# Patient Record
Sex: FEMALE | Race: WHITE | NOT HISPANIC OR LATINO | Employment: OTHER | URBAN - METROPOLITAN AREA
[De-identification: names, ages, dates, MRNs, and addresses within clinical notes are randomized per-mention and may not be internally consistent; named-entity substitution may affect disease eponyms.]

---

## 2018-11-09 ENCOUNTER — APPOINTMENT (EMERGENCY)
Dept: RADIOLOGY | Facility: HOSPITAL | Age: 83
End: 2018-11-09
Payer: MEDICARE

## 2018-11-09 ENCOUNTER — HOSPITAL ENCOUNTER (OUTPATIENT)
Facility: HOSPITAL | Age: 83
Setting detail: OBSERVATION
Discharge: HOME | End: 2018-11-10
Attending: EMERGENCY MEDICINE | Admitting: INTERNAL MEDICINE
Payer: MEDICARE

## 2018-11-09 DIAGNOSIS — R07.9 CHEST PAIN: Primary | ICD-10-CM

## 2018-11-09 DIAGNOSIS — R07.1 CHEST PAIN ON BREATHING: ICD-10-CM

## 2018-11-09 PROBLEM — G30.9 ALZHEIMER'S DEMENTIA WITHOUT BEHAVIORAL DISTURBANCE (HCC): Status: ACTIVE | Noted: 2018-11-09

## 2018-11-09 PROBLEM — E11.9 TYPE 2 DIABETES MELLITUS WITHOUT COMPLICATION, WITHOUT LONG-TERM CURRENT USE OF INSULIN (HCC): Status: ACTIVE | Noted: 2018-11-09

## 2018-11-09 PROBLEM — E78.2 MIXED HYPERLIPIDEMIA: Status: ACTIVE | Noted: 2018-11-09

## 2018-11-09 PROBLEM — F02.80 ALZHEIMER'S DEMENTIA WITHOUT BEHAVIORAL DISTURBANCE (HCC): Status: ACTIVE | Noted: 2018-11-09

## 2018-11-09 LAB
ALBUMIN SERPL BCP-MCNC: 3.1 G/DL (ref 3.5–5)
ALP SERPL-CCNC: 94 U/L (ref 46–116)
ALT SERPL W P-5'-P-CCNC: 34 U/L (ref 12–78)
ANION GAP SERPL CALCULATED.3IONS-SCNC: 7 MMOL/L (ref 4–13)
AST SERPL W P-5'-P-CCNC: 27 U/L (ref 5–45)
BASOPHILS # BLD AUTO: 0.02 THOUSANDS/ΜL (ref 0–0.1)
BASOPHILS NFR BLD AUTO: 0 % (ref 0–1)
BILIRUB SERPL-MCNC: 0.27 MG/DL (ref 0.2–1)
BUN SERPL-MCNC: 11 MG/DL (ref 5–25)
CALCIUM SERPL-MCNC: 8.3 MG/DL (ref 8.3–10.1)
CHLORIDE SERPL-SCNC: 103 MMOL/L (ref 100–108)
CO2 SERPL-SCNC: 27 MMOL/L (ref 21–32)
CREAT SERPL-MCNC: 0.78 MG/DL (ref 0.6–1.3)
DEPRECATED D DIMER PPP: 1070 NG/ML (FEU) (ref 0–424)
EOSINOPHIL # BLD AUTO: 0.32 THOUSAND/ΜL (ref 0–0.61)
EOSINOPHIL NFR BLD AUTO: 6 % (ref 0–6)
ERYTHROCYTE [DISTWIDTH] IN BLOOD BY AUTOMATED COUNT: 13.1 % (ref 11.6–15.1)
GFR SERPL CREATININE-BSD FRML MDRD: 71 ML/MIN/1.73SQ M
GLUCOSE SERPL-MCNC: 180 MG/DL (ref 65–140)
HCT VFR BLD AUTO: 37.1 % (ref 34.8–46.1)
HGB BLD-MCNC: 12.1 G/DL (ref 11.5–15.4)
IMM GRANULOCYTES # BLD AUTO: 0.03 THOUSAND/UL (ref 0–0.2)
IMM GRANULOCYTES NFR BLD AUTO: 1 % (ref 0–2)
LYMPHOCYTES # BLD AUTO: 1.48 THOUSANDS/ΜL (ref 0.6–4.47)
LYMPHOCYTES NFR BLD AUTO: 27 % (ref 14–44)
MCH RBC QN AUTO: 32.5 PG (ref 26.8–34.3)
MCHC RBC AUTO-ENTMCNC: 32.6 G/DL (ref 31.4–37.4)
MCV RBC AUTO: 100 FL (ref 82–98)
MONOCYTES # BLD AUTO: 0.77 THOUSAND/ΜL (ref 0.17–1.22)
MONOCYTES NFR BLD AUTO: 14 % (ref 4–12)
NEUTROPHILS # BLD AUTO: 2.86 THOUSANDS/ΜL (ref 1.85–7.62)
NEUTS SEG NFR BLD AUTO: 52 % (ref 43–75)
NRBC BLD AUTO-RTO: 0 /100 WBCS
PLATELET # BLD AUTO: 219 THOUSANDS/UL (ref 149–390)
PLATELET # BLD AUTO: 223 THOUSANDS/UL (ref 149–390)
PMV BLD AUTO: 11.4 FL (ref 8.9–12.7)
PMV BLD AUTO: 11.4 FL (ref 8.9–12.7)
POTASSIUM SERPL-SCNC: 4 MMOL/L (ref 3.5–5.3)
PROT SERPL-MCNC: 6.5 G/DL (ref 6.4–8.2)
RBC # BLD AUTO: 3.72 MILLION/UL (ref 3.81–5.12)
SODIUM SERPL-SCNC: 137 MMOL/L (ref 136–145)
TROPONIN I SERPL-MCNC: <0.02 NG/ML
WBC # BLD AUTO: 5.48 THOUSAND/UL (ref 4.31–10.16)

## 2018-11-09 PROCEDURE — 71046 X-RAY EXAM CHEST 2 VIEWS: CPT

## 2018-11-09 PROCEDURE — 85379 FIBRIN DEGRADATION QUANT: CPT | Performed by: EMERGENCY MEDICINE

## 2018-11-09 PROCEDURE — 96374 THER/PROPH/DIAG INJ IV PUSH: CPT

## 2018-11-09 PROCEDURE — 99285 EMERGENCY DEPT VISIT HI MDM: CPT

## 2018-11-09 PROCEDURE — 84484 ASSAY OF TROPONIN QUANT: CPT | Performed by: EMERGENCY MEDICINE

## 2018-11-09 PROCEDURE — 93005 ELECTROCARDIOGRAM TRACING: CPT

## 2018-11-09 PROCEDURE — 85025 COMPLETE CBC W/AUTO DIFF WBC: CPT | Performed by: EMERGENCY MEDICINE

## 2018-11-09 PROCEDURE — 71275 CT ANGIOGRAPHY CHEST: CPT

## 2018-11-09 PROCEDURE — 36415 COLL VENOUS BLD VENIPUNCTURE: CPT | Performed by: EMERGENCY MEDICINE

## 2018-11-09 PROCEDURE — 99220 PR INITIAL OBSERVATION CARE/DAY 70 MINUTES: CPT | Performed by: INTERNAL MEDICINE

## 2018-11-09 PROCEDURE — 1124F ACP DISCUSS-NO DSCNMKR DOCD: CPT | Performed by: INTERNAL MEDICINE

## 2018-11-09 PROCEDURE — 85049 AUTOMATED PLATELET COUNT: CPT | Performed by: INTERNAL MEDICINE

## 2018-11-09 PROCEDURE — 80053 COMPREHEN METABOLIC PANEL: CPT | Performed by: EMERGENCY MEDICINE

## 2018-11-09 RX ORDER — ONDANSETRON 2 MG/ML
4 INJECTION INTRAMUSCULAR; INTRAVENOUS EVERY 6 HOURS PRN
Status: DISCONTINUED | OUTPATIENT
Start: 2018-11-09 | End: 2018-11-10 | Stop reason: HOSPADM

## 2018-11-09 RX ORDER — 0.9 % SODIUM CHLORIDE 0.9 %
3 VIAL (ML) INJECTION AS NEEDED
Status: DISCONTINUED | OUTPATIENT
Start: 2018-11-09 | End: 2018-11-10 | Stop reason: HOSPADM

## 2018-11-09 RX ORDER — KETOROLAC TROMETHAMINE 30 MG/ML
15 INJECTION, SOLUTION INTRAMUSCULAR; INTRAVENOUS ONCE
Status: COMPLETED | OUTPATIENT
Start: 2018-11-09 | End: 2018-11-09

## 2018-11-09 RX ADMIN — IOHEXOL 85 ML: 350 INJECTION, SOLUTION INTRAVENOUS at 16:46

## 2018-11-09 RX ADMIN — KETOROLAC TROMETHAMINE: 30 INJECTION, SOLUTION INTRAMUSCULAR at 15:56

## 2018-11-09 NOTE — ED PROVIDER NOTES
History  Chief Complaint   Patient presents with    Chest Pain     Pt with dementia, here for chest pain, was given 324mg po ASA and 2 sl nitro en route  Patient is an 17-year-old female with a history of hyperlipidemia and diabetes that presents with chest pain  Patient is a poor historian as she has a history of dementia  Patient says that she has been dealing with intermittent chest pain on the left chest over the past two days through she says that the pain worsened acutely at 11:00 a m  this morning  Patient tells me that the pain has been waxing and waning though it never completely goes away since yesterday  She describes the pain as nonradiating, nonexertional   She does tell me that the pain is pleuritic in nature  The pain is also worsened with movement of the left shoulder  She denies any associated nausea, vomiting, diaphoresis, dyspnea  She says that at its worst the pain is a 5/10 but it is basal level is 3/10  The she denies viral upper respiratory symptoms including cough, congestion, rhinorrhea, fevers, chills  She has had no known sick contacts  The pain does not radiate into her back and it is not tearing coffee  Specifically, patient describes the pain as a poking  She has not taken anything for the pain  ACS risk factors include hyperlipidemia and diabetes though she has no known cardiac history  She denies specific DVT/PE risk factors including recent surgery, recent immobilization, hormone use, prior history DVT  None       Past Medical History:   Diagnosis Date    Alzheimer's dementia     HLD (hyperlipidemia)     T2DM (type 2 diabetes mellitus) ([facility] [address] )        History reviewed  No pertinent surgical history  Family History   Problem Relation Age of Onset    No Known Problems Mother     No Known Problems Father      I have reviewed and agree with the history as documented      Social History   Substance Use Topics    Smoking status: Never Smoker    Smokeless tobacco: Not on file    Alcohol use No        Review of Systems   Constitutional: Negative for chills, diaphoresis, fatigue and fever  HENT: Negative for facial swelling, sore throat and trouble swallowing  Respiratory: Negative for cough, chest tightness, shortness of breath and wheezing  Cardiovascular: Positive for chest pain  Negative for palpitations and leg swelling  Gastrointestinal: Negative for abdominal distention, abdominal pain, diarrhea, nausea and vomiting  Genitourinary: Negative for dysuria  Musculoskeletal: Negative for back pain, neck pain and neck stiffness  Skin: Negative for color change, pallor, rash and wound  Neurological: Negative for weakness, light-headedness and numbness  Psychiatric/Behavioral: Negative for agitation  All other systems reviewed and are negative  Physical Exam  ED Triage Vitals [11/09/18 1508]   Temperature Pulse Respirations Blood Pressure SpO2   (!) 97 3 °F (36 3 °C) 79 16 140/91 98 %      Temp Source Heart Rate Source Patient Position - Orthostatic VS BP Location FiO2 (%)   Tympanic Monitor Sitting Right arm --      Pain Score       No Pain           Orthostatic Vital Signs  Vitals:    11/09/18 2018 11/09/18 2026 11/09/18 2300 11/10/18 0730   BP: (!) 173/75 130/70 135/72 165/82   Pulse: 69  77 83   Patient Position - Orthostatic VS: Lying Lying Lying Lying       Physical Exam   Constitutional: She is oriented to person, place, and time  She appears well-developed and well-nourished  No distress  HENT:   Head: Normocephalic  Eyes: Pupils are equal, round, and reactive to light  Neck: Normal range of motion  Neck supple  Cardiovascular: Normal rate, regular rhythm, normal heart sounds and intact distal pulses  Pulmonary/Chest: Effort normal and breath sounds normal    Lungs are clear bilaterally   Abdominal: Soft  Bowel sounds are normal  She exhibits no distension  There is no tenderness  There is no guarding     Abdomen is soft, nondistended, nontender  No rebound tenderness or guarding  No masses palpated  Musculoskeletal: Normal range of motion  She exhibits tenderness  She exhibits no edema or deformity  Chest pain is reproducible with palpation of the left chest   Chest pain is also reproducible with passive motion of the left shoulder  Neurological: She is alert and oriented to person, place, and time  No cranial nerve deficit or sensory deficit  Skin: Skin is warm and dry  Capillary refill takes less than 2 seconds  Psychiatric: She has a normal mood and affect  Her behavior is normal  Judgment and thought content normal    Vitals reviewed        ED Medications  Medications   ketorolac (TORADOL) injection 15 mg ( Intravenous Given 11/9/18 1556)   iohexol (OMNIPAQUE) 350 MG/ML injection (MULTI-DOSE) 85 mL (85 mL Intravenous Given 11/9/18 1646)       Diagnostic Studies  Results Reviewed     Procedure Component Value Units Date/Time    CBC and differential [[id number]]  (Abnormal) Collected:  11/10/18    Lab Status:  Final result Specimen:  Blood Updated:  11/10/18 0840     WBC 5 57 Thousand/uL      RBC 3 48 (L) Million/uL      Hemoglobin 11 4 (L) g/dL      Hematocrit 34 4 (L) %      MCV 99 (H) fL      MCH 32 8 pg      MCHC 33 1 g/dL      RDW 13 2 %      MPV 12 0 fL      Platelets 793 Thousands/uL      nRBC 0 /100 WBCs      Neutrophils Relative 48 %      Immat GRANS % 1 %      Lymphocytes Relative 27 %      Monocytes Relative 16 (H) %      Eosinophils Relative 8 (H) %      Basophils Relative 0 %      Neutrophils Absolute 2 66 Thousands/µL      Immature Grans Absolute 0 03 Thousand/uL      Lymphocytes Absolute 1 52 Thousands/µL      Monocytes Absolute 0 90 Thousand/µL      Eosinophils Absolute 0 44 Thousand/µL      Basophils Absolute 0 02 Thousands/µL     Hemoglobin A1C [[id number]] Collected:  11/10/18    Lab Status:  Final result Specimen:  Blood Updated:  11/10/18 0641     Hemoglobin A1C 6 2 %       mg/dl Troponin I [[id number]]  (Normal) Collected:  11/10/18 0432    Lab Status:  Final result Specimen:  Blood from Arm, Right Updated:  11/10/18 0639     Troponin I <0 02 ng/mL     D-dimer, quantitative [[id number]]  (Abnormal) Collected:  11/09/18 1548    Lab Status:  Final result Specimen:  Blood from Arm, Left Updated:  11/09/18 1623     D-Dimer, Quant 1,070 (H) ng/ml (FEU)     Comprehensive metabolic panel [[id number]]  (Abnormal) Collected:  11/09/18 1548    Lab Status:  Final result Specimen:  Blood from Arm, Left Updated:  11/09/18 1623     Sodium 137 mmol/L      Potassium 4 0 mmol/L      Chloride 103 mmol/L      CO2 27 mmol/L      ANION GAP 7 mmol/L      BUN 11 mg/dL      Creatinine 0 78 mg/dL      Glucose 180 (H) mg/dL      Calcium 8 3 mg/dL      AST 27 U/L      ALT 34 U/L      Alkaline Phosphatase 94 U/L      Total Protein 6 5 g/dL      Albumin 3 1 (L) g/dL      Total Bilirubin 0 27 mg/dL      eGFR 71 ml/min/1 73sq m     Narrative:         National Kidney Disease Education Program recommendations are as follows:  GFR calculation is accurate only with a steady state creatinine  Chronic Kidney disease less than 60 ml/min/1 73 sq  meters  Kidney failure less than 15 ml/min/1 73 sq  meters      Troponin I [[id number]]  (Normal) Collected:  11/09/18 1548    Lab Status:  Final result Specimen:  Blood from Arm, Left Updated:  11/09/18 1617     Troponin I <0 02 ng/mL     CBC and differential [[id number]]  (Abnormal) Collected:  11/09/18 1548    Lab Status:  Final result Specimen:  Blood from Arm, Left Updated:  11/09/18 1603     WBC 5 48 Thousand/uL      RBC 3 72 (L) Million/uL      Hemoglobin 12 1 g/dL      Hematocrit 37 1 %       (H) fL      MCH 32 5 pg      MCHC 32 6 g/dL      RDW 13 1 %      MPV 11 4 fL      Platelets 765 Thousands/uL      nRBC 0 /100 WBCs      Neutrophils Relative 52 %      Immat GRANS % 1 %      Lymphocytes Relative 27 %      Monocytes Relative 14 (H) %      Eosinophils Relative 6 % Basophils Relative 0 %      Neutrophils Absolute 2 86 Thousands/µL      Immature Grans Absolute 0 03 Thousand/uL      Lymphocytes Absolute 1 48 Thousands/µL      Monocytes Absolute 0 77 Thousand/µL      Eosinophils Absolute 0 32 Thousand/µL      Basophils Absolute 0 02 Thousands/µL                  X-ray chest 2 views   Final Result by Fernanda Davis DO (11/09 1712)      No acute cardiopulmonary disease  Workstation performed: [id number]         CTA ED chest PE study   Final Result by Hay Lopes MD (11/09 1702)      No pulmonary arterial embolism  No pulmonary consolidation  Mosaic density of the pulmonary parenchyma in keeping with a chronic small vessel or small airway disease  Cardiomegaly        Workstation performed: [id number]               Procedures  ECG 12 Lead Documentation  Date/Time: 11/12/2018 9:16 AM  Performed by: Tessa Carrillo by: Gena Garcia     ECG reviewed by me, the ED Provider: yes    Patient location:  ED  Previous ECG:     Previous ECG:  Compared to current    Similarity:  No change    Comparison to cardiac monitor: Yes    Interpretation:     Interpretation: normal    Rate:     ECG rate assessment: normal    Rhythm:     Rhythm: sinus rhythm    Ectopy:     Ectopy: none    Conduction:     Conduction: normal    ST segments:     ST segments:  Normal  T waves:     T waves: normal            Phone Consults  ED Phone Contact    ED Course         HEART Risk Score      Most Recent Value   History  1 Filed at: 11/09/2018 1618   ECG  0 Filed at: 11/09/2018 1618   Age  2 Filed at: 11/09/2018 1618   Risk Factors  1 Filed at: 11/09/2018 1618   Troponin  0 Filed at: 11/09/2018 1618   Heart Score Risk Calculator   History  1 Filed at: 11/09/2018 1618   ECG  0 Filed at: 11/09/2018 1618   Age  2 Filed at: 11/09/2018 1618   Risk Factors  1 Filed at: 11/09/2018 1618   Troponin  0 Filed at: 11/09/2018 1618   HEART Score  4 Filed at: 11/09/2018 1618   HEART Score  4 Filed at: 11/09/2018 1618                            [facility]  Number of Diagnoses or Management Options  Chest pain: new and requires workup  Diagnosis management comments: Patient is an 68-year-old female with history of hyperlipidemia and diabetes who presents with chest pain  Due to the pleuritic nature of the pain, get a screening D-dimer on the patient which was positive  We then continued to get a CTA PE study which was negative  Otherwise the initial workup was negative including EKG, troponin  Patient will be admitted to [facility] for chest pain rule  Heart score 4  Patient was hemodynamically stable throughout her time here in the emergency department  Amount and/or Complexity of Data Reviewed  Clinical lab tests: ordered and reviewed  Tests in the radiology section of CPT®: ordered and reviewed    Risk of Complications, Morbidity, and/or Mortality  Presenting problems: low  Diagnostic procedures: low  Management options: low    Patient Progress  Patient progress: stable    CritCare Time    Disposition  Final diagnoses:   Chest pain     Time reflects when diagnosis was documented in both MDM as applicable and the Disposition within this note     Time User Action Codes Description Comment    11/9/2018  5:20 PM Harjinder Ripper [R07 9] Chest pain     11/10/2018 10:49 AM Amaris Mast [R07 1] Chest pain on breathing       ED Disposition     ED Disposition Condition Comment    Admit  Case was discussed with [facility] and the patient's admission status was agreed to be Admission Status: observation status to the service of Dr Irene Stoddard           Follow-up Information     Follow up With Specialties Details Why Contact Info    Debi Del Valle MD [facility] Medicine Schedule an appointment as soon as possible for a visit in 1 week(s)  [address] [place] [address] [id number] [phone]            Discharge Medication List as of 11/10/2018 11:00 AM      START taking these medications Details   ibuprofen (MOTRIN) 400 mg tablet Take 1 tablet (400 mg total) by mouth every 8 (eight) hours as needed for mild pain, Starting Sat 11/10/2018, No Print             Outpatient Discharge Orders  Discharge Diet     Discharge Diet     Activity as tolerated     Call provider for:  severe uncontrolled pain         ED Provider  Attending physically available and evaluated Lora Dumont I managed the patient along with the ED Attending      Electronically Signed by         Shu Cervantes MD  11/12/18 [id number]

## 2018-11-09 NOTE — ED ATTENDING ATTESTATION
Kalie Kramer MD, saw and evaluated the patient  I have discussed the patient with the resident/non-physician practitioner and agree with the resident's/non-physician practitioner's findings, Plan of Care, and MDM as documented in the resident's/non-physician practitioner's note, except where noted  All available labs and Radiology studies were reviewed  At this point I agree with the current assessment done in the Emergency Department  I have conducted an independent evaluation of this patient a history and physical is as follows:  29-year-old female presenting with chest pain  Patient has dimension is a poor historian  Patient complained of burning chest pain and indigestion yesterday at her facility  Today, she complains of a poking chest pain in her left chest that is made worse with breathing  The patient states that it is intermittent, and ranges from a 3/10 to S 5/10  She has not had fevers or cough  She does not have PE risk factors  The pain was not maximal at onset  It does not radiate to her back is not tearing in quality  The pain is also made worse by movement  Nothing in particular makes it better, and is not affected by food  The patient has a history of diabetes and hyperlipidemia  She has no coronary history  Her review of systems otherwise negative in 12 systems were reviewed  On exam Vital signs were reviewed  Patient is awake, alert, interactive  The patient's pupils are equally round reactive to light  Oropharynx is clear with moist mucous membranes  Neck is supple and nontender with no adenopathy or JVD  Heart is regular with no murmurs, rubs, or gallops  Lungs are clear and equal with no wheezes, rales, or rhonchi  Abdomen is soft and nontender with no masses, rebound, or guarding  There is no CVA tenderness  The patient was completely exposed  There is no skin breakdown  There are no rashes or skin changes    Extremities are warm and well perfused with good pulses  The patient has normal strength, sensation, and cranial nerves  EKG reviewed by me, no evidence of ischemia or ectopy  Impression:  Chest pain  Patient is low risk for PE, therefore will plan to check D-dimer  Patient has heart score of 5, so will admit for observation for chest pain evaluation      Critical Care Time  CritCare Time    Procedures

## 2018-11-10 VITALS
HEIGHT: 61 IN | SYSTOLIC BLOOD PRESSURE: 165 MMHG | WEIGHT: 138 LBS | DIASTOLIC BLOOD PRESSURE: 82 MMHG | OXYGEN SATURATION: 97 % | BODY MASS INDEX: 26.06 KG/M2 | TEMPERATURE: 98 F | RESPIRATION RATE: 16 BRPM | HEART RATE: 83 BPM

## 2018-11-10 LAB
ALBUMIN SERPL BCP-MCNC: 2.7 G/DL (ref 3.5–5)
ALP SERPL-CCNC: 75 U/L (ref 46–116)
ALT SERPL W P-5'-P-CCNC: 31 U/L (ref 12–78)
ANION GAP SERPL CALCULATED.3IONS-SCNC: 3 MMOL/L (ref 4–13)
AST SERPL W P-5'-P-CCNC: 23 U/L (ref 5–45)
ATRIAL RATE: 76 BPM
BASOPHILS # BLD AUTO: 0.02 THOUSANDS/ΜL (ref 0–0.1)
BASOPHILS NFR BLD AUTO: 0 % (ref 0–1)
BILIRUB SERPL-MCNC: 0.24 MG/DL (ref 0.2–1)
BUN SERPL-MCNC: 15 MG/DL (ref 5–25)
CALCIUM SERPL-MCNC: 8.3 MG/DL (ref 8.3–10.1)
CHLORIDE SERPL-SCNC: 105 MMOL/L (ref 100–108)
CHOLEST SERPL-MCNC: 146 MG/DL (ref 50–200)
CO2 SERPL-SCNC: 29 MMOL/L (ref 21–32)
CREAT SERPL-MCNC: 0.71 MG/DL (ref 0.6–1.3)
EOSINOPHIL # BLD AUTO: 0.44 THOUSAND/ΜL (ref 0–0.61)
EOSINOPHIL NFR BLD AUTO: 8 % (ref 0–6)
ERYTHROCYTE [DISTWIDTH] IN BLOOD BY AUTOMATED COUNT: 13.2 % (ref 11.6–15.1)
ERYTHROCYTE [DISTWIDTH] IN BLOOD BY AUTOMATED COUNT: 13.2 % (ref 11.6–15.1)
EST. AVERAGE GLUCOSE BLD GHB EST-MCNC: 131 MG/DL
GFR SERPL CREATININE-BSD FRML MDRD: 79 ML/MIN/1.73SQ M
GLUCOSE SERPL-MCNC: 100 MG/DL (ref 65–140)
GLUCOSE SERPL-MCNC: 128 MG/DL (ref 65–140)
HBA1C MFR BLD: 6.2 % (ref 4.2–6.3)
HCT VFR BLD AUTO: 34.4 % (ref 34.8–46.1)
HCT VFR BLD AUTO: 35.2 % (ref 34.8–46.1)
HDLC SERPL-MCNC: 52 MG/DL (ref 40–60)
HGB BLD-MCNC: 11.3 G/DL (ref 11.5–15.4)
HGB BLD-MCNC: 11.4 G/DL (ref 11.5–15.4)
IMM GRANULOCYTES # BLD AUTO: 0.03 THOUSAND/UL (ref 0–0.2)
IMM GRANULOCYTES NFR BLD AUTO: 1 % (ref 0–2)
LDLC SERPL CALC-MCNC: 72 MG/DL (ref 0–100)
LYMPHOCYTES # BLD AUTO: 1.52 THOUSANDS/ΜL (ref 0.6–4.47)
LYMPHOCYTES NFR BLD AUTO: 27 % (ref 14–44)
MCH RBC QN AUTO: 32.1 PG (ref 26.8–34.3)
MCH RBC QN AUTO: 32.8 PG (ref 26.8–34.3)
MCHC RBC AUTO-ENTMCNC: 32.1 G/DL (ref 31.4–37.4)
MCHC RBC AUTO-ENTMCNC: 33.1 G/DL (ref 31.4–37.4)
MCV RBC AUTO: 100 FL (ref 82–98)
MCV RBC AUTO: 99 FL (ref 82–98)
MONOCYTES # BLD AUTO: 0.9 THOUSAND/ΜL (ref 0.17–1.22)
MONOCYTES NFR BLD AUTO: 16 % (ref 4–12)
NEUTROPHILS # BLD AUTO: 2.66 THOUSANDS/ΜL (ref 1.85–7.62)
NEUTS SEG NFR BLD AUTO: 48 % (ref 43–75)
NONHDLC SERPL-MCNC: 94 MG/DL
NRBC BLD AUTO-RTO: 0 /100 WBCS
P AXIS: 32 DEGREES
PLATELET # BLD AUTO: 202 THOUSANDS/UL (ref 149–390)
PLATELET # BLD AUTO: 202 THOUSANDS/UL (ref 149–390)
PMV BLD AUTO: 11.6 FL (ref 8.9–12.7)
PMV BLD AUTO: 12 FL (ref 8.9–12.7)
POTASSIUM SERPL-SCNC: 4 MMOL/L (ref 3.5–5.3)
PR INTERVAL: 156 MS
PROT SERPL-MCNC: 5.9 G/DL (ref 6.4–8.2)
QRS AXIS: -38 DEGREES
QRSD INTERVAL: 102 MS
QT INTERVAL: 438 MS
QTC INTERVAL: 492 MS
RBC # BLD AUTO: 3.48 MILLION/UL (ref 3.81–5.12)
RBC # BLD AUTO: 3.52 MILLION/UL (ref 3.81–5.12)
SODIUM SERPL-SCNC: 137 MMOL/L (ref 136–145)
T WAVE AXIS: 62 DEGREES
T4 FREE SERPL-MCNC: 0.99 NG/DL (ref 0.76–1.46)
TRIGL SERPL-MCNC: 109 MG/DL
TROPONIN I SERPL-MCNC: <0.02 NG/ML
TROPONIN I SERPL-MCNC: <0.02 NG/ML
TSH SERPL DL<=0.05 MIU/L-ACNC: 4.35 UIU/ML (ref 0.36–3.74)
VENTRICULAR RATE: 76 BPM
WBC # BLD AUTO: 5.16 THOUSAND/UL (ref 4.31–10.16)
WBC # BLD AUTO: 5.57 THOUSAND/UL (ref 4.31–10.16)

## 2018-11-10 PROCEDURE — 84484 ASSAY OF TROPONIN QUANT: CPT | Performed by: NURSE PRACTITIONER

## 2018-11-10 PROCEDURE — 80053 COMPREHEN METABOLIC PANEL: CPT | Performed by: INTERNAL MEDICINE

## 2018-11-10 PROCEDURE — 93010 ELECTROCARDIOGRAM REPORT: CPT | Performed by: INTERNAL MEDICINE

## 2018-11-10 PROCEDURE — 84439 ASSAY OF FREE THYROXINE: CPT | Performed by: NURSE PRACTITIONER

## 2018-11-10 PROCEDURE — 99217 PR OBSERVATION CARE DISCHARGE MANAGEMENT: CPT | Performed by: NURSE PRACTITIONER

## 2018-11-10 PROCEDURE — 82948 REAGENT STRIP/BLOOD GLUCOSE: CPT

## 2018-11-10 PROCEDURE — 84484 ASSAY OF TROPONIN QUANT: CPT | Performed by: INTERNAL MEDICINE

## 2018-11-10 PROCEDURE — 84443 ASSAY THYROID STIM HORMONE: CPT | Performed by: INTERNAL MEDICINE

## 2018-11-10 PROCEDURE — 80061 LIPID PANEL: CPT | Performed by: INTERNAL MEDICINE

## 2018-11-10 PROCEDURE — 83036 HEMOGLOBIN GLYCOSYLATED A1C: CPT | Performed by: INTERNAL MEDICINE

## 2018-11-10 PROCEDURE — 85025 COMPLETE CBC W/AUTO DIFF WBC: CPT | Performed by: INTERNAL MEDICINE

## 2018-11-10 RX ORDER — IBUPROFEN 400 MG/1
400 TABLET ORAL EVERY 8 HOURS PRN
Refills: 0
Start: 2018-11-10 | End: 2020-04-16 | Stop reason: HOSPADM

## 2018-11-10 RX ADMIN — ENOXAPARIN SODIUM 40 MG: 40 INJECTION SUBCUTANEOUS at 08:47

## 2018-11-10 NOTE — RESPIRATORY THERAPY NOTE
RT Protocol Note  Nimesh Gautam 80 y o  female MRN: [id number]  Unit/Bed#: CW2 217-01 Encounter: [id number]    Assessment    Active Problems:    Chest pain on breathing    Type 2 diabetes mellitus without complication, without long-term current use of insulin (HCC)    Mixed hyperlipidemia    Alzheimer's dementia without behavioral disturbance      Home Pulmonary Medications:  None       Past Medical History:   Diagnosis Date    Alzheimer's dementia     HLD (hyperlipidemia)     T2DM (type 2 diabetes mellitus) ([facility] [address] )      Social History     Social History    Marital status:      Spouse name: N/A    Number of children: N/A    Years of education: N/A     Social History Main Topics    Smoking status: Never Smoker    Smokeless tobacco: None    Alcohol use No    Drug use: Unknown    Sexual activity: Not Asked     Other Topics Concern    None     Social History Narrative    None       Subjective         Objective    Physical Exam:   Assessment Type: (P) Assess only  General Appearance: (P) Alert, Awake  Respiratory Pattern: (P) Normal  Chest Assessment: (P) Chest expansion symmetrical  Bilateral Breath Sounds: (P) Clear  Cough: (P) None  O2 Device: (P) RA    Vitals:  Blood pressure 130/70, pulse 69, temperature 98 3 °F (36 8 °C), temperature source Oral, resp  rate 16, height 5' 1" (1 549 m), weight 62 6 kg (138 lb), SpO2 97 %  Imaging and other studies: I have personally reviewed pertinent reports  O2 Device: (P) RA     Plan    Respiratory Plan: (P) No distress/Pulmonary history, Discontinue Protocol        Resp Comments: (P) Patient was assessed for respiratory protocol  Patient is admitted with CP (c/o pain on left)  Patient denies having any pulmonary history and takes no respiratory medications at home  Patient appears comfortable in no distress with clear breath sounds upon exam   CXR was read as "clear"  Bronchodilators are not needed at this time    Respiratory protocol will be D/C'd at this time

## 2018-11-10 NOTE — ASSESSMENT & PLAN NOTE
Probably musculoskeletal chest pain  R/o ACS given age and risk factors  Serial EKG's and troponins - if negative, stress test as outpt via PCP

## 2018-11-10 NOTE — UTILIZATION REVIEW
Initial Clinical Review    Admission: Date/Time/Statement: 11/09/18 @ 1722 -- OBS    Orders Placed This Encounter   Procedures    Place in Observation (expected length of stay for this patient is less than two midnights)     Standing Status:   Standing     Number of Occurrences:   1     Order Specific Question:   Admitting Physician     Answer:   Donneta Hatchet     Order Specific Question:   Level of Care     Answer:   Med Surg [16]       ED: Date/Time/Mode of Arrival:   ED Arrival Information     Expected Arrival Acuity Means of Arrival Escorted By Service Admission Type    - 11/9/2018 15:06 [facility] DEPARTMENT [facility] Hospitalist Emergency    Arrival Complaint    chest pain          Chief Complaint:   Chief Complaint   Patient presents with    Chest Pain     Pt with dementia, here for chest pain, was given 324mg po ASA and 2 sl nitro en route  History of Illness:   80 y o  female who presents with known Hx of dementia, T2DM and HLD, presented with atypical chest pain manifest as burning sensation lower chest yesterday and some pain on deep breathing on left side today  Was sent to ER for evaluation from [address] per son who provided Hx  ED Vital Signs:   ED Triage Vitals [11/09/18 1508]   Temperature Pulse Respirations Blood Pressure SpO2   (!) 97 3 °F (36 3 °C) 79 16 140/91 98 %      Temp Source Heart Rate Source Patient Position - Orthostatic VS BP Location FiO2 (%)   Tympanic Monitor Sitting Right arm --      Pain Score       No Pain        Wt Readings from Last 1 Encounters:   11/09/18 62 6 kg (138 lb)       Vital Signs (abnormal): /75    Abnormal Labs/Diagnostic Test Results: Glucose 180, D-dimer 1070    CT chest -- No pulmonary arterial embolism  No pulmonary consolidation  Mosaic density of the pulmonary parenchyma in keeping with a chronic small vessel or small airway disease   Cardiomegaly    ED Treatment:   Medication Administration from 11/09/2018 1506 to 11/09/2018 2004       Date/Time Order Dose Route Action     11/09/2018 1556 ketorolac (TORADOL) injection 15 mg   Intravenous Given     11/09/2018 1646 iohexol (OMNIPAQUE) 350 MG/ML injection (MULTI-DOSE) 85 mL 85 mL Intravenous Given       Past Medical/Surgical History:   Past Medical History:   Diagnosis Date    Alzheimer's dementia     HLD (hyperlipidemia)     T2DM (type 2 diabetes mellitus) ([facility] [address] )        Admitting Diagnosis: Chest pain [R07 9]    Age/Sex: 80 y o  female    Assessment/Plan: 79 y/o presented to ED from [address] with c/o chest pain on breathing -- serial trops & ekg's    Admission Orders:  Scheduled Meds:   enoxaparin 40 mg Subcutaneous Daily   ondansetron 4 mg Intravenous Q6H PRN   sodium chloride (PF) 3 mL Intravenous PRN     Telem  Serial trops with EKGS  Reg diet  I&O's  SCD's

## 2018-11-10 NOTE — PLAN OF CARE
Problem: Potential for Falls  Goal: Patient will remain free of falls  INTERVENTIONS:  - Assess patient frequently for physical needs  -  Identify cognitive and physical deficits and behaviors that affect risk of falls    -  [place] fall precautions as indicated by assessment   - Educate patient/family on patient safety including physical limitations  - Instruct patient to call for assistance with activity based on assessment  - Modify environment to reduce risk of injury  - Consider OT/PT consult to assist with strengthening/mobility   Outcome: Progressing

## 2018-11-10 NOTE — DISCHARGE SUMMARY
Discharge Summary - [address] [facility]    Patient Information: Theresa Bolton 80 y o  female MRN: [id number]  Unit/Bed#: CW2 217-01 Encounter: [id number]    Discharging Physician / Practitioner: AWA Osborne  PCP: Bev Camejo MD  Admission Date: 11/9/2018  Discharge Date: 11/10/18    Reason for Admission: chest pain    Discharge Diagnoses: Active Problems:    Chest pain on breathing    Type 2 diabetes mellitus without complication, without long-term current use of insulin (HCC)    Mixed hyperlipidemia    Alzheimer's dementia without behavioral disturbance  Resolved Problems:    * No resolved hospital problems  *      Consultations During Hospital Stay:  · None     Procedures Performed:     · None     Significant Findings / Test Results:     · Chest x-ray negative  · CTA PE study negative PE  No pulmonary consolidation  Mosaic density of the pulmonary parenchyma in keeping with a chronic small vessel or small airway disease  Cardiomegaly  · Serial troponins x3 negative  · CMP/CBC unremarkable  · Lipid panel total cholesterol 146, triglycerides 109, HDL 52, LDL 72  · D-dimer 1070  · Hemoglobin A1c 6 2  · TSH 4 350/T4 0 99  · EKG unavailable to me however documented as unremarkable in the ER  Incidental Findings:   · Elevated TSH however normal T4  Test Results Pending at Discharge (will require follow up): · None     Outpatient Tests Requested:  · Follow-up with primary care provider within 1 week for post hospital visit  Consideration for outpatient stress test and blood pressure check  Complications:  None    Hospital Course:     Theresa Bolton is a 80 y o  female patient with past medical history of hyperlipidemia and diabetes which seems to be diet controlled who originally presented to the hospital on 11/9/2018 due to an episode of left-sided chest pain/rib pain worsening with deep inspiration  No other associated symptoms   She was found to have an elevated D-dimer therefore CTA to rule out PE was pursued  This was negative  She had negative serial troponins x3  Her EKG is unavailable to me currently however was reported negative by admitting physician  Her telemetry review has been unremarkable  She does continue to have some mild left-sided rib pain which does worsen with inspiration and is reproducible with palpation  This seems most consistent with musculoskeletal pain  Low suspicion for cardiac etiology  Patient is medically stable for discharge  I have instructed her to follow up with her primary care provider  Incidentally, her blood pressures were noted to be elevated intermittently  She is on no medications at home as an outpatient  Do not feel there is any indication to start antihypertensive management therapy at this time  Will defer to PCP  Message left for reshma Haile  Callback number provided  Condition at Discharge: stable     Discharge Day Visit / Exam:     Subjective:  Patient offers no complaints other than persistent left rib pain which does slightly worsened with deep inspiration  She states that it is overall improved  No associated symptoms such as shortness of breath, palpitations  Vitals: Blood Pressure: 165/82 (11/10/18 0730)  Pulse: 83 (11/10/18 0730)  Temperature: 98 °F (36 7 °C) (11/10/18 0730)  Temp Source: Oral (11/10/18 0730)  Respirations: 16 (11/10/18 0730)  Height: 5' 1" (154 9 cm) (11/09/18 2018)  Weight - Scale: 62 6 kg (138 lb) (11/09/18 2018)  SpO2: 97 % (11/10/18 0730)     Exam:   Physical Exam   Constitutional: She is oriented to person, place, and time  No distress  HENT:   Head: Normocephalic  Eyes: Pupils are equal, round, and reactive to light  Neck: Normal range of motion  Cardiovascular: Normal rate, regular rhythm and intact distal pulses  No murmur heard  Pulmonary/Chest: Effort normal and breath sounds normal    Abdominal: Soft   Bowel sounds are normal    Musculoskeletal: Normal range of motion  She exhibits edema  Neurological: She is alert and oriented to person, place, and time  Skin: Skin is warm and dry  Psychiatric: She has a normal mood and affect  Nursing note and vitals reviewed  Discussion with Family: Patient    Discharge instructions/Information to patient and family:   See after visit summary for information provided to patient and family  Provisions for Follow-Up Care:  See after visit summary for information related to follow-up care and any pertinent home health orders  Disposition:     Home    For Discharges to   [address] [facility]:   · Not Applicable to this Patient - Not Applicable to this Patient    Planned Readmission: no     Discharge Statement:  I spent 45 minutes discharging the patient  This time was spent on the day of discharge  I had direct contact with the patient on the day of discharge  Greater than 50% of the total time was spent examining patient, answering all patient questions, arranging and discussing plan of care with patient as well as directly providing post-discharge instructions  Additional time then spent on discharge activities  Discharge Medications:  See after visit summary for reconciled discharge medications provided to patient and family        ** Please Note: This note has been constructed using a voice recognition system **

## 2018-11-10 NOTE — ASSESSMENT & PLAN NOTE
No results found for: HGBA1C    No results for input(s): POCGLU in the last 72 hours      Blood Sugar Average: Last 72 hrs:  note  on BMP  Check A1c  SSI  Doesn't report to be on home medications both by pt and Son

## 2018-11-10 NOTE — H&P
H&P- Renée Corrales 1935, 80 y o  female MRN: [id number]    Unit/Bed#: ED 06 Encounter: [id number]    Primary Care Provider: Ying Johnson MD   Date and time admitted to hospital: 11/9/2018  3:07 PM    Alzheimer's dementia without behavioral disturbance   Assessment & Plan    resides at [address] [facility] back there per Son     Mixed hyperlipidemia   Assessment & Plan    Reported per Hx but not on any home meds  Lipid profile in AM     Type 2 diabetes mellitus without complication, without long-term current use of insulin ([facility] [facility] )   Assessment & Plan    No results found for: HGBA1C    No results for input(s): POCGLU in the last 72 hours  Blood Sugar Average: Last 72 hrs:  note  on BMP  Check A1c  SSI  Doesn't report to be on home medications both by pt and Son       Chest pain on breathing   Assessment & Plan    Probably musculoskeletal chest pain  R/o ACS given age and risk factors  Serial EKG's and troponins - if negative, stress test as outpt via PCP       VTE Prophylaxis: Enoxaparin (Lovenox)  / sequential compression device   Code Status: No Order  POLST:     Anticipated Length of Stay:  Patient will be admitted on an Observation basis with an anticipated length of stay of  > 2 midnights  Justification for Hospital Stay:     Total Time for Visit, including Counseling / Coordination of Care: 1 hour  Greater than 50% of this total time spent on direct patient counseling and coordination of care  Chief Complaint:   Chest pain    of Present Illness:    Renée Corrales is a 80 y o  female who presents with known Hx of dementia, T2DM and HLD, presented with atypical chest pain manifest as burning sensation lower chest yesterday and some pain on deep breathing on left side today  Was sent to ER for evaluation from [address] per son who provided Hx  In ED, she had negative EKG, troponin and CT chest PE protocol  Being admitted for observation and r/o ACS    Hx from son, [facility] phone [phone]  Review of Systems:    Review of Systems   Constitutional: Positive for activity change and appetite change  HENT: Negative  Eyes: Negative  Respiratory: Negative  Cardiovascular: Negative  Gastrointestinal: Negative  Endocrine: Negative  Genitourinary: Negative  Musculoskeletal: Negative  Skin: Negative  Allergic/Immunologic: Negative  Neurological: Negative  Hematological: Negative  Psychiatric/Behavioral: Negative  Past Medical and Surgical History:     Past Medical History:   Diagnosis Date    Alzheimer's dementia     HLD (hyperlipidemia)     [facility]        History reviewed  No pertinent surgical history  Meds/Allergies:    PTA meds:   None       Allergies: No Known Allergies  History:     Marital Status:    Occupation:   Patient Pre-hospital Living Situation:   Patient Pre-hospital Level of Mobility:   Patient Pre-hospital Diet Restrictions:   Substance Use History:   History   Alcohol use Not on file     History   Smoking Status    Never Smoker   Smokeless Tobacco    Not on file     History   Drug use: Unknown       Family History:    Family History   Problem Relation Age of Onset    No Known Problems Mother     No Known Problems Father        Physical Exam:     Vitals:   Blood Pressure: 129/84 (11/09/18 1920)  Pulse: 69 (11/09/18 1920)  Temperature: (!) 97 3 °F (36 3 °C) (11/09/18 1508)  Temp Source: Tympanic (11/09/18 1508)  Respirations: 16 (11/09/18 1920)  Height: 5' 1 5" (156 2 cm) (11/09/18 1508)  Weight - Scale: 59 kg (130 lb) (11/09/18 1508)  SpO2: 96 % (11/09/18 1920)    Physical Exam   Constitutional: She is oriented to person, place, and time  HENT:   Head: Normocephalic  Eyes: Pupils are equal, round, and reactive to light  Cardiovascular: Normal heart sounds  Pulmonary/Chest: Effort normal    Abdominal: Soft     Neurological: She is alert and oriented to person, place, and time  Skin: There is pallor  Lab and Imaging Results: I have personally reviewed pertinent films in PACS      Results from last 7 days  Lab Units 11/09/18  1548   WBC Thousand/uL 5 48   HEMOGLOBIN g/dL 12 1   HEMATOCRIT % 37 1   PLATELETS Thousands/uL 223   NEUTROS PCT % 52   LYMPHS PCT % 27   MONOS PCT % 14*   EOS PCT % 6       Results from last 7 days  Lab Units 11/09/18  1548   POTASSIUM mmol/L 4 0   CHLORIDE mmol/L 103   CO2 mmol/L 27   BUN mg/dL 11   CREATININE mg/dL 0 78   CALCIUM mg/dL 8 3   ALK PHOS U/L 94   ALT U/L 34   AST U/L 27           X-ray Chest 2 Views    Result Date: 11/9/2018  Narrative: CHEST INDICATION:   Chest pain  COMPARISON:  None EXAM PERFORMED/VIEWS:  XR CHEST PA & LATERAL FINDINGS:  The patient is rotated to the right  The heart is normal size  Uncoiled thoracic aorta versus rotational changes  The lungs are clear  No pneumothorax or pleural effusion  Degenerative changes of the spine  Age-indeterminate compression fracture T10  Surgical clips right upper quadrant  Impression: No acute cardiopulmonary disease  Workstation performed: [id number]     [facility] Chest Pe Study    Result Date: 11/9/2018  Narrative: CTA - CHEST WITH IV CONTRAST - PULMONARY ANGIOGRAM INDICATION:   chest pain  "Patient is an 70-year-old female with a history of hyperlipidemia and diabetes that presents with chest pain  Patient says that she has been dealing with intermittent chest pain on the left chest into the through she says that the pain worsened acutely at 11:00 a m  This morning   " COMPARISON: Two-view chest from the prior day  TECHNIQUE: CTA examination of the chest was performed using angiographic technique according to a protocol specifically tailored to evaluate for pulmonary embolism  Axial, sagittal, and coronal 2D reformatted images were created from the source data and  submitted for interpretation    In addition, coronal 3D MIP postprocessing was performed on the acquisition scanner  Radiation dose length product (DLP) for this visit:  392 43 mGy-cm   This examination, like all CT scans performed in the [facility], was performed utilizing techniques to minimize radiation dose exposure, including the use of iterative  reconstruction and automated exposure control  IV Contrast:  85 mL of iohexol (OMNIPAQUE)  FINDINGS: PULMONARY ARTERIAL TREE:  No pulmonary embolus is seen  LUNGS:  No focal consolidation  No endotracheal or endobronchial lesion  Mild mosaic density of the pulmonary parenchyma in keeping with chronic small vessel or small airway disease  PLEURA:  Unremarkable  HEART/GREAT VESSELS:  Cardiomegaly  No thoracic aortic aneurysm  Coronary artery calcifications  MEDIASTINUM AND CARLYN:  Unremarkable  CHEST WALL AND LOWER NECK:   Unremarkable  VISUALIZED STRUCTURES IN THE UPPER ABDOMEN:  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  No complications  OSSEOUS STRUCTURES:  No acute fracture or destructive osseous lesion  Chronic T10 compression deformity     Impression: No pulmonary arterial embolism  No pulmonary consolidation  Mosaic density of the pulmonary parenchyma in keeping with a chronic small vessel or small airway disease  Cardiomegaly  Workstation performed: [id number]       EKG, Pathology, and Other Studies Reviewed on Admission:   · EKG - no acute ST/T changes    Allscripts Records Reviewed: Yes     ** Please Note: Dragon 360 Dictation voice to text software may have been used in the creation of this document   **

## 2018-11-10 NOTE — DISCHARGE INSTR - AVS FIRST PAGE
· Please take ibuprofen every 8 hours as needed  Follow-up with your primary care provider for post hospital visit/checkup  Your blood pressure was also high at times in the hospital, will not start any medications at this time  Your primary care doctor should monitor this

## 2018-11-10 NOTE — ASSESSMENT & PLAN NOTE
Pt arrives via EMS , pt staring up , not answering questions, grunting respirations, +diaphoretic , pt acting inappropriately resides at [address] [facility] back there per Son

## 2020-04-07 ENCOUNTER — APPOINTMENT (EMERGENCY)
Dept: RADIOLOGY | Facility: HOSPITAL | Age: 85
DRG: 871 | End: 2020-04-07
Payer: COMMERCIAL

## 2020-04-07 ENCOUNTER — HOSPITAL ENCOUNTER (INPATIENT)
Facility: HOSPITAL | Age: 85
LOS: 6 days | Discharge: HOME | DRG: 871 | End: 2020-04-13
Attending: EMERGENCY MEDICINE | Admitting: INTERNAL MEDICINE
Payer: COMMERCIAL

## 2020-04-07 DIAGNOSIS — K85.90 PANCREATITIS: ICD-10-CM

## 2020-04-07 DIAGNOSIS — R00.0 TACHYCARDIA: Primary | ICD-10-CM

## 2020-04-07 DIAGNOSIS — R91.8 GROUND GLASS OPACITY PRESENT ON IMAGING OF LUNG: ICD-10-CM

## 2020-04-07 LAB
ALBUMIN SERPL BCP-MCNC: 3.1 G/DL (ref 3.5–5)
ALP SERPL-CCNC: 105 U/L (ref 46–116)
ALT SERPL W P-5'-P-CCNC: 18 U/L (ref 12–78)
ANION GAP SERPL CALCULATED.3IONS-SCNC: 10 MMOL/L (ref 4–13)
AST SERPL W P-5'-P-CCNC: 34 U/L (ref 5–45)
BACTERIA UR QL AUTO: ABNORMAL /HPF
BASOPHILS # BLD AUTO: 0.05 THOUSANDS/ΜL (ref 0–0.1)
BASOPHILS NFR BLD AUTO: 0 % (ref 0–1)
BILIRUB SERPL-MCNC: 1 MG/DL (ref 0.2–1)
BILIRUB UR QL STRIP: ABNORMAL
BUN SERPL-MCNC: 16 MG/DL (ref 5–25)
CALCIUM SERPL-MCNC: 8.9 MG/DL (ref 8.3–10.1)
CHLORIDE SERPL-SCNC: 105 MMOL/L (ref 100–108)
CLARITY UR: CLEAR
CO2 SERPL-SCNC: 23 MMOL/L (ref 21–32)
COLOR UR: ABNORMAL
CREAT SERPL-MCNC: 0.83 MG/DL (ref 0.6–1.3)
CRP SERPL QL: >90 MG/L
D DIMER PPP FEU-MCNC: 6.94 UG/ML FEU
EOSINOPHIL # BLD AUTO: 0.01 THOUSAND/ΜL (ref 0–0.61)
EOSINOPHIL NFR BLD AUTO: 0 % (ref 0–6)
ERYTHROCYTE [DISTWIDTH] IN BLOOD BY AUTOMATED COUNT: 13.9 % (ref 11.6–15.1)
ERYTHROCYTE [SEDIMENTATION RATE] IN BLOOD: 35 MM/HOUR (ref 0–20)
FERRITIN SERPL-MCNC: 227 NG/ML (ref 8–388)
FIBRINOGEN PPP-MCNC: 534 MG/DL (ref 227–495)
GFR SERPL CREATININE-BSD FRML MDRD: 65 ML/MIN/1.73SQ M
GLUCOSE SERPL-MCNC: 223 MG/DL (ref 65–140)
GLUCOSE UR STRIP-MCNC: ABNORMAL MG/DL
HCT VFR BLD AUTO: 45.1 % (ref 34.8–46.1)
HGB BLD-MCNC: 15.4 G/DL (ref 11.5–15.4)
HGB UR QL STRIP.AUTO: ABNORMAL
HYALINE CASTS #/AREA URNS LPF: ABNORMAL /LPF
IMM GRANULOCYTES # BLD AUTO: 0.29 THOUSAND/UL (ref 0–0.2)
IMM GRANULOCYTES NFR BLD AUTO: 1 % (ref 0–2)
KETONES UR STRIP-MCNC: ABNORMAL MG/DL
LACTATE SERPL-SCNC: 2.8 MMOL/L (ref 0.5–2)
LDH SERPL-CCNC: 250 U/L (ref 81–234)
LEUKOCYTE ESTERASE UR QL STRIP: NEGATIVE
LIPASE SERPL-CCNC: 3904 U/L (ref 73–393)
LYMPHOCYTES # BLD AUTO: 1.02 THOUSANDS/ΜL (ref 0.6–4.47)
LYMPHOCYTES NFR BLD AUTO: 4 % (ref 14–44)
MCH RBC QN AUTO: 33.4 PG (ref 26.8–34.3)
MCHC RBC AUTO-ENTMCNC: 34.1 G/DL (ref 31.4–37.4)
MCV RBC AUTO: 98 FL (ref 82–98)
MONOCYTES # BLD AUTO: 1.95 THOUSAND/ΜL (ref 0.17–1.22)
MONOCYTES NFR BLD AUTO: 7 % (ref 4–12)
NEUTROPHILS # BLD AUTO: 24.74 THOUSANDS/ΜL (ref 1.85–7.62)
NEUTS SEG NFR BLD AUTO: 88 % (ref 43–75)
NITRITE UR QL STRIP: NEGATIVE
NON-SQ EPI CELLS URNS QL MICRO: ABNORMAL /HPF
NRBC BLD AUTO-RTO: 0 /100 WBCS
PH UR STRIP.AUTO: 6 [PH]
PLATELET # BLD AUTO: 273 THOUSANDS/UL (ref 149–390)
PMV BLD AUTO: 12 FL (ref 8.9–12.7)
POTASSIUM SERPL-SCNC: 3.9 MMOL/L (ref 3.5–5.3)
PROCALCITONIN SERPL-MCNC: 0.29 NG/ML
PROT SERPL-MCNC: 7 G/DL (ref 6.4–8.2)
PROT UR STRIP-MCNC: ABNORMAL MG/DL
RBC # BLD AUTO: 4.61 MILLION/UL (ref 3.81–5.12)
RBC #/AREA URNS AUTO: ABNORMAL /HPF
SODIUM SERPL-SCNC: 138 MMOL/L (ref 136–145)
SP GR UR STRIP.AUTO: 1.03 (ref 1–1.03)
T4 FREE SERPL-MCNC: 1.29 NG/DL (ref 0.76–1.46)
TROPONIN I SERPL-MCNC: <0.02 NG/ML
TSH SERPL DL<=0.05 MIU/L-ACNC: 7.46 UIU/ML (ref 0.36–3.74)
UROBILINOGEN UR QL STRIP.AUTO: 0.2 E.U./DL
WBC # BLD AUTO: 28.06 THOUSAND/UL (ref 4.31–10.16)
WBC #/AREA URNS AUTO: ABNORMAL /HPF

## 2020-04-07 PROCEDURE — 99223 1ST HOSP IP/OBS HIGH 75: CPT | Performed by: INTERNAL MEDICINE

## 2020-04-07 PROCEDURE — 99285 EMERGENCY DEPT VISIT HI MDM: CPT | Performed by: EMERGENCY MEDICINE

## 2020-04-07 PROCEDURE — 87635 SARS-COV-2 COVID-19 AMP PRB: CPT | Performed by: EMERGENCY MEDICINE

## 2020-04-07 PROCEDURE — 93005 ELECTROCARDIOGRAM TRACING: CPT

## 2020-04-07 PROCEDURE — 84484 ASSAY OF TROPONIN QUANT: CPT | Performed by: EMERGENCY MEDICINE

## 2020-04-07 PROCEDURE — 85379 FIBRIN DEGRADATION QUANT: CPT | Performed by: EMERGENCY MEDICINE

## 2020-04-07 PROCEDURE — 99285 EMERGENCY DEPT VISIT HI MDM: CPT

## 2020-04-07 PROCEDURE — 83690 ASSAY OF LIPASE: CPT | Performed by: EMERGENCY MEDICINE

## 2020-04-07 PROCEDURE — 71275 CT ANGIOGRAPHY CHEST: CPT

## 2020-04-07 PROCEDURE — 84145 PROCALCITONIN (PCT): CPT | Performed by: EMERGENCY MEDICINE

## 2020-04-07 PROCEDURE — 71046 X-RAY EXAM CHEST 2 VIEWS: CPT

## 2020-04-07 PROCEDURE — 84439 ASSAY OF FREE THYROXINE: CPT | Performed by: EMERGENCY MEDICINE

## 2020-04-07 PROCEDURE — 82728 ASSAY OF FERRITIN: CPT | Performed by: EMERGENCY MEDICINE

## 2020-04-07 PROCEDURE — 87040 BLOOD CULTURE FOR BACTERIA: CPT | Performed by: EMERGENCY MEDICINE

## 2020-04-07 PROCEDURE — 83605 ASSAY OF LACTIC ACID: CPT | Performed by: EMERGENCY MEDICINE

## 2020-04-07 PROCEDURE — 84478 ASSAY OF TRIGLYCERIDES: CPT | Performed by: INTERNAL MEDICINE

## 2020-04-07 PROCEDURE — 96374 THER/PROPH/DIAG INJ IV PUSH: CPT

## 2020-04-07 PROCEDURE — 80053 COMPREHEN METABOLIC PANEL: CPT | Performed by: EMERGENCY MEDICINE

## 2020-04-07 PROCEDURE — 36415 COLL VENOUS BLD VENIPUNCTURE: CPT | Performed by: EMERGENCY MEDICINE

## 2020-04-07 PROCEDURE — 86140 C-REACTIVE PROTEIN: CPT | Performed by: EMERGENCY MEDICINE

## 2020-04-07 PROCEDURE — 84443 ASSAY THYROID STIM HORMONE: CPT | Performed by: EMERGENCY MEDICINE

## 2020-04-07 PROCEDURE — 85652 RBC SED RATE AUTOMATED: CPT | Performed by: EMERGENCY MEDICINE

## 2020-04-07 PROCEDURE — 85384 FIBRINOGEN ACTIVITY: CPT | Performed by: EMERGENCY MEDICINE

## 2020-04-07 PROCEDURE — 83615 LACTATE (LD) (LDH) ENZYME: CPT | Performed by: EMERGENCY MEDICINE

## 2020-04-07 PROCEDURE — 96361 HYDRATE IV INFUSION ADD-ON: CPT

## 2020-04-07 PROCEDURE — 85025 COMPLETE CBC W/AUTO DIFF WBC: CPT | Performed by: EMERGENCY MEDICINE

## 2020-04-07 PROCEDURE — 81001 URINALYSIS AUTO W/SCOPE: CPT | Performed by: EMERGENCY MEDICINE

## 2020-04-07 RX ORDER — ACETAMINOPHEN 325 MG/1
650 TABLET ORAL EVERY 6 HOURS PRN
Status: DISCONTINUED | OUTPATIENT
Start: 2020-04-07 | End: 2020-04-13 | Stop reason: HOSPADM

## 2020-04-07 RX ORDER — AMLODIPINE BESYLATE AND BENAZEPRIL HYDROCHLORIDE 10; 20 MG/1; MG/1
1 CAPSULE ORAL DAILY
COMMUNITY
End: 2020-04-16 | Stop reason: HOSPADM

## 2020-04-07 RX ORDER — GUAIFENESIN 100 MG/5ML
200 SOLUTION ORAL 3 TIMES DAILY PRN
Status: DISCONTINUED | OUTPATIENT
Start: 2020-04-07 | End: 2020-04-13 | Stop reason: HOSPADM

## 2020-04-07 RX ORDER — DIVALPROEX SODIUM 250 MG/1
250 TABLET, DELAYED RELEASE ORAL EVERY 8 HOURS SCHEDULED
Status: ON HOLD | COMMUNITY
End: 2020-04-16 | Stop reason: SDUPTHER

## 2020-04-07 RX ORDER — MELATONIN
1000 DAILY
COMMUNITY

## 2020-04-07 RX ORDER — OMEPRAZOLE 20 MG/1
20 CAPSULE, DELAYED RELEASE ORAL DAILY
COMMUNITY
End: 2020-10-16 | Stop reason: DRUGHIGH

## 2020-04-07 RX ORDER — MELATONIN
1000 DAILY
Status: DISCONTINUED | OUTPATIENT
Start: 2020-04-08 | End: 2020-04-13 | Stop reason: HOSPADM

## 2020-04-07 RX ORDER — AZITHROMYCIN 250 MG/1
250 TABLET, FILM COATED ORAL EVERY 24 HOURS
Status: DISCONTINUED | OUTPATIENT
Start: 2020-04-08 | End: 2020-04-11

## 2020-04-07 RX ORDER — LEVOTHYROXINE SODIUM 88 UG/1
88 TABLET ORAL
Status: DISCONTINUED | OUTPATIENT
Start: 2020-04-08 | End: 2020-04-13 | Stop reason: HOSPADM

## 2020-04-07 RX ORDER — PANTOPRAZOLE SODIUM 40 MG/1
40 TABLET, DELAYED RELEASE ORAL
Status: DISCONTINUED | OUTPATIENT
Start: 2020-04-08 | End: 2020-04-13 | Stop reason: HOSPADM

## 2020-04-07 RX ORDER — DIVALPROEX SODIUM 250 MG/1
250 TABLET, DELAYED RELEASE ORAL EVERY 8 HOURS SCHEDULED
Status: DISCONTINUED | OUTPATIENT
Start: 2020-04-07 | End: 2020-04-09

## 2020-04-07 RX ORDER — HYDROXYCHLOROQUINE SULFATE 200 MG/1
400 TABLET, FILM COATED ORAL EVERY 12 HOURS
Status: DISCONTINUED | OUTPATIENT
Start: 2020-04-07 | End: 2020-04-08

## 2020-04-07 RX ORDER — AZITHROMYCIN 250 MG/1
500 TABLET, FILM COATED ORAL EVERY 24 HOURS
Status: DISCONTINUED | OUTPATIENT
Start: 2020-04-08 | End: 2020-04-07

## 2020-04-07 RX ORDER — HYDROXYCHLOROQUINE SULFATE 200 MG/1
200 TABLET, FILM COATED ORAL 2 TIMES DAILY
Status: DISCONTINUED | OUTPATIENT
Start: 2020-04-08 | End: 2020-04-08

## 2020-04-07 RX ORDER — SODIUM CHLORIDE, SODIUM LACTATE, POTASSIUM CHLORIDE, CALCIUM CHLORIDE 600; 310; 30; 20 MG/100ML; MG/100ML; MG/100ML; MG/100ML
125 INJECTION, SOLUTION INTRAVENOUS CONTINUOUS
Status: DISCONTINUED | OUTPATIENT
Start: 2020-04-07 | End: 2020-04-07

## 2020-04-07 RX ORDER — LEVOTHYROXINE SODIUM 88 UG/1
88 TABLET ORAL DAILY
COMMUNITY
End: 2022-07-26

## 2020-04-07 RX ORDER — SODIUM CHLORIDE, SODIUM LACTATE, POTASSIUM CHLORIDE, CALCIUM CHLORIDE 600; 310; 30; 20 MG/100ML; MG/100ML; MG/100ML; MG/100ML
250 INJECTION, SOLUTION INTRAVENOUS CONTINUOUS
Status: DISCONTINUED | OUTPATIENT
Start: 2020-04-07 | End: 2020-04-08

## 2020-04-07 RX ADMIN — SODIUM CHLORIDE 1000 ML: 0.9 INJECTION, SOLUTION INTRAVENOUS at 21:33

## 2020-04-07 RX ADMIN — CEFTRIAXONE SODIUM 1000 MG: 10 INJECTION, POWDER, FOR SOLUTION INTRAVENOUS at 21:08

## 2020-04-07 RX ADMIN — IOHEXOL 85 ML: 350 INJECTION, SOLUTION INTRAVENOUS at 19:45

## 2020-04-07 RX ADMIN — AZITHROMYCIN MONOHYDRATE 500 MG: 500 INJECTION, POWDER, LYOPHILIZED, FOR SOLUTION INTRAVENOUS at 23:38

## 2020-04-07 RX ADMIN — SODIUM CHLORIDE, SODIUM LACTATE, POTASSIUM CHLORIDE, AND CALCIUM CHLORIDE 250 ML/HR: .6; .31; .03; .02 INJECTION, SOLUTION INTRAVENOUS at 23:04

## 2020-04-07 RX ADMIN — AZITHROMYCIN 500 MG: 500 INJECTION, POWDER, LYOPHILIZED, FOR SOLUTION INTRAVENOUS at 21:32

## 2020-04-07 RX ADMIN — SODIUM CHLORIDE 1000 ML: 0.9 INJECTION, SOLUTION INTRAVENOUS at 18:04

## 2020-04-08 ENCOUNTER — APPOINTMENT (INPATIENT)
Dept: RADIOLOGY | Facility: HOSPITAL | Age: 85
DRG: 871 | End: 2020-04-08
Payer: COMMERCIAL

## 2020-04-08 PROBLEM — R07.1 CHEST PAIN ON BREATHING: Status: RESOLVED | Noted: 2018-11-09 | Resolved: 2020-04-08

## 2020-04-08 PROBLEM — A41.9 SEPSIS (HCC): Status: ACTIVE | Noted: 2020-04-08

## 2020-04-08 PROBLEM — K85.90 PANCREATITIS: Status: ACTIVE | Noted: 2020-04-08

## 2020-04-08 PROBLEM — R91.8 GROUND GLASS OPACITY PRESENT ON IMAGING OF LUNG: Status: ACTIVE | Noted: 2020-04-08

## 2020-04-08 LAB
ANION GAP SERPL CALCULATED.3IONS-SCNC: 8 MMOL/L (ref 4–13)
ATRIAL RATE: 100 BPM
ATRIAL RATE: 124 BPM
BASOPHILS # BLD AUTO: 0.03 THOUSANDS/ΜL (ref 0–0.1)
BASOPHILS NFR BLD AUTO: 0 % (ref 0–1)
BUN SERPL-MCNC: 14 MG/DL (ref 5–25)
CALCIUM SERPL-MCNC: 8.4 MG/DL (ref 8.3–10.1)
CHLORIDE SERPL-SCNC: 108 MMOL/L (ref 100–108)
CO2 SERPL-SCNC: 25 MMOL/L (ref 21–32)
CREAT SERPL-MCNC: 0.7 MG/DL (ref 0.6–1.3)
EOSINOPHIL # BLD AUTO: 0 THOUSAND/ΜL (ref 0–0.61)
EOSINOPHIL NFR BLD AUTO: 0 % (ref 0–6)
ERYTHROCYTE [DISTWIDTH] IN BLOOD BY AUTOMATED COUNT: 14 % (ref 11.6–15.1)
EST. AVERAGE GLUCOSE BLD GHB EST-MCNC: 157 MG/DL
GFR SERPL CREATININE-BSD FRML MDRD: 79 ML/MIN/1.73SQ M
GLUCOSE SERPL-MCNC: 180 MG/DL (ref 65–140)
GLUCOSE SERPL-MCNC: 195 MG/DL (ref 65–140)
GLUCOSE SERPL-MCNC: 195 MG/DL (ref 65–140)
GLUCOSE SERPL-MCNC: 199 MG/DL (ref 65–140)
GLUCOSE SERPL-MCNC: 206 MG/DL (ref 65–140)
GLUCOSE SERPL-MCNC: 209 MG/DL (ref 65–140)
HBA1C MFR BLD: 7.1 %
HCT VFR BLD AUTO: 42.7 % (ref 34.8–46.1)
HGB BLD-MCNC: 14 G/DL (ref 11.5–15.4)
IMM GRANULOCYTES # BLD AUTO: 0.42 THOUSAND/UL (ref 0–0.2)
IMM GRANULOCYTES NFR BLD AUTO: 2 % (ref 0–2)
LACTATE SERPL-SCNC: 2.2 MMOL/L (ref 0.5–2)
LACTATE SERPL-SCNC: 3.6 MMOL/L (ref 0.5–2)
LACTATE SERPL-SCNC: 4.3 MMOL/L (ref 0.5–2)
LYMPHOCYTES # BLD AUTO: 1 THOUSANDS/ΜL (ref 0.6–4.47)
LYMPHOCYTES NFR BLD AUTO: 4 % (ref 14–44)
MCH RBC QN AUTO: 32.6 PG (ref 26.8–34.3)
MCHC RBC AUTO-ENTMCNC: 32.8 G/DL (ref 31.4–37.4)
MCV RBC AUTO: 100 FL (ref 82–98)
MONOCYTES # BLD AUTO: 1.63 THOUSAND/ΜL (ref 0.17–1.22)
MONOCYTES NFR BLD AUTO: 7 % (ref 4–12)
NEUTROPHILS # BLD AUTO: 21.19 THOUSANDS/ΜL (ref 1.85–7.62)
NEUTS SEG NFR BLD AUTO: 87 % (ref 43–75)
NRBC BLD AUTO-RTO: 0 /100 WBCS
P AXIS: 16 DEGREES
P AXIS: 5 DEGREES
PLATELET # BLD AUTO: 242 THOUSANDS/UL (ref 149–390)
PMV BLD AUTO: 11.7 FL (ref 8.9–12.7)
POTASSIUM SERPL-SCNC: 3.5 MMOL/L (ref 3.5–5.3)
PR INTERVAL: 148 MS
PR INTERVAL: 162 MS
PROCALCITONIN SERPL-MCNC: 0.2 NG/ML
QRS AXIS: -46 DEGREES
QRS AXIS: 259 DEGREES
QRSD INTERVAL: 88 MS
QRSD INTERVAL: 98 MS
QT INTERVAL: 322 MS
QT INTERVAL: 384 MS
QTC INTERVAL: 462 MS
QTC INTERVAL: 495 MS
RBC # BLD AUTO: 4.29 MILLION/UL (ref 3.81–5.12)
SARS-COV-2 RNA RESP QL NAA+PROBE: NEGATIVE
SODIUM SERPL-SCNC: 141 MMOL/L (ref 136–145)
T WAVE AXIS: 72 DEGREES
T WAVE AXIS: 94 DEGREES
TRIGL SERPL-MCNC: 93 MG/DL
VENTRICULAR RATE: 100 BPM
VENTRICULAR RATE: 124 BPM
WBC # BLD AUTO: 24.27 THOUSAND/UL (ref 4.31–10.16)

## 2020-04-08 PROCEDURE — 83605 ASSAY OF LACTIC ACID: CPT | Performed by: PHYSICIAN ASSISTANT

## 2020-04-08 PROCEDURE — 82948 REAGENT STRIP/BLOOD GLUCOSE: CPT

## 2020-04-08 PROCEDURE — 76705 ECHO EXAM OF ABDOMEN: CPT

## 2020-04-08 PROCEDURE — 99223 1ST HOSP IP/OBS HIGH 75: CPT | Performed by: INTERNAL MEDICINE

## 2020-04-08 PROCEDURE — 93010 ELECTROCARDIOGRAM REPORT: CPT | Performed by: INTERNAL MEDICINE

## 2020-04-08 PROCEDURE — 84145 PROCALCITONIN (PCT): CPT | Performed by: INTERNAL MEDICINE

## 2020-04-08 PROCEDURE — 93005 ELECTROCARDIOGRAM TRACING: CPT

## 2020-04-08 PROCEDURE — 99232 SBSQ HOSP IP/OBS MODERATE 35: CPT | Performed by: INTERNAL MEDICINE

## 2020-04-08 PROCEDURE — 83036 HEMOGLOBIN GLYCOSYLATED A1C: CPT | Performed by: INTERNAL MEDICINE

## 2020-04-08 PROCEDURE — 85025 COMPLETE CBC W/AUTO DIFF WBC: CPT | Performed by: INTERNAL MEDICINE

## 2020-04-08 PROCEDURE — 83605 ASSAY OF LACTIC ACID: CPT | Performed by: INTERNAL MEDICINE

## 2020-04-08 PROCEDURE — 80048 BASIC METABOLIC PNL TOTAL CA: CPT | Performed by: INTERNAL MEDICINE

## 2020-04-08 RX ORDER — LISINOPRIL 20 MG/1
20 TABLET ORAL DAILY
Status: DISCONTINUED | OUTPATIENT
Start: 2020-04-08 | End: 2020-04-13 | Stop reason: HOSPADM

## 2020-04-08 RX ORDER — HYDROXYCHLOROQUINE SULFATE 200 MG/1
200 TABLET, FILM COATED ORAL 2 TIMES DAILY
Status: DISCONTINUED | OUTPATIENT
Start: 2020-04-08 | End: 2020-04-08

## 2020-04-08 RX ORDER — AMLODIPINE BESYLATE 10 MG/1
10 TABLET ORAL DAILY
Status: DISCONTINUED | OUTPATIENT
Start: 2020-04-08 | End: 2020-04-13 | Stop reason: HOSPADM

## 2020-04-08 RX ORDER — INSULIN GLARGINE 100 [IU]/ML
10 INJECTION, SOLUTION SUBCUTANEOUS
Status: DISCONTINUED | OUTPATIENT
Start: 2020-04-08 | End: 2020-04-13 | Stop reason: HOSPADM

## 2020-04-08 RX ORDER — SODIUM CHLORIDE 9 MG/ML
100 INJECTION, SOLUTION INTRAVENOUS CONTINUOUS
Status: DISCONTINUED | OUTPATIENT
Start: 2020-04-08 | End: 2020-04-09

## 2020-04-08 RX ADMIN — LISINOPRIL 20 MG: 20 TABLET ORAL at 08:44

## 2020-04-08 RX ADMIN — PANTOPRAZOLE SODIUM 40 MG: 40 TABLET, DELAYED RELEASE ORAL at 05:46

## 2020-04-08 RX ADMIN — INSULIN LISPRO 1 UNITS: 100 INJECTION, SOLUTION INTRAVENOUS; SUBCUTANEOUS at 02:00

## 2020-04-08 RX ADMIN — ENOXAPARIN SODIUM 40 MG: 40 INJECTION SUBCUTANEOUS at 08:44

## 2020-04-08 RX ADMIN — INSULIN LISPRO 1 UNITS: 100 INJECTION, SOLUTION INTRAVENOUS; SUBCUTANEOUS at 06:13

## 2020-04-08 RX ADMIN — SODIUM CHLORIDE 100 ML/HR: 0.9 INJECTION, SOLUTION INTRAVENOUS at 21:23

## 2020-04-08 RX ADMIN — SODIUM CHLORIDE 100 ML/HR: 0.9 INJECTION, SOLUTION INTRAVENOUS at 09:48

## 2020-04-08 RX ADMIN — MELATONIN 1000 UNITS: at 08:44

## 2020-04-08 RX ADMIN — INSULIN LISPRO 1 UNITS: 100 INJECTION, SOLUTION INTRAVENOUS; SUBCUTANEOUS at 21:23

## 2020-04-08 RX ADMIN — CEFTRIAXONE SODIUM 1000 MG: 10 INJECTION, POWDER, FOR SOLUTION INTRAVENOUS at 21:22

## 2020-04-08 RX ADMIN — DIVALPROEX SODIUM 250 MG: 250 TABLET, DELAYED RELEASE ORAL at 21:22

## 2020-04-08 RX ADMIN — DIVALPROEX SODIUM 250 MG: 250 TABLET, DELAYED RELEASE ORAL at 05:46

## 2020-04-08 RX ADMIN — INSULIN GLARGINE 10 UNITS: 100 INJECTION, SOLUTION SUBCUTANEOUS at 21:22

## 2020-04-08 RX ADMIN — LEVOTHYROXINE SODIUM 88 MCG: 88 TABLET ORAL at 05:46

## 2020-04-08 RX ADMIN — INSULIN LISPRO 1 UNITS: 100 INJECTION, SOLUTION INTRAVENOUS; SUBCUTANEOUS at 14:08

## 2020-04-08 RX ADMIN — INSULIN LISPRO 1 UNITS: 100 INJECTION, SOLUTION INTRAVENOUS; SUBCUTANEOUS at 17:44

## 2020-04-08 RX ADMIN — SODIUM CHLORIDE, SODIUM LACTATE, POTASSIUM CHLORIDE, AND CALCIUM CHLORIDE 250 ML/HR: .6; .31; .03; .02 INJECTION, SOLUTION INTRAVENOUS at 03:50

## 2020-04-08 RX ADMIN — AMLODIPINE BESYLATE 10 MG: 10 TABLET ORAL at 08:43

## 2020-04-08 RX ADMIN — HYDROXYCHLOROQUINE SULFATE 400 MG: 200 TABLET, FILM COATED ORAL at 00:02

## 2020-04-08 RX ADMIN — AZITHROMYCIN 250 MG: 250 TABLET, FILM COATED ORAL at 21:22

## 2020-04-08 RX ADMIN — DIVALPROEX SODIUM 250 MG: 250 TABLET, DELAYED RELEASE ORAL at 00:03

## 2020-04-09 LAB
ALBUMIN SERPL BCP-MCNC: 2 G/DL (ref 3.5–5)
ALP SERPL-CCNC: 93 U/L (ref 46–116)
ALT SERPL W P-5'-P-CCNC: 15 U/L (ref 12–78)
ANION GAP SERPL CALCULATED.3IONS-SCNC: 7 MMOL/L (ref 4–13)
AST SERPL W P-5'-P-CCNC: 57 U/L (ref 5–45)
ATRIAL RATE: 87 BPM
BASOPHILS # BLD AUTO: 0.04 THOUSANDS/ΜL (ref 0–0.1)
BASOPHILS NFR BLD AUTO: 0 % (ref 0–1)
BILIRUB SERPL-MCNC: 0.47 MG/DL (ref 0.2–1)
BUN SERPL-MCNC: 14 MG/DL (ref 5–25)
CALCIUM SERPL-MCNC: 7.9 MG/DL (ref 8.3–10.1)
CHLORIDE SERPL-SCNC: 109 MMOL/L (ref 100–108)
CO2 SERPL-SCNC: 24 MMOL/L (ref 21–32)
CREAT SERPL-MCNC: 0.47 MG/DL (ref 0.6–1.3)
EOSINOPHIL # BLD AUTO: 0.01 THOUSAND/ΜL (ref 0–0.61)
EOSINOPHIL NFR BLD AUTO: 0 % (ref 0–6)
ERYTHROCYTE [DISTWIDTH] IN BLOOD BY AUTOMATED COUNT: 13.9 % (ref 11.6–15.1)
GFR SERPL CREATININE-BSD FRML MDRD: 90 ML/MIN/1.73SQ M
GLUCOSE SERPL-MCNC: 149 MG/DL (ref 65–140)
GLUCOSE SERPL-MCNC: 150 MG/DL (ref 65–140)
GLUCOSE SERPL-MCNC: 162 MG/DL (ref 65–140)
GLUCOSE SERPL-MCNC: 200 MG/DL (ref 65–140)
GLUCOSE SERPL-MCNC: 206 MG/DL (ref 65–140)
HCT VFR BLD AUTO: 41 % (ref 34.8–46.1)
HGB BLD-MCNC: 13.4 G/DL (ref 11.5–15.4)
IMM GRANULOCYTES # BLD AUTO: 0.24 THOUSAND/UL (ref 0–0.2)
IMM GRANULOCYTES NFR BLD AUTO: 1 % (ref 0–2)
LIPASE SERPL-CCNC: 73 U/L (ref 73–393)
LYMPHOCYTES # BLD AUTO: 1.03 THOUSANDS/ΜL (ref 0.6–4.47)
LYMPHOCYTES NFR BLD AUTO: 5 % (ref 14–44)
MAGNESIUM SERPL-MCNC: 1.9 MG/DL (ref 1.6–2.6)
MCH RBC QN AUTO: 33.3 PG (ref 26.8–34.3)
MCHC RBC AUTO-ENTMCNC: 32.7 G/DL (ref 31.4–37.4)
MCV RBC AUTO: 102 FL (ref 82–98)
MONOCYTES # BLD AUTO: 1.46 THOUSAND/ΜL (ref 0.17–1.22)
MONOCYTES NFR BLD AUTO: 8 % (ref 4–12)
NEUTROPHILS # BLD AUTO: 16.25 THOUSANDS/ΜL (ref 1.85–7.62)
NEUTS SEG NFR BLD AUTO: 86 % (ref 43–75)
NRBC BLD AUTO-RTO: 0 /100 WBCS
P AXIS: 35 DEGREES
PHOSPHATE SERPL-MCNC: 2.1 MG/DL (ref 2.3–4.1)
PLATELET # BLD AUTO: 213 THOUSANDS/UL (ref 149–390)
PMV BLD AUTO: 12 FL (ref 8.9–12.7)
POTASSIUM SERPL-SCNC: 4.6 MMOL/L (ref 3.5–5.3)
PR INTERVAL: 138 MS
PROT SERPL-MCNC: 5.8 G/DL (ref 6.4–8.2)
QRS AXIS: -41 DEGREES
QRSD INTERVAL: 98 MS
QT INTERVAL: 536 MS
QTC INTERVAL: 644 MS
RBC # BLD AUTO: 4.03 MILLION/UL (ref 3.81–5.12)
SODIUM SERPL-SCNC: 140 MMOL/L (ref 136–145)
T WAVE AXIS: 91 DEGREES
VENTRICULAR RATE: 87 BPM
WBC # BLD AUTO: 19.03 THOUSAND/UL (ref 4.31–10.16)

## 2020-04-09 PROCEDURE — 92610 EVALUATE SWALLOWING FUNCTION: CPT

## 2020-04-09 PROCEDURE — 99233 SBSQ HOSP IP/OBS HIGH 50: CPT | Performed by: INTERNAL MEDICINE

## 2020-04-09 PROCEDURE — 93010 ELECTROCARDIOGRAM REPORT: CPT | Performed by: INTERNAL MEDICINE

## 2020-04-09 PROCEDURE — 82948 REAGENT STRIP/BLOOD GLUCOSE: CPT

## 2020-04-09 PROCEDURE — 83690 ASSAY OF LIPASE: CPT | Performed by: INTERNAL MEDICINE

## 2020-04-09 PROCEDURE — 83735 ASSAY OF MAGNESIUM: CPT | Performed by: INTERNAL MEDICINE

## 2020-04-09 PROCEDURE — 93005 ELECTROCARDIOGRAM TRACING: CPT

## 2020-04-09 PROCEDURE — 85025 COMPLETE CBC W/AUTO DIFF WBC: CPT | Performed by: INTERNAL MEDICINE

## 2020-04-09 PROCEDURE — 84100 ASSAY OF PHOSPHORUS: CPT | Performed by: INTERNAL MEDICINE

## 2020-04-09 PROCEDURE — 87081 CULTURE SCREEN ONLY: CPT | Performed by: INTERNAL MEDICINE

## 2020-04-09 PROCEDURE — 80053 COMPREHEN METABOLIC PANEL: CPT | Performed by: INTERNAL MEDICINE

## 2020-04-09 RX ADMIN — AZITHROMYCIN 250 MG: 250 TABLET, FILM COATED ORAL at 22:25

## 2020-04-09 RX ADMIN — INSULIN LISPRO 1 UNITS: 100 INJECTION, SOLUTION INTRAVENOUS; SUBCUTANEOUS at 12:07

## 2020-04-09 RX ADMIN — INSULIN LISPRO 1 UNITS: 100 INJECTION, SOLUTION INTRAVENOUS; SUBCUTANEOUS at 18:13

## 2020-04-09 RX ADMIN — MELATONIN 1000 UNITS: at 09:23

## 2020-04-09 RX ADMIN — INSULIN GLARGINE 10 UNITS: 100 INJECTION, SOLUTION SUBCUTANEOUS at 22:25

## 2020-04-09 RX ADMIN — LISINOPRIL 20 MG: 20 TABLET ORAL at 09:23

## 2020-04-09 RX ADMIN — SODIUM CHLORIDE 100 ML/HR: 0.9 INJECTION, SOLUTION INTRAVENOUS at 07:33

## 2020-04-09 RX ADMIN — ENOXAPARIN SODIUM 40 MG: 40 INJECTION SUBCUTANEOUS at 09:22

## 2020-04-09 RX ADMIN — DIVALPROEX SODIUM 250 MG: 250 TABLET, DELAYED RELEASE ORAL at 13:49

## 2020-04-09 RX ADMIN — DIBASIC SODIUM PHOSPHATE, MONOBASIC POTASSIUM PHOSPHATE AND MONOBASIC SODIUM PHOSPHATE 1 TABLET: 852; 155; 130 TABLET ORAL at 09:23

## 2020-04-09 RX ADMIN — DIBASIC SODIUM PHOSPHATE, MONOBASIC POTASSIUM PHOSPHATE AND MONOBASIC SODIUM PHOSPHATE 1 TABLET: 852; 155; 130 TABLET ORAL at 18:14

## 2020-04-09 RX ADMIN — PANTOPRAZOLE SODIUM 40 MG: 40 TABLET, DELAYED RELEASE ORAL at 06:17

## 2020-04-09 RX ADMIN — VALPROIC ACID 250 MG: 500 SOLUTION ORAL at 23:27

## 2020-04-09 RX ADMIN — DIVALPROEX SODIUM 250 MG: 250 TABLET, DELAYED RELEASE ORAL at 06:17

## 2020-04-09 RX ADMIN — AMLODIPINE BESYLATE 10 MG: 10 TABLET ORAL at 09:23

## 2020-04-09 RX ADMIN — LEVOTHYROXINE SODIUM 88 MCG: 88 TABLET ORAL at 06:17

## 2020-04-09 RX ADMIN — CEFTRIAXONE SODIUM 1000 MG: 10 INJECTION, POWDER, FOR SOLUTION INTRAVENOUS at 21:59

## 2020-04-09 RX ADMIN — INSULIN LISPRO 1 UNITS: 100 INJECTION, SOLUTION INTRAVENOUS; SUBCUTANEOUS at 09:27

## 2020-04-10 PROBLEM — E87.6 HYPOKALEMIA: Status: ACTIVE | Noted: 2020-04-10

## 2020-04-10 LAB
ALBUMIN SERPL BCP-MCNC: 2 G/DL (ref 3.5–5)
ALP SERPL-CCNC: 96 U/L (ref 46–116)
ALT SERPL W P-5'-P-CCNC: 17 U/L (ref 12–78)
ANION GAP SERPL CALCULATED.3IONS-SCNC: 4 MMOL/L (ref 4–13)
AST SERPL W P-5'-P-CCNC: 19 U/L (ref 5–45)
BASOPHILS # BLD AUTO: 0.03 THOUSANDS/ΜL (ref 0–0.1)
BASOPHILS NFR BLD AUTO: 0 % (ref 0–1)
BILIRUB SERPL-MCNC: 0.47 MG/DL (ref 0.2–1)
BUN SERPL-MCNC: 13 MG/DL (ref 5–25)
CALCIUM SERPL-MCNC: 7.8 MG/DL (ref 8.3–10.1)
CHLORIDE SERPL-SCNC: 103 MMOL/L (ref 100–108)
CO2 SERPL-SCNC: 28 MMOL/L (ref 21–32)
CREAT SERPL-MCNC: 0.51 MG/DL (ref 0.6–1.3)
EOSINOPHIL # BLD AUTO: 0.05 THOUSAND/ΜL (ref 0–0.61)
EOSINOPHIL NFR BLD AUTO: 0 % (ref 0–6)
ERYTHROCYTE [DISTWIDTH] IN BLOOD BY AUTOMATED COUNT: 13.5 % (ref 11.6–15.1)
GFR SERPL CREATININE-BSD FRML MDRD: 88 ML/MIN/1.73SQ M
GLUCOSE SERPL-MCNC: 142 MG/DL (ref 65–140)
GLUCOSE SERPL-MCNC: 166 MG/DL (ref 65–140)
GLUCOSE SERPL-MCNC: 220 MG/DL (ref 65–140)
GLUCOSE SERPL-MCNC: 243 MG/DL (ref 65–140)
GLUCOSE SERPL-MCNC: 257 MG/DL (ref 65–140)
HCT VFR BLD AUTO: 37.9 % (ref 34.8–46.1)
HGB BLD-MCNC: 12.9 G/DL (ref 11.5–15.4)
IMM GRANULOCYTES # BLD AUTO: 0.19 THOUSAND/UL (ref 0–0.2)
IMM GRANULOCYTES NFR BLD AUTO: 1 % (ref 0–2)
LYMPHOCYTES # BLD AUTO: 0.89 THOUSANDS/ΜL (ref 0.6–4.47)
LYMPHOCYTES NFR BLD AUTO: 6 % (ref 14–44)
MCH RBC QN AUTO: 33.2 PG (ref 26.8–34.3)
MCHC RBC AUTO-ENTMCNC: 34 G/DL (ref 31.4–37.4)
MCV RBC AUTO: 97 FL (ref 82–98)
MONOCYTES # BLD AUTO: 1.44 THOUSAND/ΜL (ref 0.17–1.22)
MONOCYTES NFR BLD AUTO: 10 % (ref 4–12)
NEUTROPHILS # BLD AUTO: 11.34 THOUSANDS/ΜL (ref 1.85–7.62)
NEUTS SEG NFR BLD AUTO: 83 % (ref 43–75)
NRBC BLD AUTO-RTO: 0 /100 WBCS
PLATELET # BLD AUTO: 222 THOUSANDS/UL (ref 149–390)
PMV BLD AUTO: 11.7 FL (ref 8.9–12.7)
POTASSIUM SERPL-SCNC: 2.7 MMOL/L (ref 3.5–5.3)
PROT SERPL-MCNC: 5.2 G/DL (ref 6.4–8.2)
RBC # BLD AUTO: 3.89 MILLION/UL (ref 3.81–5.12)
SODIUM SERPL-SCNC: 135 MMOL/L (ref 136–145)
WBC # BLD AUTO: 13.94 THOUSAND/UL (ref 4.31–10.16)

## 2020-04-10 PROCEDURE — 92526 ORAL FUNCTION THERAPY: CPT

## 2020-04-10 PROCEDURE — 99232 SBSQ HOSP IP/OBS MODERATE 35: CPT | Performed by: INTERNAL MEDICINE

## 2020-04-10 PROCEDURE — 80053 COMPREHEN METABOLIC PANEL: CPT | Performed by: INTERNAL MEDICINE

## 2020-04-10 PROCEDURE — 85025 COMPLETE CBC W/AUTO DIFF WBC: CPT | Performed by: INTERNAL MEDICINE

## 2020-04-10 PROCEDURE — ND001 PR NO DOCUMENTATION: Performed by: INTERNAL MEDICINE

## 2020-04-10 PROCEDURE — 82948 REAGENT STRIP/BLOOD GLUCOSE: CPT

## 2020-04-10 PROCEDURE — 93005 ELECTROCARDIOGRAM TRACING: CPT

## 2020-04-10 RX ORDER — POTASSIUM CHLORIDE 14.9 MG/ML
20 INJECTION INTRAVENOUS
Status: COMPLETED | OUTPATIENT
Start: 2020-04-10 | End: 2020-04-10

## 2020-04-10 RX ADMIN — AMLODIPINE BESYLATE 10 MG: 10 TABLET ORAL at 08:31

## 2020-04-10 RX ADMIN — LEVOTHYROXINE SODIUM 88 MCG: 88 TABLET ORAL at 06:03

## 2020-04-10 RX ADMIN — AZITHROMYCIN 250 MG: 250 TABLET, FILM COATED ORAL at 21:44

## 2020-04-10 RX ADMIN — POTASSIUM CHLORIDE 20 MEQ: 14.9 INJECTION, SOLUTION INTRAVENOUS at 09:45

## 2020-04-10 RX ADMIN — INSULIN GLARGINE 10 UNITS: 100 INJECTION, SOLUTION SUBCUTANEOUS at 21:45

## 2020-04-10 RX ADMIN — INSULIN LISPRO 2 UNITS: 100 INJECTION, SOLUTION INTRAVENOUS; SUBCUTANEOUS at 11:33

## 2020-04-10 RX ADMIN — POTASSIUM CHLORIDE 20 MEQ: 14.9 INJECTION, SOLUTION INTRAVENOUS at 07:49

## 2020-04-10 RX ADMIN — ENOXAPARIN SODIUM 40 MG: 40 INJECTION SUBCUTANEOUS at 08:31

## 2020-04-10 RX ADMIN — INSULIN LISPRO 1 UNITS: 100 INJECTION, SOLUTION INTRAVENOUS; SUBCUTANEOUS at 08:32

## 2020-04-10 RX ADMIN — LISINOPRIL 20 MG: 20 TABLET ORAL at 08:31

## 2020-04-10 RX ADMIN — VALPROIC ACID 250 MG: 500 SOLUTION ORAL at 13:32

## 2020-04-10 RX ADMIN — POTASSIUM CHLORIDE 20 MEQ: 14.9 INJECTION, SOLUTION INTRAVENOUS at 08:42

## 2020-04-10 RX ADMIN — VALPROIC ACID 250 MG: 500 SOLUTION ORAL at 06:02

## 2020-04-10 RX ADMIN — INSULIN LISPRO 2 UNITS: 100 INJECTION, SOLUTION INTRAVENOUS; SUBCUTANEOUS at 21:52

## 2020-04-10 RX ADMIN — CEFTRIAXONE SODIUM 1000 MG: 10 INJECTION, POWDER, FOR SOLUTION INTRAVENOUS at 21:42

## 2020-04-10 RX ADMIN — MELATONIN 1000 UNITS: at 08:31

## 2020-04-10 RX ADMIN — VALPROIC ACID 250 MG: 500 SOLUTION ORAL at 21:40

## 2020-04-11 LAB
ANION GAP SERPL CALCULATED.3IONS-SCNC: 4 MMOL/L (ref 4–13)
ATRIAL RATE: 77 BPM
ATRIAL RATE: 86 BPM
BUN SERPL-MCNC: 15 MG/DL (ref 5–25)
CALCIUM SERPL-MCNC: 7.9 MG/DL (ref 8.3–10.1)
CHLORIDE SERPL-SCNC: 101 MMOL/L (ref 100–108)
CO2 SERPL-SCNC: 30 MMOL/L (ref 21–32)
CREAT SERPL-MCNC: 0.58 MG/DL (ref 0.6–1.3)
GFR SERPL CREATININE-BSD FRML MDRD: 84 ML/MIN/1.73SQ M
GLUCOSE SERPL-MCNC: 131 MG/DL (ref 65–140)
GLUCOSE SERPL-MCNC: 157 MG/DL (ref 65–140)
GLUCOSE SERPL-MCNC: 163 MG/DL (ref 65–140)
GLUCOSE SERPL-MCNC: 220 MG/DL (ref 65–140)
GLUCOSE SERPL-MCNC: 222 MG/DL (ref 65–140)
MAGNESIUM SERPL-MCNC: 2.1 MG/DL (ref 1.6–2.6)
MRSA NOSE QL CULT: NORMAL
P AXIS: 1 DEGREES
P AXIS: 12 DEGREES
POTASSIUM SERPL-SCNC: 3.4 MMOL/L (ref 3.5–5.3)
PR INTERVAL: 146 MS
PR INTERVAL: 148 MS
QRS AXIS: -41 DEGREES
QRS AXIS: -42 DEGREES
QRSD INTERVAL: 102 MS
QRSD INTERVAL: 104 MS
QT INTERVAL: 390 MS
QT INTERVAL: 402 MS
QTC INTERVAL: 454 MS
QTC INTERVAL: 466 MS
SODIUM SERPL-SCNC: 135 MMOL/L (ref 136–145)
T WAVE AXIS: 247 DEGREES
T WAVE AXIS: 58 DEGREES
VENTRICULAR RATE: 77 BPM
VENTRICULAR RATE: 86 BPM

## 2020-04-11 PROCEDURE — 97163 PT EVAL HIGH COMPLEX 45 MIN: CPT

## 2020-04-11 PROCEDURE — 93010 ELECTROCARDIOGRAM REPORT: CPT | Performed by: INTERNAL MEDICINE

## 2020-04-11 PROCEDURE — 80048 BASIC METABOLIC PNL TOTAL CA: CPT | Performed by: INTERNAL MEDICINE

## 2020-04-11 PROCEDURE — 97166 OT EVAL MOD COMPLEX 45 MIN: CPT

## 2020-04-11 PROCEDURE — 83735 ASSAY OF MAGNESIUM: CPT | Performed by: INTERNAL MEDICINE

## 2020-04-11 PROCEDURE — 82948 REAGENT STRIP/BLOOD GLUCOSE: CPT

## 2020-04-11 PROCEDURE — 93005 ELECTROCARDIOGRAM TRACING: CPT

## 2020-04-11 PROCEDURE — 99232 SBSQ HOSP IP/OBS MODERATE 35: CPT | Performed by: INTERNAL MEDICINE

## 2020-04-11 RX ORDER — AZITHROMYCIN 250 MG/1
250 TABLET, FILM COATED ORAL EVERY 24 HOURS
Status: COMPLETED | OUTPATIENT
Start: 2020-04-11 | End: 2020-04-11

## 2020-04-11 RX ORDER — POTASSIUM CHLORIDE 20 MEQ/1
40 TABLET, EXTENDED RELEASE ORAL ONCE
Status: COMPLETED | OUTPATIENT
Start: 2020-04-11 | End: 2020-04-11

## 2020-04-11 RX ADMIN — ENOXAPARIN SODIUM 40 MG: 40 INJECTION SUBCUTANEOUS at 08:55

## 2020-04-11 RX ADMIN — AMLODIPINE BESYLATE 10 MG: 10 TABLET ORAL at 08:56

## 2020-04-11 RX ADMIN — VALPROIC ACID 250 MG: 500 SOLUTION ORAL at 14:21

## 2020-04-11 RX ADMIN — INSULIN LISPRO 1 UNITS: 100 INJECTION, SOLUTION INTRAVENOUS; SUBCUTANEOUS at 16:56

## 2020-04-11 RX ADMIN — VALPROIC ACID 250 MG: 500 SOLUTION ORAL at 22:11

## 2020-04-11 RX ADMIN — INSULIN LISPRO 1 UNITS: 100 INJECTION, SOLUTION INTRAVENOUS; SUBCUTANEOUS at 22:11

## 2020-04-11 RX ADMIN — INSULIN LISPRO 1 UNITS: 100 INJECTION, SOLUTION INTRAVENOUS; SUBCUTANEOUS at 07:39

## 2020-04-11 RX ADMIN — MELATONIN 1000 UNITS: at 08:55

## 2020-04-11 RX ADMIN — VALPROIC ACID 250 MG: 500 SOLUTION ORAL at 06:00

## 2020-04-11 RX ADMIN — CEFTRIAXONE SODIUM 1000 MG: 10 INJECTION, POWDER, FOR SOLUTION INTRAVENOUS at 22:10

## 2020-04-11 RX ADMIN — POTASSIUM CHLORIDE 40 MEQ: 1500 TABLET, EXTENDED RELEASE ORAL at 08:55

## 2020-04-11 RX ADMIN — AZITHROMYCIN 250 MG: 250 TABLET, FILM COATED ORAL at 22:10

## 2020-04-11 RX ADMIN — LISINOPRIL 20 MG: 20 TABLET ORAL at 08:55

## 2020-04-11 RX ADMIN — INSULIN GLARGINE 10 UNITS: 100 INJECTION, SOLUTION SUBCUTANEOUS at 22:10

## 2020-04-11 RX ADMIN — LEVOTHYROXINE SODIUM 88 MCG: 88 TABLET ORAL at 05:57

## 2020-04-11 RX ADMIN — INSULIN LISPRO 1 UNITS: 100 INJECTION, SOLUTION INTRAVENOUS; SUBCUTANEOUS at 11:59

## 2020-04-12 PROBLEM — K59.00 CONSTIPATION: Status: ACTIVE | Noted: 2020-04-12

## 2020-04-12 LAB
ANION GAP SERPL CALCULATED.3IONS-SCNC: 6 MMOL/L (ref 4–13)
ANISOCYTOSIS BLD QL SMEAR: PRESENT
ATRIAL RATE: 71 BPM
ATRIAL RATE: 73 BPM
ATRIAL RATE: 75 BPM
BASOPHILS # BLD MANUAL: 0 THOUSAND/UL (ref 0–0.1)
BASOPHILS NFR MAR MANUAL: 0 % (ref 0–1)
BUN SERPL-MCNC: 14 MG/DL (ref 5–25)
CALCIUM SERPL-MCNC: 7.8 MG/DL (ref 8.3–10.1)
CHLORIDE SERPL-SCNC: 103 MMOL/L (ref 100–108)
CO2 SERPL-SCNC: 28 MMOL/L (ref 21–32)
CREAT SERPL-MCNC: 0.53 MG/DL (ref 0.6–1.3)
EOSINOPHIL # BLD MANUAL: 0.11 THOUSAND/UL (ref 0–0.4)
EOSINOPHIL NFR BLD MANUAL: 1 % (ref 0–6)
ERYTHROCYTE [DISTWIDTH] IN BLOOD BY AUTOMATED COUNT: 13.3 % (ref 11.6–15.1)
GFR SERPL CREATININE-BSD FRML MDRD: 87 ML/MIN/1.73SQ M
GLUCOSE SERPL-MCNC: 136 MG/DL (ref 65–140)
GLUCOSE SERPL-MCNC: 140 MG/DL (ref 65–140)
GLUCOSE SERPL-MCNC: 142 MG/DL (ref 65–140)
GLUCOSE SERPL-MCNC: 193 MG/DL (ref 65–140)
GLUCOSE SERPL-MCNC: 223 MG/DL (ref 65–140)
HCT VFR BLD AUTO: 35.1 % (ref 34.8–46.1)
HGB BLD-MCNC: 11.7 G/DL (ref 11.5–15.4)
LYMPHOCYTES # BLD AUTO: 0.64 THOUSAND/UL (ref 0.6–4.47)
LYMPHOCYTES # BLD AUTO: 6 % (ref 14–44)
MCH RBC QN AUTO: 32.4 PG (ref 26.8–34.3)
MCHC RBC AUTO-ENTMCNC: 33.3 G/DL (ref 31.4–37.4)
MCV RBC AUTO: 97 FL (ref 82–98)
METAMYELOCYTES NFR BLD MANUAL: 13 % (ref 0–1)
MONOCYTES # BLD AUTO: 0.21 THOUSAND/UL (ref 0–1.22)
MONOCYTES NFR BLD: 2 % (ref 4–12)
NEUTROPHILS # BLD MANUAL: 8.38 THOUSAND/UL (ref 1.85–7.62)
NEUTS SEG NFR BLD AUTO: 78 % (ref 43–75)
NRBC BLD AUTO-RTO: 0 /100 WBCS
P AXIS: 11 DEGREES
P AXIS: 19 DEGREES
P AXIS: 19 DEGREES
PLATELET # BLD AUTO: 230 THOUSANDS/UL (ref 149–390)
PLATELET BLD QL SMEAR: ADEQUATE
PMV BLD AUTO: 11.1 FL (ref 8.9–12.7)
POTASSIUM SERPL-SCNC: 3.5 MMOL/L (ref 3.5–5.3)
PR INTERVAL: 150 MS
PR INTERVAL: 150 MS
PR INTERVAL: 154 MS
QRS AXIS: -39 DEGREES
QRS AXIS: -50 DEGREES
QRS AXIS: -51 DEGREES
QRSD INTERVAL: 88 MS
QRSD INTERVAL: 92 MS
QRSD INTERVAL: 94 MS
QT INTERVAL: 398 MS
QT INTERVAL: 404 MS
QT INTERVAL: 420 MS
QTC INTERVAL: 444 MS
QTC INTERVAL: 445 MS
QTC INTERVAL: 456 MS
RBC # BLD AUTO: 3.61 MILLION/UL (ref 3.81–5.12)
RBC MORPH BLD: PRESENT
SODIUM SERPL-SCNC: 137 MMOL/L (ref 136–145)
T WAVE AXIS: 49 DEGREES
T WAVE AXIS: 52 DEGREES
T WAVE AXIS: 79 DEGREES
VENTRICULAR RATE: 71 BPM
VENTRICULAR RATE: 73 BPM
VENTRICULAR RATE: 75 BPM
WBC # BLD AUTO: 10.74 THOUSAND/UL (ref 4.31–10.16)

## 2020-04-12 PROCEDURE — 93010 ELECTROCARDIOGRAM REPORT: CPT | Performed by: INTERNAL MEDICINE

## 2020-04-12 PROCEDURE — 93005 ELECTROCARDIOGRAM TRACING: CPT

## 2020-04-12 PROCEDURE — 85027 COMPLETE CBC AUTOMATED: CPT | Performed by: INTERNAL MEDICINE

## 2020-04-12 PROCEDURE — 80048 BASIC METABOLIC PNL TOTAL CA: CPT | Performed by: INTERNAL MEDICINE

## 2020-04-12 PROCEDURE — 99232 SBSQ HOSP IP/OBS MODERATE 35: CPT | Performed by: INTERNAL MEDICINE

## 2020-04-12 PROCEDURE — 82948 REAGENT STRIP/BLOOD GLUCOSE: CPT

## 2020-04-12 PROCEDURE — 85007 BL SMEAR W/DIFF WBC COUNT: CPT | Performed by: INTERNAL MEDICINE

## 2020-04-12 RX ORDER — AMOXICILLIN 250 MG
1 CAPSULE ORAL 2 TIMES DAILY
Status: DISCONTINUED | OUTPATIENT
Start: 2020-04-12 | End: 2020-04-13 | Stop reason: HOSPADM

## 2020-04-12 RX ORDER — POLYETHYLENE GLYCOL 3350 17 G/17G
17 POWDER, FOR SOLUTION ORAL DAILY
Status: DISCONTINUED | OUTPATIENT
Start: 2020-04-12 | End: 2020-04-13 | Stop reason: HOSPADM

## 2020-04-12 RX ADMIN — AMLODIPINE BESYLATE 10 MG: 10 TABLET ORAL at 08:14

## 2020-04-12 RX ADMIN — INSULIN LISPRO 1 UNITS: 100 INJECTION, SOLUTION INTRAVENOUS; SUBCUTANEOUS at 12:27

## 2020-04-12 RX ADMIN — VALPROIC ACID 250 MG: 500 SOLUTION ORAL at 05:37

## 2020-04-12 RX ADMIN — POLYETHYLENE GLYCOL 3350 17 G: 17 POWDER, FOR SOLUTION ORAL at 09:48

## 2020-04-12 RX ADMIN — VALPROIC ACID 250 MG: 500 SOLUTION ORAL at 12:28

## 2020-04-12 RX ADMIN — LISINOPRIL 20 MG: 20 TABLET ORAL at 08:14

## 2020-04-12 RX ADMIN — MELATONIN 1000 UNITS: at 08:14

## 2020-04-12 RX ADMIN — ENOXAPARIN SODIUM 40 MG: 40 INJECTION SUBCUTANEOUS at 08:14

## 2020-04-12 RX ADMIN — VALPROIC ACID 250 MG: 500 SOLUTION ORAL at 21:45

## 2020-04-12 RX ADMIN — INSULIN LISPRO 1 UNITS: 100 INJECTION, SOLUTION INTRAVENOUS; SUBCUTANEOUS at 21:44

## 2020-04-12 RX ADMIN — INSULIN GLARGINE 10 UNITS: 100 INJECTION, SOLUTION SUBCUTANEOUS at 21:44

## 2020-04-12 RX ADMIN — SENNOSIDES AND DOCUSATE SODIUM 1 TABLET: 8.6; 5 TABLET ORAL at 17:09

## 2020-04-12 RX ADMIN — SENNOSIDES AND DOCUSATE SODIUM 1 TABLET: 8.6; 5 TABLET ORAL at 09:48

## 2020-04-12 RX ADMIN — LEVOTHYROXINE SODIUM 88 MCG: 88 TABLET ORAL at 05:37

## 2020-04-13 VITALS
DIASTOLIC BLOOD PRESSURE: 57 MMHG | OXYGEN SATURATION: 100 % | HEART RATE: 83 BPM | HEIGHT: 61 IN | RESPIRATION RATE: 18 BRPM | SYSTOLIC BLOOD PRESSURE: 113 MMHG | WEIGHT: 167.99 LBS | BODY MASS INDEX: 31.72 KG/M2 | TEMPERATURE: 98.7 F

## 2020-04-13 PROBLEM — E87.6 HYPOKALEMIA: Status: RESOLVED | Noted: 2020-04-10 | Resolved: 2020-04-13

## 2020-04-13 LAB
ATRIAL RATE: 76 BPM
ATRIAL RATE: 77 BPM
BACTERIA BLD CULT: NORMAL
BACTERIA BLD CULT: NORMAL
GLUCOSE SERPL-MCNC: 147 MG/DL (ref 65–140)
GLUCOSE SERPL-MCNC: 188 MG/DL (ref 65–140)
P AXIS: 12 DEGREES
PR INTERVAL: 148 MS
QRS AXIS: -40 DEGREES
QRS AXIS: -41 DEGREES
QRSD INTERVAL: 102 MS
QRSD INTERVAL: 98 MS
QT INTERVAL: 394 MS
QT INTERVAL: 402 MS
QTC INTERVAL: 443 MS
QTC INTERVAL: 454 MS
T WAVE AXIS: 139 DEGREES
T WAVE AXIS: 247 DEGREES
VENTRICULAR RATE: 76 BPM
VENTRICULAR RATE: 77 BPM

## 2020-04-13 PROCEDURE — 93010 ELECTROCARDIOGRAM REPORT: CPT | Performed by: INTERNAL MEDICINE

## 2020-04-13 PROCEDURE — 82948 REAGENT STRIP/BLOOD GLUCOSE: CPT

## 2020-04-13 PROCEDURE — 93005 ELECTROCARDIOGRAM TRACING: CPT

## 2020-04-13 PROCEDURE — 99239 HOSP IP/OBS DSCHRG MGMT >30: CPT | Performed by: INTERNAL MEDICINE

## 2020-04-13 RX ADMIN — INSULIN LISPRO 1 UNITS: 100 INJECTION, SOLUTION INTRAVENOUS; SUBCUTANEOUS at 12:41

## 2020-04-13 RX ADMIN — ENOXAPARIN SODIUM 40 MG: 40 INJECTION SUBCUTANEOUS at 09:39

## 2020-04-13 RX ADMIN — AMLODIPINE BESYLATE 10 MG: 10 TABLET ORAL at 09:39

## 2020-04-13 RX ADMIN — MELATONIN 1000 UNITS: at 09:39

## 2020-04-13 RX ADMIN — SENNOSIDES AND DOCUSATE SODIUM 1 TABLET: 8.6; 5 TABLET ORAL at 09:39

## 2020-04-13 RX ADMIN — LEVOTHYROXINE SODIUM 88 MCG: 88 TABLET ORAL at 06:01

## 2020-04-13 RX ADMIN — VALPROIC ACID 250 MG: 500 SOLUTION ORAL at 05:59

## 2020-04-13 RX ADMIN — PANTOPRAZOLE SODIUM 40 MG: 40 TABLET, DELAYED RELEASE ORAL at 06:01

## 2020-04-13 RX ADMIN — LISINOPRIL 20 MG: 20 TABLET ORAL at 09:39

## 2020-04-13 RX ADMIN — POLYETHYLENE GLYCOL 3350 17 G: 17 POWDER, FOR SOLUTION ORAL at 09:39

## 2020-04-14 ENCOUNTER — HOSPITAL ENCOUNTER (INPATIENT)
Facility: HOSPITAL | Age: 85
LOS: 2 days | Discharge: NURSING, REHAB OR LONG-TERM CARE | DRG: 057 | End: 2020-04-16
Attending: EMERGENCY MEDICINE | Admitting: INTERNAL MEDICINE
Payer: COMMERCIAL

## 2020-04-14 ENCOUNTER — APPOINTMENT (EMERGENCY)
Dept: RADIOLOGY | Facility: HOSPITAL | Age: 85
DRG: 057 | End: 2020-04-14
Payer: COMMERCIAL

## 2020-04-14 DIAGNOSIS — R41.82 ALTERED MENTAL STATUS: Primary | ICD-10-CM

## 2020-04-14 DIAGNOSIS — K59.00 CONSTIPATION, UNSPECIFIED CONSTIPATION TYPE: ICD-10-CM

## 2020-04-14 DIAGNOSIS — Z74.09 IMPAIRED MOBILITY AND ADLS: ICD-10-CM

## 2020-04-14 DIAGNOSIS — F02.80 LATE ONSET ALZHEIMER'S DISEASE WITHOUT BEHAVIORAL DISTURBANCE (HCC): ICD-10-CM

## 2020-04-14 DIAGNOSIS — Z78.9 IMPAIRED MOBILITY AND ADLS: ICD-10-CM

## 2020-04-14 DIAGNOSIS — G30.1 LATE ONSET ALZHEIMER'S DISEASE WITHOUT BEHAVIORAL DISTURBANCE (HCC): ICD-10-CM

## 2020-04-14 DIAGNOSIS — E11.9 TYPE 2 DIABETES MELLITUS WITHOUT COMPLICATION, WITHOUT LONG-TERM CURRENT USE OF INSULIN (HCC): ICD-10-CM

## 2020-04-14 DIAGNOSIS — I10 ESSENTIAL HYPERTENSION: ICD-10-CM

## 2020-04-14 PROBLEM — A41.9 SEPSIS (HCC): Status: RESOLVED | Noted: 2020-04-08 | Resolved: 2020-04-14

## 2020-04-14 PROBLEM — K85.90 ACUTE PANCREATITIS: Status: RESOLVED | Noted: 2020-04-08 | Resolved: 2020-04-14

## 2020-04-14 PROBLEM — R62.7 FAILURE TO THRIVE IN ADULT: Status: ACTIVE | Noted: 2020-04-14

## 2020-04-14 LAB
ALBUMIN SERPL BCP-MCNC: 2.2 G/DL (ref 3.5–5)
ALP SERPL-CCNC: 126 U/L (ref 46–116)
ALT SERPL W P-5'-P-CCNC: 19 U/L (ref 12–78)
ANION GAP SERPL CALCULATED.3IONS-SCNC: 6 MMOL/L (ref 4–13)
AST SERPL W P-5'-P-CCNC: 24 U/L (ref 5–45)
BILIRUB SERPL-MCNC: 0.37 MG/DL (ref 0.2–1)
BUN SERPL-MCNC: 8 MG/DL (ref 5–25)
CALCIUM SERPL-MCNC: 8.3 MG/DL (ref 8.3–10.1)
CHLORIDE SERPL-SCNC: 100 MMOL/L (ref 100–108)
CO2 SERPL-SCNC: 31 MMOL/L (ref 21–32)
CREAT SERPL-MCNC: 0.62 MG/DL (ref 0.6–1.3)
ERYTHROCYTE [DISTWIDTH] IN BLOOD BY AUTOMATED COUNT: 13.6 % (ref 11.6–15.1)
FOLATE SERPL-MCNC: >20 NG/ML (ref 3.1–17.5)
GFR SERPL CREATININE-BSD FRML MDRD: 82 ML/MIN/1.73SQ M
GLUCOSE SERPL-MCNC: 159 MG/DL (ref 65–140)
GLUCOSE SERPL-MCNC: 164 MG/DL (ref 65–140)
HCT VFR BLD AUTO: 38 % (ref 34.8–46.1)
HGB BLD-MCNC: 12.6 G/DL (ref 11.5–15.4)
LIPASE SERPL-CCNC: 204 U/L (ref 73–393)
MCH RBC QN AUTO: 32.9 PG (ref 26.8–34.3)
MCHC RBC AUTO-ENTMCNC: 33.2 G/DL (ref 31.4–37.4)
MCV RBC AUTO: 99 FL (ref 82–98)
NRBC BLD AUTO-RTO: 0 /100 WBCS
PLATELET # BLD AUTO: 276 THOUSANDS/UL (ref 149–390)
PMV BLD AUTO: 11 FL (ref 8.9–12.7)
POTASSIUM SERPL-SCNC: 3.5 MMOL/L (ref 3.5–5.3)
PROT SERPL-MCNC: 6.3 G/DL (ref 6.4–8.2)
RBC # BLD AUTO: 3.83 MILLION/UL (ref 3.81–5.12)
SODIUM SERPL-SCNC: 137 MMOL/L (ref 136–145)
T4 FREE SERPL-MCNC: 1.27 NG/DL (ref 0.76–1.46)
TROPONIN I SERPL-MCNC: <0.02 NG/ML
TSH SERPL DL<=0.05 MIU/L-ACNC: 10.7 UIU/ML (ref 0.36–3.74)
VIT B12 SERPL-MCNC: 159 PG/ML (ref 100–900)
WBC # BLD AUTO: 8.63 THOUSAND/UL (ref 4.31–10.16)

## 2020-04-14 PROCEDURE — 82746 ASSAY OF FOLIC ACID SERUM: CPT | Performed by: STUDENT IN AN ORGANIZED HEALTH CARE EDUCATION/TRAINING PROGRAM

## 2020-04-14 PROCEDURE — 99285 EMERGENCY DEPT VISIT HI MDM: CPT

## 2020-04-14 PROCEDURE — 83690 ASSAY OF LIPASE: CPT | Performed by: STUDENT IN AN ORGANIZED HEALTH CARE EDUCATION/TRAINING PROGRAM

## 2020-04-14 PROCEDURE — 82607 VITAMIN B-12: CPT | Performed by: STUDENT IN AN ORGANIZED HEALTH CARE EDUCATION/TRAINING PROGRAM

## 2020-04-14 PROCEDURE — 85027 COMPLETE CBC AUTOMATED: CPT | Performed by: STUDENT IN AN ORGANIZED HEALTH CARE EDUCATION/TRAINING PROGRAM

## 2020-04-14 PROCEDURE — 71045 X-RAY EXAM CHEST 1 VIEW: CPT

## 2020-04-14 PROCEDURE — 99285 EMERGENCY DEPT VISIT HI MDM: CPT | Performed by: EMERGENCY MEDICINE

## 2020-04-14 PROCEDURE — 84443 ASSAY THYROID STIM HORMONE: CPT | Performed by: STUDENT IN AN ORGANIZED HEALTH CARE EDUCATION/TRAINING PROGRAM

## 2020-04-14 PROCEDURE — 36415 COLL VENOUS BLD VENIPUNCTURE: CPT | Performed by: STUDENT IN AN ORGANIZED HEALTH CARE EDUCATION/TRAINING PROGRAM

## 2020-04-14 PROCEDURE — 80053 COMPREHEN METABOLIC PANEL: CPT | Performed by: STUDENT IN AN ORGANIZED HEALTH CARE EDUCATION/TRAINING PROGRAM

## 2020-04-14 PROCEDURE — 99223 1ST HOSP IP/OBS HIGH 75: CPT | Performed by: INTERNAL MEDICINE

## 2020-04-14 PROCEDURE — 84439 ASSAY OF FREE THYROXINE: CPT | Performed by: STUDENT IN AN ORGANIZED HEALTH CARE EDUCATION/TRAINING PROGRAM

## 2020-04-14 PROCEDURE — 70450 CT HEAD/BRAIN W/O DYE: CPT

## 2020-04-14 PROCEDURE — 84484 ASSAY OF TROPONIN QUANT: CPT | Performed by: STUDENT IN AN ORGANIZED HEALTH CARE EDUCATION/TRAINING PROGRAM

## 2020-04-14 PROCEDURE — 82948 REAGENT STRIP/BLOOD GLUCOSE: CPT

## 2020-04-14 PROCEDURE — 93005 ELECTROCARDIOGRAM TRACING: CPT

## 2020-04-14 RX ORDER — MELATONIN
1000 DAILY
Status: DISCONTINUED | OUTPATIENT
Start: 2020-04-15 | End: 2020-04-16 | Stop reason: HOSPADM

## 2020-04-14 RX ORDER — ACETAMINOPHEN 325 MG/1
650 TABLET ORAL EVERY 6 HOURS PRN
Status: DISCONTINUED | OUTPATIENT
Start: 2020-04-14 | End: 2020-04-16 | Stop reason: HOSPADM

## 2020-04-14 RX ORDER — LEVOTHYROXINE SODIUM 88 UG/1
88 TABLET ORAL DAILY
Status: DISCONTINUED | OUTPATIENT
Start: 2020-04-15 | End: 2020-04-16 | Stop reason: HOSPADM

## 2020-04-14 RX ORDER — INSULIN GLARGINE 100 [IU]/ML
10 INJECTION, SOLUTION SUBCUTANEOUS
Status: DISCONTINUED | OUTPATIENT
Start: 2020-04-14 | End: 2020-04-16 | Stop reason: HOSPADM

## 2020-04-14 RX ORDER — DIVALPROEX SODIUM 250 MG/1
250 TABLET, DELAYED RELEASE ORAL EVERY 8 HOURS SCHEDULED
Status: DISCONTINUED | OUTPATIENT
Start: 2020-04-14 | End: 2020-04-14

## 2020-04-14 RX ORDER — DIVALPROEX SODIUM 125 MG/1
250 CAPSULE, COATED PELLETS ORAL EVERY 8 HOURS
Status: DISCONTINUED | OUTPATIENT
Start: 2020-04-15 | End: 2020-04-16 | Stop reason: HOSPADM

## 2020-04-14 RX ORDER — LISINOPRIL 10 MG/1
10 TABLET ORAL DAILY
Status: DISCONTINUED | OUTPATIENT
Start: 2020-04-15 | End: 2020-04-16 | Stop reason: HOSPADM

## 2020-04-14 RX ORDER — GUAIFENESIN 100 MG/5ML
200 SOLUTION ORAL 3 TIMES DAILY PRN
Status: DISCONTINUED | OUTPATIENT
Start: 2020-04-14 | End: 2020-04-16 | Stop reason: HOSPADM

## 2020-04-14 RX ORDER — AMLODIPINE BESYLATE 10 MG/1
10 TABLET ORAL DAILY
Status: DISCONTINUED | OUTPATIENT
Start: 2020-04-15 | End: 2020-04-16 | Stop reason: HOSPADM

## 2020-04-14 RX ORDER — PANTOPRAZOLE SODIUM 40 MG/1
40 TABLET, DELAYED RELEASE ORAL DAILY
Status: DISCONTINUED | OUTPATIENT
Start: 2020-04-15 | End: 2020-04-16 | Stop reason: HOSPADM

## 2020-04-14 RX ADMIN — INSULIN LISPRO 1 UNITS: 100 INJECTION, SOLUTION INTRAVENOUS; SUBCUTANEOUS at 23:42

## 2020-04-14 RX ADMIN — INSULIN GLARGINE 10 UNITS: 100 INJECTION, SOLUTION SUBCUTANEOUS at 23:41

## 2020-04-15 LAB
AMORPH PHOS CRY URNS QL MICRO: ABNORMAL /HPF
ATRIAL RATE: 78 BPM
BACTERIA UR QL AUTO: ABNORMAL /HPF
BILIRUB UR QL STRIP: NEGATIVE
CLARITY UR: ABNORMAL
COLOR UR: YELLOW
GLUCOSE SERPL-MCNC: 144 MG/DL (ref 65–140)
GLUCOSE SERPL-MCNC: 150 MG/DL (ref 65–140)
GLUCOSE SERPL-MCNC: 160 MG/DL (ref 65–140)
GLUCOSE SERPL-MCNC: 166 MG/DL (ref 65–140)
GLUCOSE SERPL-MCNC: 174 MG/DL (ref 65–140)
GLUCOSE UR STRIP-MCNC: NEGATIVE MG/DL
HGB UR QL STRIP.AUTO: NEGATIVE
KETONES UR STRIP-MCNC: ABNORMAL MG/DL
LEUKOCYTE ESTERASE UR QL STRIP: NEGATIVE
NITRITE UR QL STRIP: NEGATIVE
NON-SQ EPI CELLS URNS QL MICRO: ABNORMAL /HPF
OTHER STN SPEC: ABNORMAL
P AXIS: 81 DEGREES
PH UR STRIP.AUTO: 8.5 [PH]
PR INTERVAL: 152 MS
PROT UR STRIP-MCNC: NEGATIVE MG/DL
QRS AXIS: -48 DEGREES
QRSD INTERVAL: 102 MS
QT INTERVAL: 360 MS
QTC INTERVAL: 402 MS
RBC #/AREA URNS AUTO: ABNORMAL /HPF
SP GR UR STRIP.AUTO: 1.01 (ref 1–1.03)
T WAVE AXIS: 114 DEGREES
TRI-PHOS CRY URNS QL MICRO: ABNORMAL /HPF
UROBILINOGEN UR QL STRIP.AUTO: 1 E.U./DL
VENTRICULAR RATE: 75 BPM
WBC #/AREA URNS AUTO: ABNORMAL /HPF

## 2020-04-15 PROCEDURE — 97163 PT EVAL HIGH COMPLEX 45 MIN: CPT

## 2020-04-15 PROCEDURE — 93010 ELECTROCARDIOGRAM REPORT: CPT | Performed by: INTERNAL MEDICINE

## 2020-04-15 PROCEDURE — 82948 REAGENT STRIP/BLOOD GLUCOSE: CPT

## 2020-04-15 PROCEDURE — 97167 OT EVAL HIGH COMPLEX 60 MIN: CPT

## 2020-04-15 PROCEDURE — 81001 URINALYSIS AUTO W/SCOPE: CPT | Performed by: PHYSICIAN ASSISTANT

## 2020-04-15 PROCEDURE — 99232 SBSQ HOSP IP/OBS MODERATE 35: CPT | Performed by: PHYSICIAN ASSISTANT

## 2020-04-15 PROCEDURE — 99222 1ST HOSP IP/OBS MODERATE 55: CPT | Performed by: INTERNAL MEDICINE

## 2020-04-15 RX ADMIN — ENOXAPARIN SODIUM 40 MG: 40 INJECTION SUBCUTANEOUS at 08:13

## 2020-04-15 RX ADMIN — MELATONIN 1000 UNITS: at 08:12

## 2020-04-15 RX ADMIN — DIVALPROEX SODIUM 250 MG: 125 CAPSULE, COATED PELLETS ORAL at 00:34

## 2020-04-15 RX ADMIN — INSULIN LISPRO 1 UNITS: 100 INJECTION, SOLUTION INTRAVENOUS; SUBCUTANEOUS at 17:24

## 2020-04-15 RX ADMIN — DIVALPROEX SODIUM 250 MG: 125 CAPSULE, COATED PELLETS ORAL at 08:12

## 2020-04-15 RX ADMIN — DIVALPROEX SODIUM 250 MG: 125 CAPSULE, COATED PELLETS ORAL at 17:21

## 2020-04-15 RX ADMIN — INSULIN LISPRO 1 UNITS: 100 INJECTION, SOLUTION INTRAVENOUS; SUBCUTANEOUS at 12:09

## 2020-04-15 RX ADMIN — LISINOPRIL 10 MG: 10 TABLET ORAL at 08:12

## 2020-04-15 RX ADMIN — PANTOPRAZOLE SODIUM 40 MG: 40 TABLET, DELAYED RELEASE ORAL at 08:12

## 2020-04-15 RX ADMIN — INSULIN LISPRO 1 UNITS: 100 INJECTION, SOLUTION INTRAVENOUS; SUBCUTANEOUS at 08:12

## 2020-04-15 RX ADMIN — LEVOTHYROXINE SODIUM 88 MCG: 88 TABLET ORAL at 06:08

## 2020-04-15 RX ADMIN — AMLODIPINE BESYLATE 10 MG: 10 TABLET ORAL at 08:12

## 2020-04-15 RX ADMIN — INSULIN GLARGINE 10 UNITS: 100 INJECTION, SOLUTION SUBCUTANEOUS at 22:26

## 2020-04-16 VITALS
WEIGHT: 168.21 LBS | SYSTOLIC BLOOD PRESSURE: 128 MMHG | RESPIRATION RATE: 14 BRPM | OXYGEN SATURATION: 96 % | HEIGHT: 61 IN | DIASTOLIC BLOOD PRESSURE: 71 MMHG | TEMPERATURE: 98.4 F | HEART RATE: 72 BPM | BODY MASS INDEX: 31.76 KG/M2

## 2020-04-16 PROBLEM — R41.82 ALTERED MENTAL STATUS: Status: RESOLVED | Noted: 2020-04-14 | Resolved: 2020-04-16

## 2020-04-16 LAB
GLUCOSE SERPL-MCNC: 117 MG/DL (ref 65–140)
GLUCOSE SERPL-MCNC: 170 MG/DL (ref 65–140)
VALPROATE SERPL-MCNC: 56 UG/ML (ref 50–100)

## 2020-04-16 PROCEDURE — 80165 DIPROPYLACETIC ACID FREE: CPT | Performed by: PHYSICIAN ASSISTANT

## 2020-04-16 PROCEDURE — 80164 ASSAY DIPROPYLACETIC ACD TOT: CPT | Performed by: PHYSICIAN ASSISTANT

## 2020-04-16 PROCEDURE — 99232 SBSQ HOSP IP/OBS MODERATE 35: CPT | Performed by: INTERNAL MEDICINE

## 2020-04-16 PROCEDURE — NC001 PR NO CHARGE: Performed by: PHYSICIAN ASSISTANT

## 2020-04-16 PROCEDURE — 99239 HOSP IP/OBS DSCHRG MGMT >30: CPT | Performed by: PHYSICIAN ASSISTANT

## 2020-04-16 PROCEDURE — 82948 REAGENT STRIP/BLOOD GLUCOSE: CPT

## 2020-04-16 RX ORDER — LISINOPRIL 10 MG/1
10 TABLET ORAL DAILY
Refills: 0
Start: 2020-04-17 | End: 2022-07-26

## 2020-04-16 RX ORDER — INSULIN GLARGINE 100 [IU]/ML
10 INJECTION, SOLUTION SUBCUTANEOUS
Refills: 0
Start: 2020-04-16 | End: 2022-07-26

## 2020-04-16 RX ORDER — SENNOSIDES 8.6 MG
1 TABLET ORAL
Status: DISCONTINUED | OUTPATIENT
Start: 2020-04-16 | End: 2020-04-16 | Stop reason: HOSPADM

## 2020-04-16 RX ORDER — AMLODIPINE BESYLATE 10 MG/1
10 TABLET ORAL DAILY
Refills: 0
Start: 2020-04-17 | End: 2022-07-26

## 2020-04-16 RX ORDER — DIVALPROEX SODIUM 250 MG/1
250 TABLET, DELAYED RELEASE ORAL EVERY 12 HOURS SCHEDULED
Refills: 0
Start: 2020-04-16 | End: 2022-07-26

## 2020-04-16 RX ORDER — POLYETHYLENE GLYCOL 3350 17 G/17G
17 POWDER, FOR SOLUTION ORAL DAILY
Status: DISCONTINUED | OUTPATIENT
Start: 2020-04-16 | End: 2020-04-16 | Stop reason: HOSPADM

## 2020-04-16 RX ORDER — POLYETHYLENE GLYCOL 3350 17 G/17G
17 POWDER, FOR SOLUTION ORAL DAILY
Qty: 14 EACH | Refills: 0
Start: 2020-04-17

## 2020-04-16 RX ORDER — SENNOSIDES 8.6 MG
1 TABLET ORAL
Qty: 120 EACH | Refills: 0
Start: 2020-04-16

## 2020-04-16 RX ADMIN — MELATONIN 1000 UNITS: at 09:04

## 2020-04-16 RX ADMIN — LISINOPRIL 10 MG: 10 TABLET ORAL at 09:04

## 2020-04-16 RX ADMIN — POLYETHYLENE GLYCOL 3350 17 G: 17 POWDER, FOR SOLUTION ORAL at 12:02

## 2020-04-16 RX ADMIN — INSULIN LISPRO 1 UNITS: 100 INJECTION, SOLUTION INTRAVENOUS; SUBCUTANEOUS at 12:06

## 2020-04-16 RX ADMIN — LEVOTHYROXINE SODIUM 88 MCG: 88 TABLET ORAL at 06:19

## 2020-04-16 RX ADMIN — ENOXAPARIN SODIUM 40 MG: 40 INJECTION SUBCUTANEOUS at 08:53

## 2020-04-16 RX ADMIN — DIVALPROEX SODIUM 250 MG: 125 CAPSULE, COATED PELLETS ORAL at 00:50

## 2020-04-16 RX ADMIN — DIVALPROEX SODIUM 250 MG: 125 CAPSULE, COATED PELLETS ORAL at 09:04

## 2020-04-16 RX ADMIN — PANTOPRAZOLE SODIUM 40 MG: 40 TABLET, DELAYED RELEASE ORAL at 08:54

## 2020-04-16 RX ADMIN — AMLODIPINE BESYLATE 10 MG: 10 TABLET ORAL at 09:04

## 2020-04-20 LAB — VALPROATE FREE SERPL-MCNC: 23 UG/ML (ref 6–22)

## 2020-04-28 ENCOUNTER — NURSING HOME VISIT (OUTPATIENT)
Dept: GERIATRICS | Facility: OTHER | Age: 85
End: 2020-04-28
Payer: COMMERCIAL

## 2020-04-28 DIAGNOSIS — G30.1 LATE ONSET ALZHEIMER'S DISEASE WITHOUT BEHAVIORAL DISTURBANCE (HCC): ICD-10-CM

## 2020-04-28 DIAGNOSIS — E06.3 HYPOTHYROIDISM DUE TO HASHIMOTO'S THYROIDITIS: ICD-10-CM

## 2020-04-28 DIAGNOSIS — I10 ESSENTIAL HYPERTENSION: ICD-10-CM

## 2020-04-28 DIAGNOSIS — F02.80 LATE ONSET ALZHEIMER'S DISEASE WITHOUT BEHAVIORAL DISTURBANCE (HCC): ICD-10-CM

## 2020-04-28 DIAGNOSIS — R26.2 AMBULATORY DYSFUNCTION: Primary | ICD-10-CM

## 2020-04-28 DIAGNOSIS — E03.8 HYPOTHYROIDISM DUE TO HASHIMOTO'S THYROIDITIS: ICD-10-CM

## 2020-04-28 DIAGNOSIS — E11.9 TYPE 2 DIABETES MELLITUS WITHOUT COMPLICATION, WITHOUT LONG-TERM CURRENT USE OF INSULIN (HCC): ICD-10-CM

## 2020-04-28 PROCEDURE — 99305 1ST NF CARE MODERATE MDM 35: CPT | Performed by: FAMILY MEDICINE

## 2020-05-21 ENCOUNTER — NURSING HOME VISIT (OUTPATIENT)
Dept: GERIATRICS | Facility: OTHER | Age: 85
End: 2020-05-21
Payer: COMMERCIAL

## 2020-05-21 DIAGNOSIS — F02.80 LATE ONSET ALZHEIMER'S DISEASE WITHOUT BEHAVIORAL DISTURBANCE (HCC): Primary | ICD-10-CM

## 2020-05-21 DIAGNOSIS — E06.3 HYPOTHYROIDISM DUE TO HASHIMOTO'S THYROIDITIS: ICD-10-CM

## 2020-05-21 DIAGNOSIS — I10 ESSENTIAL HYPERTENSION: ICD-10-CM

## 2020-05-21 DIAGNOSIS — E03.8 HYPOTHYROIDISM DUE TO HASHIMOTO'S THYROIDITIS: ICD-10-CM

## 2020-05-21 DIAGNOSIS — G30.1 LATE ONSET ALZHEIMER'S DISEASE WITHOUT BEHAVIORAL DISTURBANCE (HCC): Primary | ICD-10-CM

## 2020-05-21 DIAGNOSIS — E11.9 TYPE 2 DIABETES MELLITUS WITHOUT COMPLICATION, WITHOUT LONG-TERM CURRENT USE OF INSULIN (HCC): ICD-10-CM

## 2020-05-21 PROBLEM — R91.8 GROUND GLASS OPACITY PRESENT ON IMAGING OF LUNG: Status: RESOLVED | Noted: 2020-04-08 | Resolved: 2020-05-21

## 2020-05-21 PROCEDURE — 99309 SBSQ NF CARE MODERATE MDM 30: CPT | Performed by: FAMILY MEDICINE

## 2020-06-18 ENCOUNTER — NURSING HOME VISIT (OUTPATIENT)
Dept: GERIATRICS | Facility: OTHER | Age: 85
End: 2020-06-18
Payer: COMMERCIAL

## 2020-06-18 DIAGNOSIS — E06.3 HYPOTHYROIDISM DUE TO HASHIMOTO'S THYROIDITIS: ICD-10-CM

## 2020-06-18 DIAGNOSIS — E03.8 HYPOTHYROIDISM DUE TO HASHIMOTO'S THYROIDITIS: ICD-10-CM

## 2020-06-18 DIAGNOSIS — I10 ESSENTIAL HYPERTENSION: ICD-10-CM

## 2020-06-18 DIAGNOSIS — R26.2 AMBULATORY DYSFUNCTION: ICD-10-CM

## 2020-06-18 DIAGNOSIS — G30.1 LATE ONSET ALZHEIMER'S DISEASE WITHOUT BEHAVIORAL DISTURBANCE (HCC): Primary | ICD-10-CM

## 2020-06-18 DIAGNOSIS — K59.00 CONSTIPATION, UNSPECIFIED CONSTIPATION TYPE: ICD-10-CM

## 2020-06-18 DIAGNOSIS — E11.9 TYPE 2 DIABETES MELLITUS WITHOUT COMPLICATION, WITHOUT LONG-TERM CURRENT USE OF INSULIN (HCC): ICD-10-CM

## 2020-06-18 DIAGNOSIS — F02.80 LATE ONSET ALZHEIMER'S DISEASE WITHOUT BEHAVIORAL DISTURBANCE (HCC): Primary | ICD-10-CM

## 2020-06-18 PROCEDURE — 99309 SBSQ NF CARE MODERATE MDM 30: CPT | Performed by: FAMILY MEDICINE

## 2020-07-21 ENCOUNTER — NURSING HOME VISIT (OUTPATIENT)
Dept: GERIATRICS | Facility: OTHER | Age: 85
End: 2020-07-21
Payer: COMMERCIAL

## 2020-07-21 DIAGNOSIS — E06.3 HYPOTHYROIDISM DUE TO HASHIMOTO'S THYROIDITIS: ICD-10-CM

## 2020-07-21 DIAGNOSIS — I10 ESSENTIAL HYPERTENSION: ICD-10-CM

## 2020-07-21 DIAGNOSIS — E03.8 HYPOTHYROIDISM DUE TO HASHIMOTO'S THYROIDITIS: ICD-10-CM

## 2020-07-21 DIAGNOSIS — G30.1 LATE ONSET ALZHEIMER'S DISEASE WITHOUT BEHAVIORAL DISTURBANCE (HCC): ICD-10-CM

## 2020-07-21 DIAGNOSIS — F02.80 LATE ONSET ALZHEIMER'S DISEASE WITHOUT BEHAVIORAL DISTURBANCE (HCC): ICD-10-CM

## 2020-07-21 DIAGNOSIS — E11.9 TYPE 2 DIABETES MELLITUS WITHOUT COMPLICATION, WITHOUT LONG-TERM CURRENT USE OF INSULIN (HCC): Primary | ICD-10-CM

## 2020-07-21 PROCEDURE — 99309 SBSQ NF CARE MODERATE MDM 30: CPT | Performed by: FAMILY MEDICINE

## 2020-07-22 NOTE — PROGRESS NOTES
[facility]  [address]  [phone]  Facility: [address]/          NAME: Lora Dumont  AGE: 80 y o  SEX: female    DATE OF ENCOUNTER: 7/21/2020    Chief Complaint     Pt has no complaint     History of Present Illness     HPI    The following portions of the patient's history were reviewed and updated as appropriate (from facility chart and hospital records): allergies, current medications, past family history, past medical history, past social history, past surgical history and problem list   Pt was seen and examined for f/u on Dementia, DM, Hypothyroidism, HTN  Pt's wt, BP, BS have been stable, walks with using a walker with staff's help  Per staff sometimes is impulsive and starts walking by her self not using any devices  Overall and otherwise has been stable, and most of movements happens with using her WC  Continues with NH Care  Review of Systems     Review of Systems   Unable to perform ROS: Dementia (limited with pt's dementia but overall denies any problem or complaint")   Constitutional: Negative  HENT: Negative  Eyes: Negative  Respiratory: Negative  Cardiovascular: Negative  Gastrointestinal: Negative  Endocrine: Negative  Genitourinary: Negative  Musculoskeletal: Negative  Skin: Negative  Allergic/Immunologic: Negative  Neurological: Negative  Hematological: Negative  Psychiatric/Behavioral: Negative  All other systems reviewed and are negative        Active Problem List     Patient Active Problem List   Diagnosis    Type 2 diabetes mellitus without complication, without long-term current use of insulin ([address] )    Mixed hyperlipidemia    Late onset Alzheimer's disease without behavioral disturbance ([address] )    Essential hypertension    Hypothyroidism due to Hashimoto's thyroiditis    Constipation    Failure to thrive in adult    Ambulatory dysfunction       Objective     Vitals: wt:152 8Ibs BP:132/64         Afebrile     Physical Exam   Constitutional: She appears well-developed and well-nourished  No distress  HENT:   Head: Normocephalic and atraumatic  Right Ear: External ear normal    Left Ear: External ear normal    Nose: Nose normal    Mouth/Throat: Oropharynx is clear and moist  No oropharyngeal exudate  [place]   Eyes: Pupils are equal, round, and reactive to light  Conjunctivae and EOM are normal  Right eye exhibits no discharge  Left eye exhibits no discharge  No scleral icterus  Neck: Normal range of motion  Neck supple  Cardiovascular: Normal rate, regular rhythm and normal heart sounds  Exam reveals no gallop and no friction rub  No murmur heard  Pulmonary/Chest: Effort normal and breath sounds normal  No stridor  No respiratory distress  She has no wheezes  She has no rales  She exhibits no tenderness  Abdominal: Soft  Bowel sounds are normal  She exhibits no distension and no mass  There is no tenderness  There is no rebound and no guarding  No hernia  Genitourinary:   Genitourinary Comments: Deferred  Musculoskeletal: Normal range of motion  She exhibits edema (+Edema of LES )  She exhibits no tenderness or deformity  Lymphadenopathy:     She has no cervical adenopathy  Neurological: A cranial nerve deficit ([place]) is present  Not oriented, follows simple commands  Skin: Skin is warm and dry  No rash noted  She is not diaphoretic  No erythema  No pallor  Didn't examine sacral area  Psychiatric: She has a normal mood and affect  Her behavior is normal    Nursing note and vitals reviewed  Pertinent Laboratory/Diagnostic Studies:  TSH on 5/28 WNL, CBC, BMP, Valproic Acid,HbA1C: 7 1, folate done in April     Current Medications   Medication list in facility chart was reviewed and no changes made         Assessment and Plan     Type 2 diabetes mellitus without complication, without long-term current use of insulin ([facility] )    Lab Results   Component Value Date HGBA1C 7 1 (H) 04/08/2020   stable, will continue with Glargine, stable  Essential hypertension  BP has been stable with Lisinopril, and Amlodipine  will continue with monitoring  Late onset 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

## 2020-07-22 NOTE — ASSESSMENT & PLAN NOTE
Lab Results   Component Value Date    HGBA1C 7 1 (H) 04/08/2020   stable, will continue with Glargine, stable

## 2020-08-13 RX ORDER — FOLIC ACID 1 MG/1
1 TABLET ORAL DAILY
COMMUNITY

## 2020-08-13 RX ORDER — ACETAMINOPHEN 325 MG/1
650 TABLET ORAL EVERY 6 HOURS PRN
COMMUNITY

## 2020-08-13 RX ORDER — CHOLECALCIFEROL (VITAMIN D3) 125 MCG
3000 CAPSULE ORAL
COMMUNITY

## 2020-08-13 RX ORDER — LANOLIN ALCOHOL/MO/W.PET/CERES
CREAM (GRAM) TOPICAL DAILY
COMMUNITY

## 2020-08-18 ENCOUNTER — NURSING HOME VISIT (OUTPATIENT)
Dept: GERIATRICS | Facility: OTHER | Age: 85
End: 2020-08-18
Payer: COMMERCIAL

## 2020-08-18 DIAGNOSIS — E11.9 TYPE 2 DIABETES MELLITUS WITHOUT COMPLICATION, WITHOUT LONG-TERM CURRENT USE OF INSULIN (HCC): ICD-10-CM

## 2020-08-18 DIAGNOSIS — G30.1 LATE ONSET ALZHEIMER'S DISEASE WITHOUT BEHAVIORAL DISTURBANCE (HCC): Primary | ICD-10-CM

## 2020-08-18 DIAGNOSIS — I10 ESSENTIAL HYPERTENSION: ICD-10-CM

## 2020-08-18 DIAGNOSIS — E06.3 HYPOTHYROIDISM DUE TO HASHIMOTO'S THYROIDITIS: ICD-10-CM

## 2020-08-18 DIAGNOSIS — E03.8 HYPOTHYROIDISM DUE TO HASHIMOTO'S THYROIDITIS: ICD-10-CM

## 2020-08-18 DIAGNOSIS — F02.80 LATE ONSET ALZHEIMER'S DISEASE WITHOUT BEHAVIORAL DISTURBANCE (HCC): Primary | ICD-10-CM

## 2020-08-18 PROCEDURE — 99309 SBSQ NF CARE MODERATE MDM 30: CPT | Performed by: FAMILY MEDICINE

## 2020-08-18 NOTE — ASSESSMENT & PLAN NOTE
Lab Results   Component Value Date    HGBA1C 7 1 (H) 04/08/2020   will check HbA1C this month  BS stable, will continue with Lantus and monitoring

## 2020-08-18 NOTE — PROGRESS NOTES
[facility]  [address]  [phone]  Facility: [facility]/          NAME: Wes Peterson  AGE: 80 y o  SEX: female    DATE OF ENCOUNTER: 8/18/2020    Chief Complaint     Pt has no complaint     History of Present Illness     HPI    The following portions of the patient's history were reviewed and updated as appropriate (from facility chart and hospital records): allergies, current medications, past family history, past medical history, past social history, past surgical history and problem list   Pt was seen and examined for f/u on dementia, HTN, DM, hypothyroidism  Pt continues with NH care, moves using her WC  BS, BP, wt have been stable  No specific issues or concerns  Overall has been stable  Pt has no complaint  Review of Systems     Review of Systems   Unable to perform ROS: Dementia (limited but pt denies any pain  )       Active Problem List     Patient Active Problem List   Diagnosis    Type 2 diabetes mellitus without complication, without long-term current use of insulin ([address] )    Mixed hyperlipidemia    Late onset Alzheimer's disease without behavioral disturbance ([facility] [facility] )    Essential hypertension    Hypothyroidism due to Hashimoto's thyroiditis    Constipation    Failure to thrive in adult    Ambulatory dysfunction       Objective     Vitals: wt:152 8Ibs             BP:124/50           Afebrile              BS log was reviewed     Physical Exam  Vitals signs and nursing note reviewed  Constitutional:       General: She is not in acute distress  Appearance: Normal appearance  She is well-developed  She is not ill-appearing, toxic-appearing or diaphoretic  HENT:      Head: Normocephalic and atraumatic  Right Ear: External ear normal  There is no impacted cerumen  Left Ear: External ear normal  There is no impacted cerumen  Nose: Nose normal  No congestion or rhinorrhea        Mouth/Throat:      Pharynx: No oropharyngeal exudate  Eyes:      General: No scleral icterus  Right eye: No discharge  Left eye: No discharge  Conjunctiva/sclera: Conjunctivae normal       Pupils: Pupils are equal, round, and reactive to light  Neck:      Musculoskeletal: Normal range of motion and neck supple  Cardiovascular:      Rate and Rhythm: Normal rate and regular rhythm  Heart sounds: Normal heart sounds  No murmur  No friction rub  No gallop  Pulmonary:      Effort: Pulmonary effort is normal  No respiratory distress  Breath sounds: Normal breath sounds  No stridor  No wheezing, rhonchi or rales  Chest:      Chest wall: No tenderness  Abdominal:      General: Bowel sounds are normal  There is no distension  Palpations: Abdomen is soft  There is no mass  Tenderness: There is no abdominal tenderness  There is no guarding or rebound  Hernia: No hernia is present  Genitourinary:     Comments: Deferred  Musculoskeletal:         General: Swelling present  No tenderness or deformity  Right lower leg: Edema (+1) present  Left lower leg: Edema (+1) present  Comments: In bed, sitting with legs down  Limited ROM   Lymphadenopathy:      Cervical: No cervical adenopathy  Skin:     General: Skin is warm and dry  Coloration: Skin is not jaundiced or pale  Findings: No bruising, erythema, lesion or rash  Comments: Didn't examine sacral area  Neurological:      Mental Status: She is alert  Cranial Nerves: Cranial nerve deficit present  Comments: [place], follows simple commands, not oriented    Psychiatric:         Behavior: Behavior normal          Pertinent Laboratory/Diagnostic Studies:  TSH in May, BMP, CBC, HbA1c:7 1  in April     Current Medications   Medication list in facility chart was reviewed and no changes made  Assessment and Plan   Late onset Alzheimer's disease without behavioral disturbance (HCC)  Behaviors stable, on Divalproex   Will continue with monitoring  Essential hypertension  On Amlodipine, and Lisinopril  will continue with monitoring, last lab in June, stable  Hypothyroidism due to Hashimoto's thyroiditis  Will have repeat TSH this week  On Levothyroxine, will continue with monitoring  Type 2 diabetes mellitus without complication, without long-term current use of insulin ([facility])    Lab Results   Component Value Date    HGBA1C 7 1 (H) 04/08/2020   will check HbA1C this month  BS stable, will continue with Lantus and monitoring           Mendoza Bond MD  [id number]/78/[id number] PM

## 2020-09-17 ENCOUNTER — NURSING HOME VISIT (OUTPATIENT)
Dept: GERIATRICS | Facility: OTHER | Age: 85
End: 2020-09-17
Payer: COMMERCIAL

## 2020-09-17 DIAGNOSIS — F02.80 LATE ONSET ALZHEIMER'S DISEASE WITHOUT BEHAVIORAL DISTURBANCE (HCC): Primary | ICD-10-CM

## 2020-09-17 DIAGNOSIS — K21.9 GASTROESOPHAGEAL REFLUX DISEASE WITHOUT ESOPHAGITIS: ICD-10-CM

## 2020-09-17 DIAGNOSIS — E06.3 HYPOTHYROIDISM DUE TO HASHIMOTO'S THYROIDITIS: ICD-10-CM

## 2020-09-17 DIAGNOSIS — E03.8 HYPOTHYROIDISM DUE TO HASHIMOTO'S THYROIDITIS: ICD-10-CM

## 2020-09-17 DIAGNOSIS — I10 ESSENTIAL HYPERTENSION: ICD-10-CM

## 2020-09-17 DIAGNOSIS — E11.9 TYPE 2 DIABETES MELLITUS WITHOUT COMPLICATION, WITHOUT LONG-TERM CURRENT USE OF INSULIN (HCC): ICD-10-CM

## 2020-09-17 DIAGNOSIS — G30.1 LATE ONSET ALZHEIMER'S DISEASE WITHOUT BEHAVIORAL DISTURBANCE (HCC): Primary | ICD-10-CM

## 2020-09-17 PROCEDURE — 99309 SBSQ NF CARE MODERATE MDM 30: CPT | Performed by: FAMILY MEDICINE

## 2020-09-18 NOTE — PROGRESS NOTES
[facility]  [address]  [phone]  Facility: [address]/          NAME: Glory Smith  AGE: 80 y o  SEX: female    DATE OF ENCOUNTER: 9/17/2020    Chief Complaint     Pt has no complaint     History of Present Illness     HPI    The following portions of the patient's history were reviewed and updated as appropriate (from facility chart and hospital records): allergies, current medications, past family history, past medical history, past social history, past surgical history and problem list   Pt was seen and examined for f/u on Dementia, Hypothyroidism, DM, HTN  Pt continues with NH care  Stable at her base  Per staff at times is impulsive with walking and not using her walker  WT BP, BS stable  No other specific issues or concerns  Review of Systems     Review of Systems   Unable to perform ROS: Dementia (limited)   Constitutional: Negative  HENT: Negative  Eyes: Negative  Respiratory: Negative  Cardiovascular: Negative  Gastrointestinal: Negative  Endocrine: Negative  Genitourinary: Negative  Musculoskeletal: Negative  Skin: Negative  Allergic/Immunologic: Negative  Neurological: Negative  Hematological: Negative  Psychiatric/Behavioral: Negative  All other systems reviewed and are negative  Active Problem List     Patient Active Problem List   Diagnosis    Type 2 diabetes mellitus without complication, without long-term current use of insulin ([address] )    Mixed hyperlipidemia    Late onset Alzheimer's disease without behavioral disturbance ([address] )    Essential hypertension    Hypothyroidism due to Hashimoto's thyroiditis    Constipation    Failure to thrive in adult    Ambulatory dysfunction    Gastroesophageal reflux disease without esophagitis       Objective     Vitals: wt:155Ibs            BP:116/72      BS log was reviewed   Afebrile            Physical Exam  Vitals signs and nursing note reviewed  Constitutional:       General: She is not in acute distress  Appearance: Normal appearance  She is well-developed  She is not ill-appearing, toxic-appearing or diaphoretic  HENT:      Head: Normocephalic and atraumatic  Right Ear: External ear normal       Left Ear: External ear normal       Ears:      Comments: [place]     Nose: Nose normal  No congestion or rhinorrhea  Mouth/Throat:      Pharynx: No oropharyngeal exudate or posterior oropharyngeal erythema  Eyes:      General: No scleral icterus  Right eye: No discharge  Left eye: No discharge  Conjunctiva/sclera: Conjunctivae normal       Pupils: Pupils are equal, round, and reactive to light  Neck:      Musculoskeletal: Normal range of motion and neck supple  No neck rigidity or muscular tenderness  Cardiovascular:      Rate and Rhythm: Normal rate and regular rhythm  Heart sounds: Normal heart sounds  No murmur  No friction rub  No gallop  Pulmonary:      Effort: Pulmonary effort is normal  No respiratory distress  Breath sounds: Normal breath sounds  No stridor  No wheezing, rhonchi or rales  Chest:      Chest wall: No tenderness  Abdominal:      General: Bowel sounds are normal  There is no distension  Palpations: Abdomen is soft  There is no mass  Tenderness: There is no abdominal tenderness  There is no guarding or rebound  Hernia: No hernia is present  Genitourinary:     Comments: Deferred  Musculoskeletal:         General: No swelling, tenderness, deformity or signs of injury  Right lower leg: Edema present  Left lower leg: Edema present  Comments: In WC, sitting,moves all extremities  Lymphadenopathy:      Cervical: No cervical adenopathy  Skin:     General: Skin is warm and dry  Coloration: Skin is not jaundiced or pale  Findings: Erythema present  No bruising, lesion or rash  Comments: Didn't examine sacral area      Neurological:      Mental Status: She is alert  Cranial Nerves: Cranial nerve deficit ([place]) present  Psychiatric:         Behavior: Behavior normal          Pertinent Laboratory/Diagnostic Studies: In August:HbA1C:6 3, TSH, In April:folate, CBC, CMP    Current Medications   Medication list in facility chart was reviewed and no  changes made  Assessment and Plan   Late onset Alzheimer's disease without behavioral disturbance ([facility] )  Continues with NH care  Wt stable  Overall stable  Essential hypertension  BP stable with Lisinopril and Amlodipine  Hypothyroidism due to Hashimoto's thyroiditis  Stable with Levothyroxine  Last TSH done in August      Type 2 diabetes mellitus without complication, without long-term current use of insulin (HCC)    Recent HbA1C in August 6 3, stable with Lantus  Will continue with monitoring  Gastroesophageal reflux disease without esophagitis  On omeprazole  Will consider to change to prn if still asymptomatic in next month         Sofia May MD  3/51/698715:12 PM

## 2020-10-15 ENCOUNTER — NURSING HOME VISIT (OUTPATIENT)
Dept: GERIATRICS | Facility: OTHER | Age: 85
End: 2020-10-15
Payer: COMMERCIAL

## 2020-10-15 DIAGNOSIS — G30.1 LATE ONSET ALZHEIMER'S DISEASE WITHOUT BEHAVIORAL DISTURBANCE (HCC): ICD-10-CM

## 2020-10-15 DIAGNOSIS — E03.8 HYPOTHYROIDISM DUE TO HASHIMOTO'S THYROIDITIS: ICD-10-CM

## 2020-10-15 DIAGNOSIS — E06.3 HYPOTHYROIDISM DUE TO HASHIMOTO'S THYROIDITIS: ICD-10-CM

## 2020-10-15 DIAGNOSIS — F02.80 LATE ONSET ALZHEIMER'S DISEASE WITHOUT BEHAVIORAL DISTURBANCE (HCC): ICD-10-CM

## 2020-10-15 DIAGNOSIS — K21.9 GASTROESOPHAGEAL REFLUX DISEASE WITHOUT ESOPHAGITIS: Primary | ICD-10-CM

## 2020-10-15 DIAGNOSIS — E11.9 TYPE 2 DIABETES MELLITUS WITHOUT COMPLICATION, WITHOUT LONG-TERM CURRENT USE OF INSULIN (HCC): ICD-10-CM

## 2020-10-15 DIAGNOSIS — I10 ESSENTIAL HYPERTENSION: ICD-10-CM

## 2020-10-15 PROCEDURE — 99309 SBSQ NF CARE MODERATE MDM 30: CPT | Performed by: FAMILY MEDICINE

## 2020-10-16 RX ORDER — OMEPRAZOLE 20 MG/1
20 CAPSULE, DELAYED RELEASE ORAL DAILY PRN
COMMUNITY
End: 2022-03-22

## 2020-11-24 ENCOUNTER — TELEMEDICINE (OUTPATIENT)
Dept: GERIATRICS | Facility: OTHER | Age: 85
End: 2020-11-24
Payer: COMMERCIAL

## 2020-11-24 DIAGNOSIS — U07.1 COVID-19: Primary | ICD-10-CM

## 2020-11-24 DIAGNOSIS — I10 ESSENTIAL HYPERTENSION: ICD-10-CM

## 2020-11-24 DIAGNOSIS — F02.80 LATE ONSET ALZHEIMER'S DISEASE WITHOUT BEHAVIORAL DISTURBANCE (HCC): ICD-10-CM

## 2020-11-24 DIAGNOSIS — E11.9 TYPE 2 DIABETES MELLITUS WITHOUT COMPLICATION, WITHOUT LONG-TERM CURRENT USE OF INSULIN (HCC): ICD-10-CM

## 2020-11-24 DIAGNOSIS — K21.9 GASTROESOPHAGEAL REFLUX DISEASE WITHOUT ESOPHAGITIS: ICD-10-CM

## 2020-11-24 DIAGNOSIS — G30.1 LATE ONSET ALZHEIMER'S DISEASE WITHOUT BEHAVIORAL DISTURBANCE (HCC): ICD-10-CM

## 2020-11-24 PROCEDURE — 99309 SBSQ NF CARE MODERATE MDM 30: CPT | Performed by: FAMILY MEDICINE

## 2020-12-01 ENCOUNTER — TELEMEDICINE (OUTPATIENT)
Dept: GERIATRICS | Facility: OTHER | Age: 85
End: 2020-12-01
Payer: COMMERCIAL

## 2020-12-01 DIAGNOSIS — U07.1 COVID-19: Primary | ICD-10-CM

## 2020-12-01 PROCEDURE — 99308 SBSQ NF CARE LOW MDM 20: CPT | Performed by: FAMILY MEDICINE

## 2020-12-06 ENCOUNTER — TELEMEDICINE (OUTPATIENT)
Dept: GERIATRICS | Facility: OTHER | Age: 85
End: 2020-12-06
Payer: MEDICARE

## 2020-12-06 DIAGNOSIS — R79.81 LOW O2 SATURATION: Primary | ICD-10-CM

## 2020-12-06 PROCEDURE — 99308 SBSQ NF CARE LOW MDM 20: CPT | Performed by: FAMILY MEDICINE

## 2020-12-17 ENCOUNTER — TELEMEDICINE (OUTPATIENT)
Dept: GERIATRICS | Facility: OTHER | Age: 85
End: 2020-12-17
Payer: COMMERCIAL

## 2020-12-17 DIAGNOSIS — E03.8 HYPOTHYROIDISM DUE TO HASHIMOTO'S THYROIDITIS: ICD-10-CM

## 2020-12-17 DIAGNOSIS — E06.3 HYPOTHYROIDISM DUE TO HASHIMOTO'S THYROIDITIS: ICD-10-CM

## 2020-12-17 DIAGNOSIS — G30.1 LATE ONSET ALZHEIMER'S DISEASE WITHOUT BEHAVIORAL DISTURBANCE (HCC): Primary | ICD-10-CM

## 2020-12-17 DIAGNOSIS — E11.9 TYPE 2 DIABETES MELLITUS WITHOUT COMPLICATION, WITHOUT LONG-TERM CURRENT USE OF INSULIN (HCC): ICD-10-CM

## 2020-12-17 DIAGNOSIS — F02.80 LATE ONSET ALZHEIMER'S DISEASE WITHOUT BEHAVIORAL DISTURBANCE (HCC): Primary | ICD-10-CM

## 2020-12-17 DIAGNOSIS — I10 ESSENTIAL HYPERTENSION: ICD-10-CM

## 2020-12-17 PROBLEM — U07.1 COVID-19: Status: RESOLVED | Noted: 2020-11-24 | Resolved: 2020-12-17

## 2020-12-17 PROCEDURE — 99309 SBSQ NF CARE MODERATE MDM 30: CPT | Performed by: FAMILY MEDICINE

## 2021-01-06 ENCOUNTER — TELEPHONE (OUTPATIENT)
Dept: OTHER | Facility: OTHER | Age: 86
End: 2021-01-06

## 2021-01-07 NOTE — TELEPHONE ENCOUNTER
([place]  2 of 5) [facility] [phone] [address] w/ [address] [facility] [id number]   Re: Order change request

## 2021-01-28 ENCOUNTER — NURSING HOME VISIT (OUTPATIENT)
Dept: GERIATRICS | Facility: OTHER | Age: 86
End: 2021-01-28
Payer: COMMERCIAL

## 2021-01-28 DIAGNOSIS — I10 ESSENTIAL HYPERTENSION: ICD-10-CM

## 2021-01-28 DIAGNOSIS — E06.3 HYPOTHYROIDISM DUE TO HASHIMOTO'S THYROIDITIS: ICD-10-CM

## 2021-01-28 DIAGNOSIS — E11.9 TYPE 2 DIABETES MELLITUS WITHOUT COMPLICATION, WITHOUT LONG-TERM CURRENT USE OF INSULIN (HCC): Primary | ICD-10-CM

## 2021-01-28 DIAGNOSIS — E03.8 HYPOTHYROIDISM DUE TO HASHIMOTO'S THYROIDITIS: ICD-10-CM

## 2021-01-28 DIAGNOSIS — G30.1 LATE ONSET ALZHEIMER'S DISEASE WITHOUT BEHAVIORAL DISTURBANCE (HCC): ICD-10-CM

## 2021-01-28 DIAGNOSIS — F02.80 LATE ONSET ALZHEIMER'S DISEASE WITHOUT BEHAVIORAL DISTURBANCE (HCC): ICD-10-CM

## 2021-01-28 PROCEDURE — 99309 SBSQ NF CARE MODERATE MDM 30: CPT | Performed by: FAMILY MEDICINE

## 2021-01-29 NOTE — PROGRESS NOTES
[facility]  [address]  [phone]  Facility: [address]/          NAME: Anne-Marie Thomas  AGE: 80 y o  SEX: female    DATE OF ENCOUNTER: 1/28/2021    Chief Complaint     Pt has no complaint     History of Present Illness     HPI    The following portions of the patient's history were reviewed and updated as appropriate (from facility chart and hospital records): allergies, current medications, past family history, past medical history, past social history, past surgical history and problem list   Pt was seen and examined for f/u on HTN, Dementia, DM, Hypothyroidism, no behaviors  Pt continues with NH care  Stable wt, BP and BS  Last lab done on 1/14/2021  No specific issues or concerns  No Behaviors  Review of Systems     Review of Systems   Unable to perform ROS: Dementia (denies any pain )       Active Problem List     Patient Active Problem List   Diagnosis    Type 2 diabetes mellitus without complication, without long-term current use of insulin ([facility] )    Mixed hyperlipidemia    Late onset Alzheimer's disease without behavioral disturbance ([facility] )    Essential hypertension    Hypothyroidism due to Hashimoto's thyroiditis    Constipation    Failure to thrive in adult    Ambulatory dysfunction    Gastroesophageal reflux disease without esophagitis    Low O2 saturation       Objective     Vitals:wt:152Ibs          BP:126/74            Afebrile          BS log reviewed   NC:76    Physical Exam  Vitals signs and nursing note reviewed  Constitutional:       General: She is not in acute distress  Appearance: Normal appearance  She is well-developed  She is not ill-appearing, toxic-appearing or diaphoretic  HENT:      Head: Normocephalic and atraumatic  Right Ear: External ear normal       Left Ear: External ear normal       Ears:      Comments: [place]     Nose: Nose normal  No congestion or rhinorrhea  Mouth/Throat:      Comments: Dentures  Eyes:      General: No scleral icterus  Right eye: No discharge  Left eye: No discharge  Conjunctiva/sclera: Conjunctivae normal       Pupils: Pupils are equal, round, and reactive to light  Neck:      Musculoskeletal: Normal range of motion and neck supple  No neck rigidity or muscular tenderness  Cardiovascular:      Rate and Rhythm: Normal rate and regular rhythm  Heart sounds: Normal heart sounds  No murmur  No friction rub  No gallop  Pulmonary:      Effort: Pulmonary effort is normal  No respiratory distress  Breath sounds: Normal breath sounds  No stridor  No wheezing, rhonchi or rales  Chest:      Chest wall: No tenderness  Abdominal:      General: Bowel sounds are normal  There is no distension  Palpations: Abdomen is soft  There is no mass  Tenderness: There is no abdominal tenderness  There is no guarding or rebound  Hernia: No hernia is present  Genitourinary:     Comments: Deferred  Musculoskeletal:         General: No swelling, tenderness, deformity or signs of injury  Right lower leg: Edema present  Left lower leg: Edema present  Lymphadenopathy:      Cervical: No cervical adenopathy  Skin:     General: Skin is warm and dry  Coloration: Skin is not jaundiced or pale  Findings: No bruising, erythema, lesion or rash  Comments: Didn't examine sacral area  Neurological:      Mental Status: She is alert  Cranial Nerves: Cranial nerve deficit ([place]) present  Comments: Forgetful  Follows commands  Not oriented  Psychiatric:         Behavior: Behavior normal          Pertinent Laboratory/Diagnostic Studies:  1/14/2021:HbA1C:6 9,  CBC, CMP, TSH     Current Medications   Medication list in facility chart was reviewed and necessary changes made         Assessment and Plan   Type 2 diabetes mellitus without complication, without long-term current use of insulin ([address] )  Last HbA1c done on 1/14, stable with Lantus  Hypothyroidism due to Hashimoto's thyroiditis  TSH on 1/14 stable  On Levothyroxine  Essential hypertension  BP stable with Lisinopril and Amlodipine  Stable  Late onset Alzheimer's disease without behavioral disturbance (HCC)  Stable, needs NH Care          Helder Pacheco MD  4/23/87558:61 PM

## 2021-02-23 ENCOUNTER — NURSING HOME VISIT (OUTPATIENT)
Dept: GERIATRICS | Facility: OTHER | Age: 86
End: 2021-02-23
Payer: COMMERCIAL

## 2021-02-23 DIAGNOSIS — I10 ESSENTIAL HYPERTENSION: ICD-10-CM

## 2021-02-23 DIAGNOSIS — E11.9 TYPE 2 DIABETES MELLITUS WITHOUT COMPLICATION, WITHOUT LONG-TERM CURRENT USE OF INSULIN (HCC): ICD-10-CM

## 2021-02-23 DIAGNOSIS — E06.3 HYPOTHYROIDISM DUE TO HASHIMOTO'S THYROIDITIS: ICD-10-CM

## 2021-02-23 DIAGNOSIS — G30.1 LATE ONSET ALZHEIMER'S DISEASE WITHOUT BEHAVIORAL DISTURBANCE (HCC): Primary | ICD-10-CM

## 2021-02-23 DIAGNOSIS — E03.8 HYPOTHYROIDISM DUE TO HASHIMOTO'S THYROIDITIS: ICD-10-CM

## 2021-02-23 DIAGNOSIS — K21.9 GASTROESOPHAGEAL REFLUX DISEASE WITHOUT ESOPHAGITIS: ICD-10-CM

## 2021-02-23 DIAGNOSIS — F02.80 LATE ONSET ALZHEIMER'S DISEASE WITHOUT BEHAVIORAL DISTURBANCE (HCC): Primary | ICD-10-CM

## 2021-02-23 PROCEDURE — 99309 SBSQ NF CARE MODERATE MDM 30: CPT | Performed by: FAMILY MEDICINE

## 2021-02-23 NOTE — PROGRESS NOTES
[facility]  [address]  [phone]  Facility: [address]/          NAME: Miguel Ángel Anderson  AGE: 80 y o  SEX: female    DATE OF ENCOUNTER: 2/23/2021    Chief Complaint     Pt has no complaint     History of Present Illness     HPI    The following portions of the patient's history were reviewed and updated as appropriate (from facility chart and hospital records): allergies, current medications, past family history, past medical history, past social history, past surgical history and problem list   Pt was see and examined for f/u on Hypothyroidism, Dementia, HTN, DM  Pt continues with NH care, BP today is elevated but otherwise has been stable and acceptable  BS stable  No behaviors  TSH done in January and WNL  Continues with NH care  Walks using her walker  Wt is stable  No other specific issues or concerns  Review of Systems     Review of Systems   Unable to perform ROS: Dementia (pt denies any pain  )       Active Problem List     Patient Active Problem List   Diagnosis    Type 2 diabetes mellitus without complication, without long-term current use of insulin ([facility] )    Mixed hyperlipidemia    Late onset Alzheimer's disease without behavioral disturbance ([facility] )    Essential hypertension    Hypothyroidism due to Hashimoto's thyroiditis    Constipation    Failure to thrive in adult    Ambulatory dysfunction    Gastroesophageal reflux disease without esophagitis    Low O2 saturation       Objective     Vitals: wt:153Ibs         BP & BS log reviewed    Afebrile     Physical Exam  Vitals signs and nursing note reviewed  Constitutional:       General: She is not in acute distress  Appearance: Normal appearance  She is well-developed  She is not ill-appearing, toxic-appearing or diaphoretic  HENT:      Head: Normocephalic and atraumatic        Right Ear: External ear normal       Left Ear: External ear normal       Ears:      Comments: [place]     Nose: Nose normal  No congestion or rhinorrhea  Mouth/Throat:      Mouth: Mucous membranes are moist       Comments: Dentures  Eyes:      General: No scleral icterus  Right eye: No discharge  Left eye: No discharge  Conjunctiva/sclera: Conjunctivae normal       Pupils: Pupils are equal, round, and reactive to light  Neck:      Musculoskeletal: Normal range of motion and neck supple  No neck rigidity or muscular tenderness  Cardiovascular:      Rate and Rhythm: Normal rate and regular rhythm  Heart sounds: Normal heart sounds  No murmur  No friction rub  No gallop  Pulmonary:      Effort: Pulmonary effort is normal  No respiratory distress  Breath sounds: Normal breath sounds  No stridor  No wheezing, rhonchi or rales  Chest:      Chest wall: No tenderness  Abdominal:      General: Bowel sounds are normal  There is no distension  Palpations: Abdomen is soft  There is no mass  Tenderness: There is no abdominal tenderness  There is no guarding or rebound  Hernia: No hernia is present  Genitourinary:     Comments: Deferred  Musculoskeletal:         General: No swelling, tenderness, deformity or signs of injury  Right lower leg: Edema present  Left lower leg: Edema present  Comments: Sitting in a chair, moves all extremities  Lymphadenopathy:      Cervical: No cervical adenopathy  Skin:     General: Skin is warm and dry  Coloration: Skin is not jaundiced or pale  Findings: No bruising, erythema, lesion or rash  Comments: Didn't examine sacral area  Neurological:      Mental Status: She is alert  Cranial Nerves: Cranial nerve deficit ([place]) present  Comments: Follows commands  Forgetful  Limited due to pt's dementia      Psychiatric:         Behavior: Behavior normal          Pertinent Laboratory/Diagnostic Studies:  1/14:CBC, CMP, TSH, HbA1C:7 1    Current Medications   Medication list in facility chart was reviewed and no changes made  Assessment and Plan   Late onset Alzheimer's disease without behavioral disturbance ([facility] )  No behaviors, continues with NH care, wt is stable  Overall stable  Type 2 diabetes mellitus without complication, without long-term current use of insulin ([facility])    HbA1C 7 1 in January, stable with Lantus  Will continue with monitoring  Hypothyroidism due to Hashimoto's thyroiditis  Last TSH WNL in January, on Levothyroxine  Will continue with monitoring  Asymptomatic  Essential hypertension  BP stable with Amlodipine, and Lisinopril  Will continue with monitoring  Last lab done in January  Gastroesophageal reflux disease without esophagitis  On Omeprazole   Will continue with monitoring,       Antoine Arrieta MD  7/01/83343:18 PM

## 2021-03-18 ENCOUNTER — NURSING HOME VISIT (OUTPATIENT)
Dept: GERIATRICS | Facility: OTHER | Age: 86
End: 2021-03-18
Payer: COMMERCIAL

## 2021-03-18 DIAGNOSIS — F02.80 LATE ONSET ALZHEIMER'S DISEASE WITHOUT BEHAVIORAL DISTURBANCE (HCC): ICD-10-CM

## 2021-03-18 DIAGNOSIS — K21.9 GASTROESOPHAGEAL REFLUX DISEASE WITHOUT ESOPHAGITIS: ICD-10-CM

## 2021-03-18 DIAGNOSIS — E11.9 TYPE 2 DIABETES MELLITUS WITHOUT COMPLICATION, WITHOUT LONG-TERM CURRENT USE OF INSULIN (HCC): Primary | ICD-10-CM

## 2021-03-18 DIAGNOSIS — I10 ESSENTIAL HYPERTENSION: ICD-10-CM

## 2021-03-18 DIAGNOSIS — E03.8 HYPOTHYROIDISM DUE TO HASHIMOTO'S THYROIDITIS: ICD-10-CM

## 2021-03-18 DIAGNOSIS — E06.3 HYPOTHYROIDISM DUE TO HASHIMOTO'S THYROIDITIS: ICD-10-CM

## 2021-03-18 DIAGNOSIS — G30.1 LATE ONSET ALZHEIMER'S DISEASE WITHOUT BEHAVIORAL DISTURBANCE (HCC): ICD-10-CM

## 2021-03-18 PROCEDURE — 99309 SBSQ NF CARE MODERATE MDM 30: CPT | Performed by: FAMILY MEDICINE

## 2021-03-18 NOTE — ASSESSMENT & PLAN NOTE
TSH was elevated in Feb  Next TSH will be next week  On Levothyroxine  Will continue with monitoring

## 2021-04-15 ENCOUNTER — NURSING HOME VISIT (OUTPATIENT)
Dept: GERIATRICS | Facility: OTHER | Age: 86
End: 2021-04-15
Payer: COMMERCIAL

## 2021-04-15 DIAGNOSIS — E06.3 HYPOTHYROIDISM DUE TO HASHIMOTO'S THYROIDITIS: ICD-10-CM

## 2021-04-15 DIAGNOSIS — K21.9 GASTROESOPHAGEAL REFLUX DISEASE WITHOUT ESOPHAGITIS: ICD-10-CM

## 2021-04-15 DIAGNOSIS — I10 ESSENTIAL HYPERTENSION: ICD-10-CM

## 2021-04-15 DIAGNOSIS — E03.8 HYPOTHYROIDISM DUE TO HASHIMOTO'S THYROIDITIS: ICD-10-CM

## 2021-04-15 DIAGNOSIS — F02.80 LATE ONSET ALZHEIMER'S DISEASE WITHOUT BEHAVIORAL DISTURBANCE (HCC): Primary | ICD-10-CM

## 2021-04-15 DIAGNOSIS — E11.9 TYPE 2 DIABETES MELLITUS WITHOUT COMPLICATION, WITHOUT LONG-TERM CURRENT USE OF INSULIN (HCC): ICD-10-CM

## 2021-04-15 DIAGNOSIS — G30.1 LATE ONSET ALZHEIMER'S DISEASE WITHOUT BEHAVIORAL DISTURBANCE (HCC): Primary | ICD-10-CM

## 2021-04-15 PROCEDURE — 99309 SBSQ NF CARE MODERATE MDM 30: CPT | Performed by: FAMILY MEDICINE

## 2021-04-15 NOTE — PROGRESS NOTES
[facility]  [address]  [phone]  Facility: [address] [place] [address]/          NAME: Giselle Pacheco  AGE: 80 y o  SEX: female    DATE OF ENCOUNTER: 4/15/2021    Chief Complaint     Pt has no specific complaint     History of Present Illness     HPI    The following portions of the patient's history were reviewed and updated as appropriate (from facility chart and hospital records): allergies, current medications, past family history, past medical history, past social history, past surgical history and problem list  Pt was seen and examined for f/u on Hypothyroidism, DM, HTN, Dementia, GERD  No behaviors  Wt stable  BP and BS stable  No report of GERD symptoms  Last TSH done in January  Last TSH was slightly elevated and pt will have repeat lab on 5/6  Review of Systems     Review of Systems   Unable to perform ROS: Dementia   Constitutional:        Denies any pain or having any issues        Active Problem List     Patient Active Problem List   Diagnosis    Type 2 diabetes mellitus without complication, without long-term current use of insulin ([facility] )    Mixed hyperlipidemia    Late onset Alzheimer's disease without behavioral disturbance ([facility] )    Essential hypertension    Hypothyroidism due to Hashimoto's thyroiditis    Constipation    Failure to thrive in adult    Ambulatory dysfunction    Gastroesophageal reflux disease without esophagitis    Low O2 saturation       Objective     Vitals: wt:160 6Ibs        BP:120/63    BS log reviewed     Afebrile   BS log reviewed     Physical Exam    Pertinent Laboratory/Diagnostic Studies:  January: CBC ,CMP, HbA1C, TSH     Current Medications   Medication list in facility chart was reviewed and no changes made  Assessment and Plan     Late onset Alzheimer's disease without behavioral disturbance ([facility] )  Pt continues with NH care  Wt stable  Will continue with NH Care       Essential hypertension  BP stable with Amlodipine and Lisinopril  Ordered labs for 5/6  Type 2 diabetes mellitus without complication, without long-term current use of insulin (HCC)  BS stable with Metformin  Ordered HbA1c to be done on 5/6 with pt's due TSH  Hypothyroidism due to Hashimoto's thyroiditis  On Levothyroxine  Will have TSH on 5/6  Gastroesophageal reflux disease without esophagitis  Stable with Omeprazole  Ordered labs for 5/6       Alejandra Rojas MD  0/26/22083:18 PM

## 2021-05-18 ENCOUNTER — NURSING HOME VISIT (OUTPATIENT)
Dept: GERIATRICS | Facility: OTHER | Age: 86
End: 2021-05-18
Payer: COMMERCIAL

## 2021-05-18 DIAGNOSIS — K21.9 GASTROESOPHAGEAL REFLUX DISEASE WITHOUT ESOPHAGITIS: ICD-10-CM

## 2021-05-18 DIAGNOSIS — G30.1 LATE ONSET ALZHEIMER'S DISEASE WITHOUT BEHAVIORAL DISTURBANCE (HCC): Primary | ICD-10-CM

## 2021-05-18 DIAGNOSIS — E06.3 HYPOTHYROIDISM DUE TO HASHIMOTO'S THYROIDITIS: ICD-10-CM

## 2021-05-18 DIAGNOSIS — E03.8 HYPOTHYROIDISM DUE TO HASHIMOTO'S THYROIDITIS: ICD-10-CM

## 2021-05-18 DIAGNOSIS — E11.9 TYPE 2 DIABETES MELLITUS WITHOUT COMPLICATION, WITHOUT LONG-TERM CURRENT USE OF INSULIN (HCC): ICD-10-CM

## 2021-05-18 DIAGNOSIS — I10 ESSENTIAL HYPERTENSION: ICD-10-CM

## 2021-05-18 DIAGNOSIS — F02.80 LATE ONSET ALZHEIMER'S DISEASE WITHOUT BEHAVIORAL DISTURBANCE (HCC): Primary | ICD-10-CM

## 2021-05-18 PROCEDURE — 99309 SBSQ NF CARE MODERATE MDM 30: CPT | Performed by: FAMILY MEDICINE

## 2021-05-18 NOTE — PROGRESS NOTES
[facility]  [address]  [phone]  Facility: [address]/          NAME: Gaby Last  AGE: 80 y o  SEX: female    DATE OF ENCOUNTER: 5/18/2021    Chief Complaint     Pt has no specific complaint     History of Present Illness     HPI    The following portions of the patient's history were reviewed and updated as appropriate (from facility chart and hospital records): allergies, current medications, past family history, past medical history, past social history, past surgical history and problem list   Pt was seen and examined for f/u on Dementia, HTN, Hypothyroidism, DM, GERD  Pt has been stable  Walks using her walker  Wt, BP and BS have been stable  GERD symptoms stable  Last TSH done early this month, and slightly elevated  No behaviors  Continues with NH care  Pt is overall stable  Review of Systems     Review of Systems   Unable to perform ROS: Dementia       Active Problem List     Patient Active Problem List   Diagnosis    Type 2 diabetes mellitus without complication, without long-term current use of insulin ([facility] )    Mixed hyperlipidemia    Late onset Alzheimer's disease without behavioral disturbance ([facility] )    Essential hypertension    Hypothyroidism due to Hashimoto's thyroiditis    Constipation    Failure to thrive in adult    Ambulatory dysfunction    Gastroesophageal reflux disease without esophagitis    Low O2 saturation       Objective     Vitals: wt:161 8Ibs            BP:139/85   BS log reviewed      Afebrile     Physical Exam  Vitals signs and nursing note reviewed  Constitutional:       General: She is not in acute distress  Appearance: Normal appearance  She is well-developed  She is not ill-appearing, toxic-appearing or diaphoretic  HENT:      Head: Normocephalic and atraumatic        Right Ear: External ear normal       Left Ear: External ear normal       Ears:      Comments: [place]     Nose: Nose normal  No congestion or rhinorrhea  Mouth/Throat:      Mouth: Mucous membranes are moist       Comments: Missing teeth   Eyes:      General: No scleral icterus  Right eye: No discharge  Left eye: No discharge  Conjunctiva/sclera: Conjunctivae normal       Pupils: Pupils are equal, round, and reactive to light  Neck:      Musculoskeletal: Normal range of motion and neck supple  No neck rigidity or muscular tenderness  Cardiovascular:      Rate and Rhythm: Normal rate and regular rhythm  Heart sounds: Normal heart sounds  No murmur  No friction rub  No gallop  Pulmonary:      Effort: Pulmonary effort is normal  No respiratory distress  Breath sounds: Normal breath sounds  No stridor  No wheezing, rhonchi or rales  Chest:      Chest wall: No tenderness  Abdominal:      General: Abdomen is flat  Bowel sounds are normal  There is no distension  Palpations: Abdomen is soft  There is no mass  Tenderness: There is no abdominal tenderness  There is no guarding or rebound  Hernia: No hernia is present  Comments: Protuberant    Genitourinary:     Comments: Deferred  Musculoskeletal:         General: No swelling, tenderness, deformity or signs of injury  Left lower leg: Edema present  Lymphadenopathy:      Cervical: No cervical adenopathy  Skin:     General: Skin is warm and dry  Coloration: Skin is not jaundiced or pale  Findings: No bruising, erythema, lesion or rash  Comments: Didn't examine sacral area  Neurological:      Mental Status: She is alert  Cranial Nerves: Cranial nerve deficit ([place]) present  Comments: Limited with pt's dementia, follows simple commands  Psychiatric:         Behavior: Behavior normal          Pertinent Laboratory/Diagnostic Studies:  5/6: CBC, CMP, TSH, HbA1C    Current Medications   Medication list in facility chart was reviewed and no changes made         Assessment and Plan   Late onset Alzheimer's disease without behavioral disturbance (HCC)  No behaviors  Wt stable  Continues with NH care  Type 2 diabetes mellitus without complication, without long-term current use of insulin (HCC)    BS stable with Lantus  Last HbA1C done on 5/6  Hypothyroidism due to Hashimoto's thyroiditis  TSH early this month at higher side, will repeat TSH on 6/3  Essential hypertension  BP stable with Amlodipine and Lisinopril  Will continue with monitoring  Ordered BMP for 6/3/2021  Gastroesophageal reflux disease without esophagitis  On PPI, stable       Carlos James MD  2/87/47779:83 PM

## 2021-06-24 ENCOUNTER — NURSING HOME VISIT (OUTPATIENT)
Dept: GERIATRICS | Facility: OTHER | Age: 86
End: 2021-06-24
Payer: COMMERCIAL

## 2021-06-24 DIAGNOSIS — E11.9 TYPE 2 DIABETES MELLITUS WITHOUT COMPLICATION, WITHOUT LONG-TERM CURRENT USE OF INSULIN (HCC): Primary | ICD-10-CM

## 2021-06-24 DIAGNOSIS — I10 ESSENTIAL HYPERTENSION: ICD-10-CM

## 2021-06-24 DIAGNOSIS — G30.1 LATE ONSET ALZHEIMER'S DISEASE WITHOUT BEHAVIORAL DISTURBANCE (HCC): ICD-10-CM

## 2021-06-24 DIAGNOSIS — E03.8 HYPOTHYROIDISM DUE TO HASHIMOTO'S THYROIDITIS: ICD-10-CM

## 2021-06-24 DIAGNOSIS — F02.80 LATE ONSET ALZHEIMER'S DISEASE WITHOUT BEHAVIORAL DISTURBANCE (HCC): ICD-10-CM

## 2021-06-24 DIAGNOSIS — E06.3 HYPOTHYROIDISM DUE TO HASHIMOTO'S THYROIDITIS: ICD-10-CM

## 2021-06-24 DIAGNOSIS — K21.9 GASTROESOPHAGEAL REFLUX DISEASE WITHOUT ESOPHAGITIS: ICD-10-CM

## 2021-06-24 PROCEDURE — 99309 SBSQ NF CARE MODERATE MDM 30: CPT | Performed by: FAMILY MEDICINE

## 2021-06-24 NOTE — PROGRESS NOTES
[facility]  [address]  [phone]  Facility: [place] [address]          NAME: Giselle Pacheco  AGE: 80 y o  SEX: female    DATE OF ENCOUNTER: 6/24/2021    Chief Complaint     Pt has no complaint     History of Present Illness     HPI    The following portions of the patient's history were reviewed and updated as appropriate (from facility chart and hospital records): allergies, current medications, past family history, past medical history, past social history, past surgical history and problem list   Pt was seen and examined for f/u on Dementia, Hypothyroidism, DM, HTN and GERD  Pt has been stale  Participates in the activities  Walks using her walker  Last TSH this month was high and her Levothyroxine was adjusted  BP stable  GERD symptoms stable  No specific issues or concerns  No behaviors  Pt slowly gaining wt  Review of Systems     Review of Systems   Unable to perform ROS: Dementia       Active Problem List     Patient Active Problem List   Diagnosis    Type 2 diabetes mellitus without complication, without long-term current use of insulin ([facility] [address] )    Mixed hyperlipidemia    Late onset Alzheimer's disease without behavioral disturbance ([facility] [address] )    Essential hypertension    Hypothyroidism due to Hashimoto's thyroiditis    Constipation    Failure to thrive in adult    Ambulatory dysfunction    Gastroesophageal reflux disease without esophagitis    Low O2 saturation       Objective     Vitals: wt:164IBs     BP and BS log reviewed   Afebrile     Physical Exam  Vitals and nursing note reviewed  Constitutional:       General: She is not in acute distress  Appearance: Normal appearance  She is well-developed  She is not ill-appearing, toxic-appearing or diaphoretic  HENT:      Head: Normocephalic and atraumatic  Right Ear: External ear normal       Left Ear: External ear normal       Nose: Nose normal  No congestion or rhinorrhea        Mouth/Throat: Mouth: Mucous membranes are moist       Comments: Missing teeth   Eyes:      General: No scleral icterus  Right eye: No discharge  Left eye: No discharge  Conjunctiva/sclera: Conjunctivae normal       Pupils: Pupils are equal, round, and reactive to light  Cardiovascular:      Rate and Rhythm: Normal rate and regular rhythm  Heart sounds: Normal heart sounds  No murmur heard  No friction rub  No gallop  Pulmonary:      Effort: Pulmonary effort is normal  No respiratory distress  Breath sounds: Normal breath sounds  No stridor  No wheezing, rhonchi or rales  Chest:      Chest wall: No tenderness  Abdominal:      General: Bowel sounds are normal  There is no distension  Palpations: Abdomen is soft  There is no mass  Tenderness: There is no abdominal tenderness  There is no guarding or rebound  Hernia: No hernia is present  Genitourinary:     Comments: Deferred  Musculoskeletal:         General: Swelling present  No tenderness, deformity or signs of injury  Cervical back: Normal range of motion and neck supple  Right lower leg: Edema present  Left lower leg: Edema present  Comments: Moves extremities  Walked small distance with slow pacing using her walker  Lymphadenopathy:      Cervical: No cervical adenopathy  Skin:     General: Skin is warm and dry  Coloration: Skin is not jaundiced or pale  Findings: No bruising, erythema, lesion or rash  Comments: Didn't examine sacral area  Neurological:      Mental Status: She is alert  Cranial Nerves: Cranial nerve deficit ([place]) present  Comments: Limited due to pt's dementia    Psychiatric:         Behavior: Behavior normal          Pertinent Laboratory/Diagnostic Studies:  BMP, and TSH early this month and high, MAY: HbA1C, CBC    Current Medications   Medication list in facility chart was reviewed and miguel made         Assessment and Plan   Type 2 diabetes mellitus without complication, without long-term current use of insulin (HCC)    HA1C stable this month  Stable with Lantus  Will continue with monitoring  Essential hypertension  BMP stable this month, on Amlodipine and Lisinopril  Late onset Alzheimer's disease without behavioral disturbance ([facility] [address] )  No behaviors  Continues with NH care, wt stable  Hypothyroidism due to Hashimoto's thyroiditis  Levothyroxine dose was increased early this month due to high TSH,next TSH in July  Gastroesophageal reflux disease without esophagitis  Stable with Omeprazole  Will continue with monitoring         Clary Miller MD  7/73/58668:18 PM

## 2021-08-24 ENCOUNTER — NURSING HOME VISIT (OUTPATIENT)
Dept: GERIATRICS | Facility: OTHER | Age: 86
End: 2021-08-24
Payer: COMMERCIAL

## 2021-08-24 DIAGNOSIS — G30.1 LATE ONSET ALZHEIMER'S DISEASE WITHOUT BEHAVIORAL DISTURBANCE (HCC): ICD-10-CM

## 2021-08-24 DIAGNOSIS — F02.80 LATE ONSET ALZHEIMER'S DISEASE WITHOUT BEHAVIORAL DISTURBANCE (HCC): ICD-10-CM

## 2021-08-24 DIAGNOSIS — E11.9 TYPE 2 DIABETES MELLITUS WITHOUT COMPLICATION, WITHOUT LONG-TERM CURRENT USE OF INSULIN (HCC): Primary | ICD-10-CM

## 2021-08-24 DIAGNOSIS — I10 ESSENTIAL HYPERTENSION: ICD-10-CM

## 2021-08-24 DIAGNOSIS — E03.8 HYPOTHYROIDISM DUE TO HASHIMOTO'S THYROIDITIS: ICD-10-CM

## 2021-08-24 DIAGNOSIS — E06.3 HYPOTHYROIDISM DUE TO HASHIMOTO'S THYROIDITIS: ICD-10-CM

## 2021-08-24 PROCEDURE — 99309 SBSQ NF CARE MODERATE MDM 30: CPT | Performed by: FAMILY MEDICINE

## 2021-08-24 NOTE — PROGRESS NOTES
[facility]  [address]  [phone]  Facility: [place] [address]          NAME: Lenka Jimenez  AGE: 80 y o  SEX: female    DATE OF ENCOUNTER: 8/24/2021    Chief Complaint     Pt has no specific complaint     History of Present Illness     HPI    The following portions of the patient's history were reviewed and updated as appropriate (from facility chart and hospital records): allergies, current medications, past family history, past medical history, past social history, past surgical history and problem list   Pt was seen and examined for f/u on Dementia, DM, HTN, Hypothyroidism, GERD  Pt has been stable  Continues with NH care  Eats well  Continues with gaining wt  Last HbA1C, and routine lab done in May  Last TSH done in June  GERD symptoms stable  No behaviors  Walks using her walker  Review of Systems     Review of Systems   Unable to perform ROS: Dementia       Active Problem List     Patient Active Problem List   Diagnosis    Type 2 diabetes mellitus without complication, without long-term current use of insulin ([facility] [address]    Mixed hyperlipidemia    Late onset Alzheimer's disease without behavioral disturbance ([facility] [address] [facility]    Essential hypertension    Hypothyroidism due to Hashimoto's thyroiditis    Constipation    Failure to thrive in adult    Ambulatory dysfunction    Gastroesophageal reflux disease without esophagitis    Low O2 saturation       Objective     Vitals: wt:165  1IBs        BP:119/64    SC:69   Afebrile  BS log reviewed     Physical Exam  Vitals and nursing note reviewed  Constitutional:       General: She is not in acute distress  Appearance: Normal appearance  She is well-developed  She is not ill-appearing, toxic-appearing or diaphoretic  HENT:      Head: Normocephalic and atraumatic  Right Ear: External ear normal       Left Ear: External ear normal       Nose: Nose normal  No congestion or rhinorrhea        Mouth/Throat: Comments: Dentures   Eyes:      General: No scleral icterus  Right eye: No discharge  Left eye: No discharge  Conjunctiva/sclera: Conjunctivae normal       Pupils: Pupils are equal, round, and reactive to light  Cardiovascular:      Rate and Rhythm: Normal rate and regular rhythm  Heart sounds: Normal heart sounds  No murmur heard  No friction rub  No gallop  Pulmonary:      Effort: Pulmonary effort is normal  No respiratory distress  Breath sounds: Normal breath sounds  No stridor  No wheezing, rhonchi or rales  Chest:      Chest wall: No tenderness  Abdominal:      General: Bowel sounds are normal  There is no distension  Palpations: Abdomen is soft  There is no mass  Tenderness: There is no abdominal tenderness  There is no guarding or rebound  Hernia: No hernia is present  Genitourinary:     Comments: Deferred  Musculoskeletal:         General: Swelling present  No tenderness, deformity or signs of injury  Cervical back: Normal range of motion and neck supple  No rigidity or tenderness  Right lower leg: Edema present  Left lower leg: Edema present  Comments: In a chair sitting, moves all extremities  Lymphadenopathy:      Cervical: No cervical adenopathy  Skin:     General: Skin is warm and dry  Coloration: Skin is not jaundiced or pale  Findings: No bruising, erythema, lesion or rash  Comments: Didn't examine sacral area  Neurological:      Mental Status: She is alert  Cranial Nerves: Cranial nerve deficit present  Psychiatric:         Behavior: Behavior normal          Pertinent Laboratory/Diagnostic Studies:  7/15: TSH, 6/3: BMP May: HbA1C,CBC, CMP  Current Medications   Medication list in facility chart was reviewed and no changes made         Assessment and Plan   Type 2 diabetes mellitus without complication, without long-term current use of insulin (HCC)    Last HbA1C done in May, will check labs, BS stable  On Lantus  Hypothyroidism due to Hashimoto's thyroiditis  Last TSH in June, will check labs  Stable  On Levothyroxine  Essential hypertension  BP stable with Amlodipine and Lisinopril  Will continue with monitoring  Late onset Alzheimer's disease without behavioral disturbance (HCC)  Stable  Continues with gaining wt  No behaviors           Arleth Alexander MD  7/16/19312:31 PM

## 2021-09-21 ENCOUNTER — NURSING HOME VISIT (OUTPATIENT)
Dept: GERIATRICS | Facility: OTHER | Age: 86
End: 2021-09-21
Payer: COMMERCIAL

## 2021-09-21 DIAGNOSIS — I10 ESSENTIAL HYPERTENSION: ICD-10-CM

## 2021-09-21 DIAGNOSIS — E06.3 HYPOTHYROIDISM DUE TO HASHIMOTO'S THYROIDITIS: ICD-10-CM

## 2021-09-21 DIAGNOSIS — E03.8 HYPOTHYROIDISM DUE TO HASHIMOTO'S THYROIDITIS: ICD-10-CM

## 2021-09-21 DIAGNOSIS — E11.9 TYPE 2 DIABETES MELLITUS WITHOUT COMPLICATION, WITHOUT LONG-TERM CURRENT USE OF INSULIN (HCC): ICD-10-CM

## 2021-09-21 DIAGNOSIS — G30.1 LATE ONSET ALZHEIMER'S DISEASE WITHOUT BEHAVIORAL DISTURBANCE (HCC): Primary | ICD-10-CM

## 2021-09-21 DIAGNOSIS — F02.80 LATE ONSET ALZHEIMER'S DISEASE WITHOUT BEHAVIORAL DISTURBANCE (HCC): Primary | ICD-10-CM

## 2021-09-21 PROCEDURE — 99309 SBSQ NF CARE MODERATE MDM 30: CPT | Performed by: FAMILY MEDICINE

## 2021-09-21 NOTE — PROGRESS NOTES
[facility]  [address]  [phone]  Facility: [place] [address]          NAME: Babs Rudd  AGE: 80 y o  SEX: female    DATE OF ENCOUNTER: 9/21/2021    Chief Complaint     Pt has no specific complaint     History of Present Illness     HPI    The following portions of the patient's history were reviewed and updated as appropriate (from facility chart and hospital records): allergies, current medications, past family history, past medical history, past social history, past surgical history and problem list  Pt was seen and examined for f/u on Hypothyroidism, DM, HTN, and dementia  Last lab done on 9/9 and stable  BS, BP and wt stable  TSH slightly elevated this month but T4 WNL, and pt overall asymptomatic  Walks using her walker  Participate in activities  Overall stable  Review of Systems     Review of Systems   Unable to perform ROS: Dementia       Active Problem List     Patient Active Problem List   Diagnosis    Type 2 diabetes mellitus without complication, without long-term current use of insulin ([facility] [address] )    Mixed hyperlipidemia    Late onset Alzheimer's disease without behavioral disturbance ([facility] [address] )    Essential hypertension    Hypothyroidism due to Hashimoto's thyroiditis    Constipation    Failure to thrive in adult    Ambulatory dysfunction    Gastroesophageal reflux disease without esophagitis    Low O2 saturation       Objective     Vitals: wt"168 8Ibs    BP:148/64   AFebriel      Physical Exam  Vitals and nursing note reviewed  Constitutional:       General: She is not in acute distress  Appearance: Normal appearance  She is well-developed  She is not ill-appearing, toxic-appearing or diaphoretic  HENT:      Head: Normocephalic and atraumatic  Right Ear: External ear normal       Left Ear: External ear normal       Ears:      Comments: [place]     Nose: Nose normal  No congestion or rhinorrhea        Mouth/Throat:      Comments: Missing teeth   Eyes:      General: No scleral icterus  Right eye: No discharge  Left eye: No discharge  Conjunctiva/sclera: Conjunctivae normal       Pupils: Pupils are equal, round, and reactive to light  Cardiovascular:      Rate and Rhythm: Normal rate and regular rhythm  Heart sounds: Normal heart sounds  No murmur heard  No friction rub  No gallop  Pulmonary:      Effort: Pulmonary effort is normal  No respiratory distress  Breath sounds: Normal breath sounds  No stridor  No wheezing, rhonchi or rales  Chest:      Chest wall: No tenderness  Abdominal:      General: Bowel sounds are normal  There is no distension  Palpations: Abdomen is soft  There is no mass  Tenderness: There is no abdominal tenderness  There is no guarding or rebound  Hernia: No hernia is present  Genitourinary:     Comments: Deferred  Musculoskeletal:         General: Swelling (of feet and ankles  ) present  No tenderness, deformity or signs of injury  Cervical back: Normal range of motion and neck supple  No rigidity or tenderness  Right lower leg: Edema present  Left lower leg: Edema present  Lymphadenopathy:      Cervical: No cervical adenopathy  Skin:     General: Skin is warm and dry  Coloration: Skin is not jaundiced or pale  Findings: No bruising, erythema, lesion or rash  Comments: Didn't examine sacral area  Neurological:      Mental Status: She is alert  Cranial Nerves: Cranial nerve deficit ([place]) present  Comments: Not oriented, follows commands  Psychiatric:         Behavior: Behavior normal          Pertinent Laboratory/Diagnostic Studies:  9/9: CBC, CMP, HbA1C    Current Medications   Medication list in facility chart was reviewed and necessary changes made  Assessment and Plan     Late onset Alzheimer's disease without behavioral disturbance ([facility] [address] )  No behaviors  Stable   Continues with NH care     Type 2 diabetes mellitus without complication, without long-term current use of insulin (HCC)  HbA1C stable this month, on Lantus  Hypothyroidism due to Hashimoto's thyroiditis  On Levothyroxine, TSH this month slightly elevated but pt is asymptomatic, will continue with monitoring  Essential hypertension  BP stable with Amlodipine and Lisinopril  Will continue with monitoring         Clinton Bella MD  1/27/09124:97 PM

## 2021-09-22 NOTE — ASSESSMENT & PLAN NOTE
On Levothyroxine, TSH this month slightly elevated but pt is asymptomatic, will continue with monitoring

## 2021-10-12 ENCOUNTER — NURSING HOME VISIT (OUTPATIENT)
Dept: GERIATRICS | Facility: OTHER | Age: 86
End: 2021-10-12
Payer: COMMERCIAL

## 2021-10-12 DIAGNOSIS — E11.9 TYPE 2 DIABETES MELLITUS WITHOUT COMPLICATION, WITHOUT LONG-TERM CURRENT USE OF INSULIN (HCC): ICD-10-CM

## 2021-10-12 DIAGNOSIS — I10 ESSENTIAL HYPERTENSION: ICD-10-CM

## 2021-10-12 DIAGNOSIS — F02.80 LATE ONSET ALZHEIMER'S DISEASE WITHOUT BEHAVIORAL DISTURBANCE (HCC): Primary | ICD-10-CM

## 2021-10-12 DIAGNOSIS — E03.8 HYPOTHYROIDISM DUE TO HASHIMOTO'S THYROIDITIS: ICD-10-CM

## 2021-10-12 DIAGNOSIS — E06.3 HYPOTHYROIDISM DUE TO HASHIMOTO'S THYROIDITIS: ICD-10-CM

## 2021-10-12 DIAGNOSIS — G30.1 LATE ONSET ALZHEIMER'S DISEASE WITHOUT BEHAVIORAL DISTURBANCE (HCC): Primary | ICD-10-CM

## 2021-10-12 PROCEDURE — 99309 SBSQ NF CARE MODERATE MDM 30: CPT | Performed by: FAMILY MEDICINE

## 2021-11-11 ENCOUNTER — NURSING HOME VISIT (OUTPATIENT)
Dept: GERIATRICS | Facility: OTHER | Age: 86
End: 2021-11-11
Payer: COMMERCIAL

## 2021-11-11 DIAGNOSIS — K21.9 GASTROESOPHAGEAL REFLUX DISEASE WITHOUT ESOPHAGITIS: ICD-10-CM

## 2021-11-11 DIAGNOSIS — E03.8 HYPOTHYROIDISM DUE TO HASHIMOTO'S THYROIDITIS: ICD-10-CM

## 2021-11-11 DIAGNOSIS — F02.80 LATE ONSET ALZHEIMER'S DISEASE WITHOUT BEHAVIORAL DISTURBANCE (HCC): ICD-10-CM

## 2021-11-11 DIAGNOSIS — E11.9 TYPE 2 DIABETES MELLITUS WITHOUT COMPLICATION, WITHOUT LONG-TERM CURRENT USE OF INSULIN (HCC): Primary | ICD-10-CM

## 2021-11-11 DIAGNOSIS — G30.1 LATE ONSET ALZHEIMER'S DISEASE WITHOUT BEHAVIORAL DISTURBANCE (HCC): ICD-10-CM

## 2021-11-11 DIAGNOSIS — I10 ESSENTIAL HYPERTENSION: ICD-10-CM

## 2021-11-11 DIAGNOSIS — E06.3 HYPOTHYROIDISM DUE TO HASHIMOTO'S THYROIDITIS: ICD-10-CM

## 2021-11-11 PROBLEM — R79.81 LOW O2 SATURATION: Status: RESOLVED | Noted: 2020-12-06 | Resolved: 2021-11-11

## 2021-11-11 PROCEDURE — 99309 SBSQ NF CARE MODERATE MDM 30: CPT | Performed by: FAMILY MEDICINE

## 2021-12-30 ENCOUNTER — NURSING HOME VISIT (OUTPATIENT)
Dept: GERIATRICS | Facility: OTHER | Age: 86
End: 2021-12-30
Payer: COMMERCIAL

## 2021-12-30 DIAGNOSIS — I10 ESSENTIAL HYPERTENSION: ICD-10-CM

## 2021-12-30 DIAGNOSIS — G30.1 LATE ONSET ALZHEIMER'S DISEASE WITHOUT BEHAVIORAL DISTURBANCE (HCC): ICD-10-CM

## 2021-12-30 DIAGNOSIS — F02.80 LATE ONSET ALZHEIMER'S DISEASE WITHOUT BEHAVIORAL DISTURBANCE (HCC): ICD-10-CM

## 2021-12-30 DIAGNOSIS — E11.9 TYPE 2 DIABETES MELLITUS WITHOUT COMPLICATION, WITHOUT LONG-TERM CURRENT USE OF INSULIN (HCC): ICD-10-CM

## 2021-12-30 DIAGNOSIS — E03.8 HYPOTHYROIDISM DUE TO HASHIMOTO'S THYROIDITIS: Primary | ICD-10-CM

## 2021-12-30 DIAGNOSIS — E06.3 HYPOTHYROIDISM DUE TO HASHIMOTO'S THYROIDITIS: Primary | ICD-10-CM

## 2021-12-30 PROCEDURE — 99309 SBSQ NF CARE MODERATE MDM 30: CPT | Performed by: FAMILY MEDICINE

## 2022-01-27 ENCOUNTER — NURSING HOME VISIT (OUTPATIENT)
Dept: GERIATRICS | Facility: OTHER | Age: 87
End: 2022-01-27
Payer: COMMERCIAL

## 2022-01-27 DIAGNOSIS — E06.3 HYPOTHYROIDISM DUE TO HASHIMOTO'S THYROIDITIS: ICD-10-CM

## 2022-01-27 DIAGNOSIS — G30.1 LATE ONSET ALZHEIMER'S DISEASE WITHOUT BEHAVIORAL DISTURBANCE (HCC): ICD-10-CM

## 2022-01-27 DIAGNOSIS — I10 ESSENTIAL HYPERTENSION: ICD-10-CM

## 2022-01-27 DIAGNOSIS — F02.80 LATE ONSET ALZHEIMER'S DISEASE WITHOUT BEHAVIORAL DISTURBANCE (HCC): ICD-10-CM

## 2022-01-27 DIAGNOSIS — E11.9 TYPE 2 DIABETES MELLITUS WITHOUT COMPLICATION, WITHOUT LONG-TERM CURRENT USE OF INSULIN (HCC): Primary | ICD-10-CM

## 2022-01-27 DIAGNOSIS — E03.8 HYPOTHYROIDISM DUE TO HASHIMOTO'S THYROIDITIS: ICD-10-CM

## 2022-01-27 PROCEDURE — 99309 SBSQ NF CARE MODERATE MDM 30: CPT | Performed by: FAMILY MEDICINE

## 2022-01-27 NOTE — ASSESSMENT & PLAN NOTE
Last hemoglobin A1c done early this month, blood sugar overall acceptable considering patient's age and dementia  Will continue with same dose of insulin and monitoring

## 2022-01-27 NOTE — PROGRESS NOTES
[facility]  [address]  [phone]  Facility: [facility]/          NAME: Willis Miguel  AGE: 80 y o  SEX: female    DATE OF ENCOUNTER: 1/27/2022    Chief Complaint     "I am fine"    History of Present Illness     HPI    The following portions of the patient's history were reviewed and updated as appropriate (from facility chart and hospital records): allergies, current medications, past family history, past medical history, past social history, past surgical history and problem list   Patient was seen and examined for follow-up on diabetes, hypothyroidism, dementia, hypertension, and recent COVID-19 infection  Patient was rapid COVID test positive on January 12 and she was recovered on January 24th  Continues with nursing home care  Weight has been stable  Blood sugar overall stable considering her age  Last routine labs done early this month  Walks using her walker  No behaviors  Blood pressure stable  Participates in activities in the facility  Overall stable  No significant issues or concerns  Her TSH was at higher side on this months blood workup  Review of Systems     Review of Systems   Unable to perform ROS: Dementia (But overall denies having any complaint)       Active Problem List     Patient Active Problem List   Diagnosis    Type 2 diabetes mellitus without complication, without long-term current use of insulin ([address]    Mixed hyperlipidemia    Late onset Alzheimer's disease without behavioral disturbance ([facility] [address] )    Essential hypertension    Hypothyroidism due to Hashimoto's thyroiditis    Constipation    Failure to thrive in adult    Ambulatory dysfunction    Gastroesophageal reflux disease without esophagitis       Objective     Vitals:  Reviewed    Physical Exam  Vitals and nursing note reviewed  Constitutional:       General: She is not in acute distress  Appearance: Normal appearance  She is well-developed   She is obese  She is not ill-appearing, toxic-appearing or diaphoretic  HENT:      Head: Normocephalic and atraumatic  Right Ear: External ear normal       Left Ear: External ear normal       Ears:      Comments: [place]     Nose: Nose normal  No congestion or rhinorrhea  Mouth/Throat:      Comments: Missing teeth  Eyes:      General: No scleral icterus  Right eye: No discharge  Left eye: No discharge  Extraocular Movements: Extraocular movements intact  Conjunctiva/sclera: Conjunctivae normal       Pupils: Pupils are equal, round, and reactive to light  Cardiovascular:      Rate and Rhythm: Normal rate and regular rhythm  Heart sounds: Normal heart sounds  No murmur heard  No friction rub  No gallop  Pulmonary:      Effort: Pulmonary effort is normal  No respiratory distress  Breath sounds: Normal breath sounds  No stridor  No wheezing, rhonchi or rales  Chest:      Chest wall: No tenderness  Abdominal:      General: Bowel sounds are normal  There is no distension  Palpations: Abdomen is soft  There is no mass  Tenderness: There is no abdominal tenderness  There is no guarding or rebound  Hernia: No hernia is present  Genitourinary:     Comments: Deferred  Musculoskeletal:         General: No swelling, tenderness, deformity or signs of injury  Cervical back: Normal range of motion and neck supple  No rigidity or tenderness  Right lower leg: Edema present  Left lower leg: Edema present  Comments: Sitting in a chair, moves all extremities  Lymphadenopathy:      Cervical: No cervical adenopathy  Skin:     General: Skin is warm and dry  Coloration: Skin is not jaundiced or pale  Findings: No bruising, erythema, lesion or rash  Comments: Didn't examine sacral area or feet   Neurological:      Mental Status: She is alert  Cranial Nerves: Cranial nerve deficit ([place]) present        Comments: Limited due to patient's dementia, follows simple commands  Psychiatric:         Behavior: Behavior normal          Pertinent Laboratory/Diagnostic Studies:  1/6:  CBC, CMP, hemoglobin A1c, TSH, magnesium, B12, folate    Current Medications   Medication list in facility chart was reviewed and no changes made  Assessment and Plan     Type 2 diabetes mellitus without complication, without long-term current use of insulin ([facility])  Last hemoglobin A1c done early this month, blood sugar overall acceptable considering patient's age and dementia  Will continue with same dose of insulin and monitoring  Essential hypertension  Labs early this month stable, on amlodipine and lisinopril  Will continue with monitoring  Blood pressure stable  Late onset Alzheimer's disease without behavioral disturbance ([facility] [address] )  No behavior  Off divalproex  Will continue with close monitoring  Hypothyroidism due to Hashimoto's thyroiditis  TSH at 6 on  this month's labs, on levothyroxine  Will continue with monitoring        Kai Sullivan MD  4/00/27332:53 PM

## 2022-01-27 NOTE — ASSESSMENT & PLAN NOTE
Labs early this month stable, on amlodipine and lisinopril  Will continue with monitoring  Blood pressure stable

## 2022-02-24 ENCOUNTER — NURSING HOME VISIT (OUTPATIENT)
Dept: GERIATRICS | Facility: OTHER | Age: 87
End: 2022-02-24
Payer: COMMERCIAL

## 2022-02-24 DIAGNOSIS — E03.8 HYPOTHYROIDISM DUE TO HASHIMOTO'S THYROIDITIS: Primary | ICD-10-CM

## 2022-02-24 DIAGNOSIS — E06.3 HYPOTHYROIDISM DUE TO HASHIMOTO'S THYROIDITIS: Primary | ICD-10-CM

## 2022-02-24 DIAGNOSIS — F02.80 LATE ONSET ALZHEIMER'S DISEASE WITHOUT BEHAVIORAL DISTURBANCE (HCC): ICD-10-CM

## 2022-02-24 DIAGNOSIS — I10 ESSENTIAL HYPERTENSION: ICD-10-CM

## 2022-02-24 DIAGNOSIS — E11.9 TYPE 2 DIABETES MELLITUS WITHOUT COMPLICATION, WITHOUT LONG-TERM CURRENT USE OF INSULIN (HCC): ICD-10-CM

## 2022-02-24 DIAGNOSIS — G30.1 LATE ONSET ALZHEIMER'S DISEASE WITHOUT BEHAVIORAL DISTURBANCE (HCC): ICD-10-CM

## 2022-02-24 PROCEDURE — 99309 SBSQ NF CARE MODERATE MDM 30: CPT | Performed by: FAMILY MEDICINE

## 2022-02-24 NOTE — PROGRESS NOTES
[facility]  [address]  [phone]  Facility: [facility]/          NAME: Lyla Lowery  AGE: 80 y o  SEX: female    DATE OF ENCOUNTER: 2/24/2022    Chief Complaint     Pt has no complaint     History of Present Illness     HPI    The following portions of the patient's history were reviewed and updated as appropriate (from facility chart and hospital records): allergies, current medications, past family history, past medical history, past social history, past surgical history and problem list  Pt was seen and examined for f/u on HTN, DM, Dementia, and hypothyroidism  Last TSH done this month and WNL  BP, and wt has been stable  Pt walks using her walker  Overall stable  No specific issues or concerns  BS overall stable  No other specific issues or concerns  Review of Systems     Review of Systems   Unable to perform ROS: Dementia   Musculoskeletal:        Denies having any pain       Active Problem List     Patient Active Problem List   Diagnosis    Type 2 diabetes mellitus without complication, without long-term current use of insulin ([facility] [address]    Mixed hyperlipidemia    Late onset Alzheimer's disease without behavioral disturbance ([facility] [address] )    Essential hypertension    Hypothyroidism due to Hashimoto's thyroiditis    Constipation    Failure to thrive in adult    Ambulatory dysfunction    Gastroesophageal reflux disease without esophagitis       Objective     Vitals: reviewed     Physical Exam  Vitals and nursing note reviewed  Constitutional:       General: She is not in acute distress  Appearance: Normal appearance  She is well-developed  She is not ill-appearing, toxic-appearing or diaphoretic  HENT:      Head: Normocephalic and atraumatic  Right Ear: External ear normal       Left Ear: External ear normal       Ears:      Comments: [place]     Nose: No congestion or rhinorrhea        Mouth/Throat:      Comments: Missing teeth   Eyes: General: No scleral icterus  Right eye: No discharge  Left eye: No discharge  Extraocular Movements: Extraocular movements intact  Conjunctiva/sclera: Conjunctivae normal       Pupils: Pupils are equal, round, and reactive to light  Cardiovascular:      Rate and Rhythm: Normal rate and regular rhythm  Heart sounds: Murmur (systolic ) heard  No friction rub  No gallop  Pulmonary:      Effort: Pulmonary effort is normal  No respiratory distress  Breath sounds: Normal breath sounds  No stridor  No wheezing, rhonchi or rales  Chest:      Chest wall: No tenderness  Abdominal:      General: Bowel sounds are normal  There is no distension  Palpations: Abdomen is soft  There is no mass  Tenderness: There is no abdominal tenderness  There is no guarding or rebound  Hernia: No hernia is present  Genitourinary:     Comments: Deferred  Musculoskeletal:         General: No swelling, tenderness, deformity or signs of injury  Cervical back: Normal range of motion and neck supple  No rigidity or tenderness  Right lower leg: Edema present  Left lower leg: Edema present  Comments: Sitting in a chair, moves all extremities    Lymphadenopathy:      Cervical: No cervical adenopathy  Skin:     General: Skin is warm and dry  Coloration: Skin is not jaundiced or pale  Findings: Erythema present  No bruising, lesion or rash  Comments: Didn't examine sacral area  Neurological:      Mental Status: She is alert  Cranial Nerves: Cranial nerve deficit ([place]) present  Comments: Forgetful  Follows simple commands    Psychiatric:         Behavior: Behavior normal          Pertinent Laboratory/Diagnostic Studies:  2/3: TSH, 1/6: all routine labs     Current Medications   Medication list in facility chart was reviewed and no changes made  Assessment and Plan   Hypothyroidism due to Hashimoto's thyroiditis  On Levothyroxine   TSH early this month WNL  Will continue with monitoring  Type 2 diabetes mellitus without complication, without long-term current use of insulin (HCC)    Lab Results   Component Value Date    HGBA1C 7 1 (H) 04/08/2020   overall acceptable, and stable with Lantus  Will continue with monitoring  Essential hypertension  BP stable with Lisinopril and Amlodipine  Will continue with monitoring  Last lab in January stable  Late onset Alzheimer's disease without behavioral disturbance (HCC)  Wt stable  No behaviors  Will continue with monitoring       Ryan Peters MD  8/13/711825:81 PM

## 2022-02-25 NOTE — ASSESSMENT & PLAN NOTE
Lab Results   Component Value Date    HGBA1C 7 1 (H) 04/08/2020   overall acceptable, and stable with Lantus  Will continue with monitoring

## 2022-02-25 NOTE — ASSESSMENT & PLAN NOTE
BP stable with Lisinopril and Amlodipine  Will continue with monitoring  Last lab in January stable

## 2022-03-22 ENCOUNTER — NURSING HOME VISIT (OUTPATIENT)
Dept: GERIATRICS | Facility: OTHER | Age: 87
End: 2022-03-22
Payer: COMMERCIAL

## 2022-03-22 DIAGNOSIS — F02.80 LATE ONSET ALZHEIMER'S DISEASE WITHOUT BEHAVIORAL DISTURBANCE (HCC): ICD-10-CM

## 2022-03-22 DIAGNOSIS — E06.3 HYPOTHYROIDISM DUE TO HASHIMOTO'S THYROIDITIS: ICD-10-CM

## 2022-03-22 DIAGNOSIS — K21.9 GASTROESOPHAGEAL REFLUX DISEASE WITHOUT ESOPHAGITIS: ICD-10-CM

## 2022-03-22 DIAGNOSIS — I10 ESSENTIAL HYPERTENSION: ICD-10-CM

## 2022-03-22 DIAGNOSIS — G30.1 LATE ONSET ALZHEIMER'S DISEASE WITHOUT BEHAVIORAL DISTURBANCE (HCC): ICD-10-CM

## 2022-03-22 DIAGNOSIS — E03.8 HYPOTHYROIDISM DUE TO HASHIMOTO'S THYROIDITIS: ICD-10-CM

## 2022-03-22 DIAGNOSIS — E11.9 TYPE 2 DIABETES MELLITUS WITHOUT COMPLICATION, WITHOUT LONG-TERM CURRENT USE OF INSULIN (HCC): Primary | ICD-10-CM

## 2022-03-22 PROCEDURE — 99309 SBSQ NF CARE MODERATE MDM 30: CPT | Performed by: FAMILY MEDICINE

## 2022-03-22 NOTE — ASSESSMENT & PLAN NOTE
On Lisinopril, and Amlodipine, last routine labs in January and stable  BP stable  Will continue with monitoring

## 2022-03-22 NOTE — PROGRESS NOTES
[facility]  [address]  [phone]  Facility: [facility]/          NAME: Lyla Lowery  AGE: 80 y o  SEX: female    DATE OF ENCOUNTER: 3/22/2022    Chief Complaint     Pt has no specific compliant "I am fne!"    History of Present Illness     HPI    The following portions of the patient's history were reviewed and updated as appropriate (from facility chart and hospital records): allergies, current medications, past family history, past medical history, past social history, past surgical history and problem list  Pt was seen and examined for f/u on Dementia, Hypothyroidism, DM, HTN  Pt's wt has been stable  BS overall acceptable  BP stable  Continues with NH care  Participates in most of the unit's activities  Walks using her walker  No other specific issues or concerns  TSH WNL in February  Last routine lab done in January  Review of Systems     Review of Systems   Unable to perform ROS: Dementia   Constitutional:        But overall pt has no complaint "I am ok!"       Active Problem List     Patient Active Problem List   Diagnosis    Type 2 diabetes mellitus without complication, without long-term current use of insulin ([address] )    Mixed hyperlipidemia    Late onset Alzheimer's disease without behavioral disturbance ([address] )    Essential hypertension    Hypothyroidism due to Hashimoto's thyroiditis    Constipation    Failure to thrive in adult    Ambulatory dysfunction    Gastroesophageal reflux disease without esophagitis       Objective     Vitals: Reviewed     Physical Exam  Vitals and nursing note reviewed  Constitutional:       General: She is not in acute distress  Appearance: Normal appearance  She is well-developed  She is obese  She is not ill-appearing, toxic-appearing or diaphoretic  HENT:      Head: Normocephalic and atraumatic        Right Ear: External ear normal       Left Ear: External ear normal       Ears:      Comments: [place]: Nose normal  No congestion or rhinorrhea  Mouth/Throat:      Mouth: Mucous membranes are moist    Eyes:      General: No scleral icterus  Right eye: No discharge  Left eye: No discharge  Extraocular Movements: Extraocular movements intact  Conjunctiva/sclera: Conjunctivae normal       Pupils: Pupils are equal, round, and reactive to light  Cardiovascular:      Rate and Rhythm: Normal rate and regular rhythm  Heart sounds: Murmur (systolic ) heard  No friction rub  No gallop  Pulmonary:      Effort: Pulmonary effort is normal  No respiratory distress  Breath sounds: Normal breath sounds  No stridor  No wheezing, rhonchi or rales  Chest:      Chest wall: No tenderness  Abdominal:      General: Bowel sounds are normal  There is no distension  Palpations: Abdomen is soft  There is no mass  Tenderness: There is no abdominal tenderness  There is no guarding or rebound  Hernia: No hernia is present  Comments: Protuberant    Genitourinary:     Comments: Deferred  Musculoskeletal:         General: No swelling, tenderness, deformity or signs of injury  Normal range of motion  Cervical back: Normal range of motion and neck supple  No rigidity or tenderness  Right lower leg: Edema present  Left lower leg: No edema  Comments: Lying in bed, moves all extremities    Lymphadenopathy:      Cervical: No cervical adenopathy  Skin:     General: Skin is warm and dry  Coloration: Skin is not jaundiced or pale  Findings: No bruising, erythema, lesion or rash  Comments: Didn't examine sacral area  Neurological:      Mental Status: She is alert  Cranial Nerves: Cranial nerve deficit ([place]) present     Psychiatric:         Behavior: Behavior normal          Pertinent Laboratory/Diagnostic Studies:  2/3: TSH, January: A1c, CBC, CMP, folate, B12, TSH    Current Medications   Medication list in facility chart was reviewed and necessary changes made  Assessment and Plan   Type 2 diabetes mellitus without complication, without long-term current use of insulin (HCC)  Last HbA1C done in January, BS overall stable considering pt's age and dementia  On Lantus  Will continue with monitoring  Essential hypertension  On Lisinopril, and Amlodipine, last routine labs in January and stable  BP stable  Will continue with monitoring  Late onset Alzheimer's disease without behavioral disturbance ([facility] [address] )  No behaviors, wt stable, needs NH Care  Will continue with monitoring  Hypothyroidism due to Hashimoto's thyroiditis  On Levothyroxine, last TSH in January  Will continue with  Monitoring  Gastroesophageal reflux disease without esophagitis  Stable off PPI, asymptomatic  Will continue with monitoring         Elfego Peters MD  4/12/11512:73 PM

## 2022-03-22 NOTE — ASSESSMENT & PLAN NOTE
Last HbA1C done in January, BS overall stable considering pt's age and dementia  On Lantus  Will continue with monitoring

## 2022-04-26 ENCOUNTER — NURSING HOME VISIT (OUTPATIENT)
Dept: GERIATRICS | Facility: OTHER | Age: 87
End: 2022-04-26
Payer: COMMERCIAL

## 2022-04-26 DIAGNOSIS — E11.9 TYPE 2 DIABETES MELLITUS WITHOUT COMPLICATION, WITHOUT LONG-TERM CURRENT USE OF INSULIN (HCC): ICD-10-CM

## 2022-04-26 DIAGNOSIS — K21.9 GASTROESOPHAGEAL REFLUX DISEASE WITHOUT ESOPHAGITIS: ICD-10-CM

## 2022-04-26 DIAGNOSIS — E06.3 HYPOTHYROIDISM DUE TO HASHIMOTO'S THYROIDITIS: ICD-10-CM

## 2022-04-26 DIAGNOSIS — E03.8 HYPOTHYROIDISM DUE TO HASHIMOTO'S THYROIDITIS: ICD-10-CM

## 2022-04-26 DIAGNOSIS — G30.1 LATE ONSET ALZHEIMER'S DISEASE WITHOUT BEHAVIORAL DISTURBANCE (HCC): Primary | ICD-10-CM

## 2022-04-26 DIAGNOSIS — I10 ESSENTIAL HYPERTENSION: ICD-10-CM

## 2022-04-26 DIAGNOSIS — F02.80 LATE ONSET ALZHEIMER'S DISEASE WITHOUT BEHAVIORAL DISTURBANCE (HCC): Primary | ICD-10-CM

## 2022-04-26 PROCEDURE — 99309 SBSQ NF CARE MODERATE MDM 30: CPT | Performed by: FAMILY MEDICINE

## 2022-04-26 NOTE — ASSESSMENT & PLAN NOTE
Lab Results   Component Value Date    HGBA1C 7 1 (H) 04/08/2020   Ordered labs, blood sugar stable with Lantus considering age and dementia

## 2022-04-26 NOTE — PROGRESS NOTES
[facility]  [address]  [phone]  Facility: [place] [address]          NAME: Jhoan Maxwell  AGE: 80 y o  SEX: female    DATE OF ENCOUNTER: 4/26/2022    Chief Complaint     Patient has no specific complaint      History of Present Illness     HPI    The following portions of the patient's history were reviewed and updated as appropriate (from facility chart and hospital records): allergies, current medications, past family history, past medical history, past social history, past surgical history and problem list   Patient was seen and examined for follow-up on diabetes, hypertension, dementia, hypothyroidism, and GERD  Patient's weight has been stable  Continues with nursing home  Walks using her walker  Last labs done in January  No specific issues or concerns  Overall stable  Blood sugars stable  No behaviors  GERD symptoms stable  Review of Systems     Review of Systems   Unable to perform ROS: Dementia   Musculoskeletal:        Denies having any pain       Active Problem List     Patient Active Problem List   Diagnosis    Type 2 diabetes mellitus without complication, without long-term current use of insulin ([facility] [address] )    Mixed hyperlipidemia    Late onset Alzheimer's disease without behavioral disturbance ([facility] [address] )    Essential hypertension    Hypothyroidism due to Hashimoto's thyroiditis    Constipation    Failure to thrive in adult    Ambulatory dysfunction    Gastroesophageal reflux disease without esophagitis       Objective     Vitals:  Reviewed    Physical Exam  Vitals and nursing note reviewed  Constitutional:       General: She is not in acute distress  Appearance: She is well-developed  She is obese  She is not ill-appearing, toxic-appearing or diaphoretic  HENT:      Head: Normocephalic and atraumatic  Right Ear: External ear normal       Left Ear: External ear normal       Nose: Nose normal  No congestion or rhinorrhea  Mouth/Throat:      Mouth: Mucous membranes are moist    Eyes:      General: No scleral icterus  Right eye: No discharge  Left eye: No discharge  Extraocular Movements: Extraocular movements intact  Conjunctiva/sclera: Conjunctivae normal       Pupils: Pupils are equal, round, and reactive to light  Cardiovascular:      Rate and Rhythm: Normal rate and regular rhythm  Heart sounds: Normal heart sounds  No murmur heard  No friction rub  No gallop  Pulmonary:      Effort: Pulmonary effort is normal  No respiratory distress  Breath sounds: Normal breath sounds  No stridor  No wheezing, rhonchi or rales  Chest:      Chest wall: No tenderness  Abdominal:      General: Bowel sounds are normal  There is no distension  Palpations: Abdomen is soft  There is no mass  Tenderness: There is no abdominal tenderness  There is no guarding or rebound  Hernia: No hernia is present  Comments: Protuberant   Genitourinary:     Comments: Deferred  Musculoskeletal:         General: No swelling, tenderness, deformity or signs of injury  Cervical back: Normal range of motion and neck supple  No rigidity or tenderness  Right lower leg: No edema  Left lower leg: No edema  Comments: Sitting, limited range of motion  Lymphadenopathy:      Cervical: No cervical adenopathy  Skin:     General: Skin is warm and dry  Coloration: Skin is not jaundiced or pale  Findings: Erythema present  No bruising, lesion or rash  Comments: Didn't examine sacral area  Neurological:      Mental Status: She is alert  Cranial Nerves: Cranial nerve deficit ([place]) present  Comments: Not oriented, forgetful, follows commands     Psychiatric:         Behavior: Behavior normal          Pertinent Laboratory/Diagnostic Studies:  1/6: CBC, CMP, magnesium, TSH, A1c    Current Medications   Medication list in facility chart was reviewed and necessary changes made  Assessment and Plan     Late onset Alzheimer's disease without behavioral disturbance ([facility] [address] )  Weight stable, ordered labs  Continues with nursing home care  No behaviors  Essential hypertension  Blood pressure stable with amlodipine and lisinopril  Ordered labs  Will continue with monitoring  Hypothyroidism due to Hashimoto's thyroiditis  Last TSH in January, ordered labs  On levothyroxine  Will continue with monitoring  Gastroesophageal reflux disease without esophagitis  Stable PPI, will continue with monitoring  Type 2 diabetes mellitus without complication, without long-term current use of insulin ([facility])    Lab Results   Component Value Date    HGBA1C 7 1 (H) 04/08/2020   Ordered labs, blood sugar stable with Lantus considering age and dementia      Deedee Singh MD  2/85/83207:46 PM

## 2022-05-17 ENCOUNTER — NURSING HOME VISIT (OUTPATIENT)
Dept: GERIATRICS | Facility: OTHER | Age: 87
End: 2022-05-17
Payer: COMMERCIAL

## 2022-05-17 DIAGNOSIS — R05.9 COUGH: Primary | ICD-10-CM

## 2022-05-17 PROCEDURE — 99308 SBSQ NF CARE LOW MDM 20: CPT | Performed by: FAMILY MEDICINE

## 2022-05-17 NOTE — PROGRESS NOTES
[facility]  [address]  [phone]  Facility: [facility]/          NAME: Lucian Quigley  AGE: 80 y o  SEX: female    DATE OF ENCOUNTER: 5/17/2022    Chief Complaint     "I am fine'      History of Present Illness     HPI    The following portions of the patient's history were reviewed and updated as appropriate (from facility chart and hospital records): allergies, current medications, past family history, past medical history, past social history, past surgical history and problem list   Patient was seen and examined per staff request and report of her having some cough, and overall not feeling well  She had 25 percent breakfast completion this morning  After asking her she stated that she has a little bit of sore throat and cough  Patient is afebrile  The source    Review of Systems     Review of Systems   Unable to perform ROS: Dementia   Constitutional:        "I am fine"   HENT: Positive for sore throat (" a little! ")  Respiratory: Positive for cough ("a little ")  Active Problem List     Patient Active Problem List   Diagnosis    Type 2 diabetes mellitus without complication, without long-term current use of insulin ([address] )    Mixed hyperlipidemia    Late onset Alzheimer's disease without behavioral disturbance ([address] )    Essential hypertension    Hypothyroidism due to Hashimoto's thyroiditis    Constipation    Failure to thrive in adult    Ambulatory dysfunction    Gastroesophageal reflux disease without esophagitis    Cough       Objective     Vitals: Reviewed     Physical Exam  Vitals and nursing note reviewed  Constitutional:       General: She is not in acute distress  Appearance: She is well-developed  She is obese  She is not ill-appearing, toxic-appearing or diaphoretic  HENT:      Head: Normocephalic and atraumatic        Right Ear: External ear normal       Left Ear: External ear normal       Ears:      Comments: [facility] Nose: Congestion present  Comments: Coarse and nasal voice     Mouth/Throat:      Mouth: Mucous membranes are moist    Eyes:      General: No scleral icterus  Right eye: No discharge  Left eye: No discharge  Extraocular Movements: Extraocular movements intact  Conjunctiva/sclera: Conjunctivae normal       Pupils: Pupils are equal, round, and reactive to light  Cardiovascular:      Rate and Rhythm: Normal rate and regular rhythm  Heart sounds: Murmur heard  No friction rub  No gallop  Pulmonary:      Effort: Pulmonary effort is normal  No respiratory distress  Breath sounds: No stridor  No wheezing, rhonchi or rales  Comments: Overall diminished breathing sounds as patient is not taking deep breath  Chest:      Chest wall: No tenderness  Abdominal:      General: Bowel sounds are normal  There is no distension  Palpations: Abdomen is soft  There is no mass  Tenderness: There is no abdominal tenderness  There is no guarding or rebound  Hernia: No hernia is present  Genitourinary:     Comments: Deferred  Musculoskeletal:         General: No swelling, tenderness, deformity or signs of injury  Cervical back: Normal range of motion and neck supple  No rigidity or tenderness  Right lower leg: Edema present  Left lower leg: Edema present  Comments: Sitting in a chair, limited range of motion  Lymphadenopathy:      Cervical: No cervical adenopathy  Skin:     General: Skin is warm and dry  Coloration: Skin is not jaundiced or pale  Findings: Erythema present  No bruising, lesion or rash  Comments: Didn't examine sacral area  Neurological:      Cranial Nerves: Cranial nerve deficit ([place]) present  Comments: Limited with patient's dementia, follows commands     Psychiatric:         Behavior: Behavior normal          Pertinent Laboratory/Diagnostic Studies:  No lab for this visit     Current Medications   Medication list in facility chart was reviewed and necessary changes made  Assessment and Plan   Cough  Added sugar free cough syrup, vitals Q shift, will monitor closely      Yamel Demarco MD  0/37/49707:10 PM

## 2022-05-26 ENCOUNTER — NURSING HOME VISIT (OUTPATIENT)
Dept: GERIATRICS | Facility: OTHER | Age: 87
End: 2022-05-26
Payer: COMMERCIAL

## 2022-05-26 DIAGNOSIS — R05.9 COUGH: ICD-10-CM

## 2022-05-26 DIAGNOSIS — G30.1 LATE ONSET ALZHEIMER'S DISEASE WITHOUT BEHAVIORAL DISTURBANCE (HCC): ICD-10-CM

## 2022-05-26 DIAGNOSIS — F02.80 LATE ONSET ALZHEIMER'S DISEASE WITHOUT BEHAVIORAL DISTURBANCE (HCC): ICD-10-CM

## 2022-05-26 DIAGNOSIS — E11.9 TYPE 2 DIABETES MELLITUS WITHOUT COMPLICATION, WITHOUT LONG-TERM CURRENT USE OF INSULIN (HCC): ICD-10-CM

## 2022-05-26 DIAGNOSIS — I10 ESSENTIAL HYPERTENSION: Primary | ICD-10-CM

## 2022-05-26 DIAGNOSIS — E06.3 HYPOTHYROIDISM DUE TO HASHIMOTO'S THYROIDITIS: ICD-10-CM

## 2022-05-26 DIAGNOSIS — K21.9 GASTROESOPHAGEAL REFLUX DISEASE WITHOUT ESOPHAGITIS: ICD-10-CM

## 2022-05-26 DIAGNOSIS — E03.8 HYPOTHYROIDISM DUE TO HASHIMOTO'S THYROIDITIS: ICD-10-CM

## 2022-05-26 PROCEDURE — 99309 SBSQ NF CARE MODERATE MDM 30: CPT | Performed by: FAMILY MEDICINE

## 2022-05-26 NOTE — PROGRESS NOTES
[facility]  [address]  [phone]  Facility: [facility]/          NAME: Leena Anderson  AGE: 80 y o  SEX: female    DATE OF ENCOUNTER: 5/26/2022    Chief Complaint         History of Present Illness     HPI    The following portions of the patient's history were reviewed and updated as appropriate (from facility chart and hospital records): allergies, current medications, past family history, past medical history, past social history, past surgical history and problem list  Pt was seen and examined for f/u on GERD, DM, Dementia, Hypothyroidism, and HTN  Pt's BP has been stable  BS overall acceptable  Continues with NH care  Has been slowly loosing wt  Meal has been variable  Walks using her walker in the unit  Pt 's cough has been resolved  GERD  Has been off PPI, and asymptomatic  BP stable  Review of Systems     Review of Systems   Unable to perform ROS: Dementia   Musculoskeletal:        Denies having any pain    Skin: Negative  Active Problem List     Patient Active Problem List   Diagnosis    Type 2 diabetes mellitus without complication, without long-term current use of insulin ([facility] [address] )    Mixed hyperlipidemia    Late onset Alzheimer's disease without behavioral disturbance ([facility] [address] )    Essential hypertension    Hypothyroidism due to Hashimoto's thyroiditis    Constipation    Failure to thrive in adult    Ambulatory dysfunction    Gastroesophageal reflux disease without esophagitis    Cough       Objective     Vitals: Reviewed     Physical Exam  Vitals reviewed  Constitutional:       General: She is not in acute distress  Appearance: She is well-developed  She is obese  She is not ill-appearing, toxic-appearing or diaphoretic  HENT:      Head: Normocephalic and atraumatic  Right Ear: External ear normal       Left Ear: External ear normal       Nose: Nose normal  No congestion or rhinorrhea        Mouth/Throat:      Mouth: Mucous membranes are moist       Comments: Missing teeth   Eyes:      General: No scleral icterus  Right eye: No discharge  Left eye: No discharge  Extraocular Movements: Extraocular movements intact  Conjunctiva/sclera: Conjunctivae normal       Pupils: Pupils are equal, round, and reactive to light  Cardiovascular:      Rate and Rhythm: Normal rate and regular rhythm  Heart sounds: Normal heart sounds  No murmur heard  No friction rub  No gallop  Pulmonary:      Effort: Pulmonary effort is normal  No respiratory distress  Breath sounds: Normal breath sounds  No stridor  No wheezing, rhonchi or rales  Chest:      Chest wall: No tenderness  Abdominal:      General: Bowel sounds are normal  There is no distension  Palpations: Abdomen is soft  There is no mass  Tenderness: There is no abdominal tenderness  There is no guarding or rebound  Hernia: No hernia is present  Comments: Protuberant    Genitourinary:     Comments: Deferred  Musculoskeletal:         General: No swelling, tenderness, deformity or signs of injury  Cervical back: Normal range of motion and neck supple  No rigidity or tenderness  Right lower leg: No edema (trace if any )  Left lower leg: No edema (trace if any )  Comments: Sitting in a chair moving all extremities    Lymphadenopathy:      Cervical: No cervical adenopathy  Skin:     General: Skin is warm and dry  Coloration: Skin is not jaundiced or pale  Findings: No bruising, erythema, lesion or rash  Comments: Didn't examine sacral area  Neurological:      Mental Status: She is alert  Cranial Nerves: Cranial nerve deficit present  Psychiatric:         Behavior: Behavior normal          Pertinent Laboratory/Diagnostic Studies:  4/28: CBC, CMP,A1C:9 3 Feb: TSH    Current Medications   Medication list in facility chart was reviewed and no changes made         Assessment and Plan   Essential hypertension  BP stable with Amlodipine and Lisinopril  Last lab done in April  Will continue with monitoring  Type 2 diabetes mellitus without complication, without long-term current use of insulin (HCC)  BS overall stable and acceptable with Lantus, will continue with monitoring  Hypothyroidism due to Hashimoto's thyroiditis  Last TSH done in February, on Levothyroxine  Will continue with monitoring  Late onset Alzheimer's disease without behavioral disturbance (HCC)  Gradual wt loss, meal completion variable  Will continue with monitoring  No behaviors, otherwise asymptomatic  Cough  Resolved  Gastroesophageal reflux disease without esophagitis  Asymptomatic off medication         Coty Meeks MD  3/75/11622:23 PM

## 2022-05-27 NOTE — ASSESSMENT & PLAN NOTE
Gradual wt loss, meal completion variable  Will continue with monitoring  No behaviors, otherwise asymptomatic

## 2022-06-30 ENCOUNTER — NURSING HOME VISIT (OUTPATIENT)
Dept: GERIATRICS | Facility: OTHER | Age: 87
End: 2022-06-30
Payer: COMMERCIAL

## 2022-06-30 DIAGNOSIS — G30.1 LATE ONSET ALZHEIMER'S DISEASE WITHOUT BEHAVIORAL DISTURBANCE (HCC): ICD-10-CM

## 2022-06-30 DIAGNOSIS — I10 ESSENTIAL HYPERTENSION: ICD-10-CM

## 2022-06-30 DIAGNOSIS — E03.8 HYPOTHYROIDISM DUE TO HASHIMOTO'S THYROIDITIS: ICD-10-CM

## 2022-06-30 DIAGNOSIS — E11.9 TYPE 2 DIABETES MELLITUS WITHOUT COMPLICATION, WITHOUT LONG-TERM CURRENT USE OF INSULIN (HCC): Primary | ICD-10-CM

## 2022-06-30 DIAGNOSIS — K21.9 GASTROESOPHAGEAL REFLUX DISEASE WITHOUT ESOPHAGITIS: ICD-10-CM

## 2022-06-30 DIAGNOSIS — F02.80 LATE ONSET ALZHEIMER'S DISEASE WITHOUT BEHAVIORAL DISTURBANCE (HCC): ICD-10-CM

## 2022-06-30 DIAGNOSIS — E06.3 HYPOTHYROIDISM DUE TO HASHIMOTO'S THYROIDITIS: ICD-10-CM

## 2022-06-30 PROCEDURE — 99309 SBSQ NF CARE MODERATE MDM 30: CPT | Performed by: FAMILY MEDICINE

## 2022-06-30 NOTE — PROGRESS NOTES
[facility]  [address]  [phone]  Facility: [place] [address]          NAME: J Luis Lo  AGE: 80 y o  SEX: female    DATE OF ENCOUNTER: 6/30/2022    Chief Complaint     Pt has no specific complaint     History of Present Illness     HPI    The following portions of the patient's history were reviewed and updated as appropriate (from facility chart and hospital records): allergies, current medications, past family history, past medical history, past social history, past surgical history and problem list  Pt was seen and examined for f/u on DM, HTN, Hypothyroidism, Dementia, and GERD  Pt's last routine lab done in April  Last TSH done in February  Continues with NH care  Per staff pt has had complaint of back pain in last few days but she gets better with Tylenol  No behaviors  Walks using her walker  BS stable  Participates in some of the unit activities  Pt has been loosing some wt but meal completion has been stable  Review of Systems     Review of Systems   Constitutional: Negative  HENT: Negative  Eyes: Negative  Respiratory: Negative  Cardiovascular: Negative  Gastrointestinal: Negative  Endocrine: Negative  Genitourinary: Negative  Musculoskeletal: Negative  Negative for back pain (I don't have back pain today )  Skin: Negative  Allergic/Immunologic: Negative  Neurological: Negative  Hematological: Negative  Psychiatric/Behavioral: Negative  All other systems reviewed and are negative        Active Problem List     Patient Active Problem List   Diagnosis    Type 2 diabetes mellitus without complication, without long-term current use of insulin ([address] )    Mixed hyperlipidemia    Late onset Alzheimer's disease without behavioral disturbance ([facility] [address] )    Essential hypertension    Hypothyroidism due to Hashimoto's thyroiditis    Constipation    Failure to thrive in adult    Ambulatory dysfunction    Gastroesophageal reflux disease without esophagitis    Cough       Objective     Vitals: Reviewed     Physical Exam  Vitals reviewed  Constitutional:       General: She is not in acute distress  Appearance: She is well-developed  She is ill-appearing  She is not toxic-appearing or diaphoretic  HENT:      Head: Normocephalic and atraumatic  Right Ear: External ear normal       Left Ear: External ear normal       Ears:      Comments: [place]     Nose: Nose normal  No congestion or rhinorrhea  Mouth/Throat:      Mouth: Mucous membranes are moist       Comments: Missing teeth   Eyes:      General: No scleral icterus  Right eye: No discharge  Left eye: No discharge  Extraocular Movements: Extraocular movements intact  Conjunctiva/sclera: Conjunctivae normal       Pupils: Pupils are equal, round, and reactive to light  Cardiovascular:      Rate and Rhythm: Normal rate and regular rhythm  Heart sounds: Normal heart sounds  No murmur heard  No friction rub  No gallop  Pulmonary:      Effort: Pulmonary effort is normal  No respiratory distress  Breath sounds: Normal breath sounds  No stridor  No wheezing, rhonchi or rales  Chest:      Chest wall: No tenderness  Abdominal:      General: Bowel sounds are normal  There is no distension  Palpations: Abdomen is soft  There is no mass  Tenderness: There is no abdominal tenderness  There is no guarding or rebound  Hernia: No hernia is present  Comments: Protuberant    Genitourinary:     Comments: Deferred  Musculoskeletal:         General: No swelling, tenderness, deformity or signs of injury  Cervical back: Normal range of motion and neck supple  No rigidity or tenderness  Right lower leg: No edema  Left lower leg: No edema  Comments: In bed, lying, moves extremities  Lymphadenopathy:      Cervical: No cervical adenopathy  Skin:     General: Skin is warm and dry        Coloration: Skin is not jaundiced or pale  Findings: No bruising, erythema, lesion or rash  Comments: Didn't examine sacral area  Neurological:      Mental Status: She is alert  Cranial Nerves: Cranial nerve deficit ([place]) present  Comments: Forgetful, follows commands  Psychiatric:         Behavior: Behavior normal          Pertinent Laboratory/Diagnostic Studies:  4/28: CBC, CMP, A1c, 2/3: TSH    Current Medications   Medication list in facility chart was reviewed and no changes made  Assessment and Plan   Type 2 diabetes mellitus without complication, without long-term current use of insulin ([facility])    Lab Results   Component Value Date    HGBA1C 7 1 (H) 04/08/2020   BS stable with Lantus  will continue with monitoring  Stable  Hypothyroidism due to Hashimoto's thyroiditis  On Levothyroxine, last TSH in February WNL  Will continue with monitoring  Essential hypertension  BP stable with Lisinopril, and Amlodipine  Will continue with monitoring  Last lab done in April  Late onset Alzheimer's disease without behavioral disturbance ([facility] [address] )  Last lab done in April  No behaviors  Continues with NH care  Some wt loss  Meal completion  Gastroesophageal reflux disease without esophagitis  Stable off medication       Yamel Desai MD  8/80/78148:12 PM

## 2022-06-30 NOTE — ASSESSMENT & PLAN NOTE
Lab Results   Component Value Date    HGBA1C 7 1 (H) 04/08/2020   BS stable with Lantus  will continue with monitoring  Stable

## 2022-07-26 ENCOUNTER — NURSING HOME VISIT (OUTPATIENT)
Dept: GERIATRICS | Facility: OTHER | Age: 87
End: 2022-07-26
Payer: COMMERCIAL

## 2022-07-26 DIAGNOSIS — I10 ESSENTIAL HYPERTENSION: ICD-10-CM

## 2022-07-26 DIAGNOSIS — K59.00 CONSTIPATION, UNSPECIFIED CONSTIPATION TYPE: ICD-10-CM

## 2022-07-26 DIAGNOSIS — E78.2 MIXED HYPERLIPIDEMIA: Primary | ICD-10-CM

## 2022-07-26 DIAGNOSIS — E11.9 TYPE 2 DIABETES MELLITUS WITHOUT COMPLICATION, WITHOUT LONG-TERM CURRENT USE OF INSULIN (HCC): ICD-10-CM

## 2022-07-26 PROCEDURE — 99309 SBSQ NF CARE MODERATE MDM 30: CPT | Performed by: INTERNAL MEDICINE

## 2022-07-26 RX ORDER — LISINOPRIL 2.5 MG/1
2.5 TABLET ORAL DAILY
COMMUNITY

## 2022-07-26 RX ORDER — LEVOTHYROXINE SODIUM 0.1 MG/1
100 TABLET ORAL DAILY
COMMUNITY
End: 2022-10-24 | Stop reason: ALTCHOICE

## 2022-07-26 RX ORDER — INSULIN GLARGINE 100 [IU]/ML
7 INJECTION, SOLUTION SUBCUTANEOUS
COMMUNITY

## 2022-07-26 RX ORDER — AMLODIPINE BESYLATE 5 MG/1
2.5 TABLET ORAL DAILY
COMMUNITY

## 2022-07-26 NOTE — ASSESSMENT & PLAN NOTE
Lab Results   Component Value Date    HGBA1C 9 3 (H) 04/28/2022     Last hbA1c elevated   But reviewing recent blood sugars does not look bad will get a repeat hbA1c next week  Continue current meds for now  Continue monitoring blood sugars

## 2022-07-26 NOTE — PROGRESS NOTES
[address] home notes  LONG TERM CARE       NAME: Jeannine White  AGE: 80 y o  SEX: female    DATE OF ENCOUNTER: 2022    Assessment and Plan   Type 2 diabetes mellitus without complication, without long-term current use of insulin (HCC)    Lab Results   Component Value Date    HGBA1C 9 3 (H) 2022     Last hbA1c elevated   But reviewing recent blood sugars does not look bad will get a repeat hbA1c next week  Continue current meds for now  Continue monitoring blood sugars     Mixed hyperlipidemia  Not on meds  Will get lipid panel also since diabetic     Essential hypertension  BP in good range  Continue current meds  Continue monitoring BP      Chief Complaint     No new complaints     History of Present Illness     81 yo female seen for monthly visit and med renewal  Patient seen for DMII, HLD and HTN  Reviewed nursing notes since last visit  PMHx     Past Medical History:   Diagnosis Date    [facility] ([facility] )     COVID-19 2020    Depression     Ground glass opacity present on imaging of lung 2020    HLD (hyperlipidemia)     Hypothyroid     Low O2 saturation 2020    Neurocognitive disorder     T2DM (type 2 diabetes mellitus) ([facility] )      Past Surgical History:   Procedure Laterality Date     SECTION      HYSTERECTOMY       Family History   Problem Relation Age of Onset    Stomach cancer Mother     No Known Problems Father      Social History     Socioeconomic History    Marital status:       Spouse name: Not on file    Number of children: Not on file    Years of education: Not on file    Highest education level: Not on file   Occupational History    Not on file   Tobacco Use    Smoking status: Never Smoker    Smokeless tobacco: Never Used   Vaping Use    Vaping Use: Never used   Substance and Sexual Activity    Alcohol use: Not Currently    Drug use: Not Currently    Sexual activity: Not Currently   Other Topics Concern  Not on file   Social History Narrative    Not on file     Social Determinants of Health     Financial Resource Strain: Not on file   Food Insecurity: Not on file   Transportation Needs: Not on file   Physical Activity: Not on file   Stress: Not on file   Social Connections: Not on file   Intimate Partner Violence: Not on file   Housing Stability: Not on file     Allergies   Allergen Reactions    Dairy Aid [Lactase]     Other      All dairy products        Review of Systems     Denies any pain or shortness of breath  All other review of system negative      Objective   Vital signs:  BP: 137/67  HR: 95  RR: 18  TEMP: 97 8F    PHYSICAL EXAM:  GENERAL: no acute distress, chronically ill appearing   SKIN: warm, dry, no rash, no cyanosis  HEENT: normocephalic, atraumatic, no JVD, no Thyromegaly, no lymphadenopathy, hard of hearing   LUNGS: CTA, no wheezing, no rales, expanded equally, no chest tenderness   HEART: normal rhythm, normal rate, no murmur, no gallop  ABDOMEN: soft non tender non distended bs+, no guarding or rebound tenderness  :  no suprapubic tenderness  MUSCULOSKELETAL: strength about 4+/5 all extremities, no calf tenderness  NEUROLOGY: awake, alert, CN2-12 intact  PSYCH: cooperative, pleasant, impaired memory      Pertinent Laboratory/Diagnostic Studies:  Recent labs and diagnostic tests reviewed in nursing home EMR    Current Medications   Medications reviewed and signed off on nursing home EMR          Boom Corona MD

## 2022-08-24 ENCOUNTER — NURSING HOME VISIT (OUTPATIENT)
Dept: GERIATRICS | Facility: OTHER | Age: 87
End: 2022-08-24
Payer: COMMERCIAL

## 2022-08-24 DIAGNOSIS — G30.1 LATE ONSET ALZHEIMER'S DISEASE WITHOUT BEHAVIORAL DISTURBANCE (HCC): Primary | ICD-10-CM

## 2022-08-24 DIAGNOSIS — E11.9 TYPE 2 DIABETES MELLITUS WITHOUT COMPLICATION, WITHOUT LONG-TERM CURRENT USE OF INSULIN (HCC): ICD-10-CM

## 2022-08-24 DIAGNOSIS — E03.8 HYPOTHYROIDISM DUE TO HASHIMOTO'S THYROIDITIS: ICD-10-CM

## 2022-08-24 DIAGNOSIS — F02.80 LATE ONSET ALZHEIMER'S DISEASE WITHOUT BEHAVIORAL DISTURBANCE (HCC): Primary | ICD-10-CM

## 2022-08-24 DIAGNOSIS — I10 ESSENTIAL HYPERTENSION: ICD-10-CM

## 2022-08-24 DIAGNOSIS — E06.3 HYPOTHYROIDISM DUE TO HASHIMOTO'S THYROIDITIS: ICD-10-CM

## 2022-08-24 PROCEDURE — 99309 SBSQ NF CARE MODERATE MDM 30: CPT | Performed by: NURSE PRACTITIONER

## 2022-08-25 NOTE — PROGRESS NOTES
[address], [address], [facility], [address] [place] [address]  [phone]    NAME: Garo Serrano  AGE: 80 y o  SEX: female    Progress Note    Location[place]  POS: 28 ([facility])    Assessment/Plan:    Late onset Alzheimer's disease without behavioral disturbance ([address] )  Stable  Continue LTCF supportive care  Very good appetite with stable weight  No anemia/renal/hepatic functions WNL  Lipid panel within normal limits  Continue fall precaution  Continue daily Vit D/ Vit B12/Folic acid/    Type 2 diabetes mellitus without complication, without long-term current use of insulin ([facility])    Lab Results   Component Value Date    HGBA1C 9 3 (H) 04/28/2022   Improved HbA1C: 8 2 (H: 8/28/2022)  FBG range (8/1-24/2022) = 132 to 181  Continue Lantus 8 units at HS    Essential hypertension  Stable  BP range (8/1-24/2022) = 103/57 to 155/83  ** 1 episode of SBP > 140 on this period  Currently on: Lisinopril 2 5mg daily/ Amlodipine 5mg daily  Reduce Amlodipine to 2 5mg daily    Hypothyroidism due to Hashimoto's thyroiditis  Stable TSH: 3 18 (WNL: 2/3/2022)  Currently on Levothyroxine 100mcg daily  TSH witi reflex Free T4 on 8/30/2022      Chief complaint / Reason for visit: Follow-up visit    History of Present Illness: This is an 19-year-old female patient admitted at [facility] for debility her patient is seen and examined today to follow up acute and chronic medical conditions: Late onset Alzheimer's disease, DM Type 2, HTN and Hypothyroidism  Patient is out of bed for this visit sitting in the common room - alert, cooperative, calm, very pleasant and not in distress  Patient is verbal with clear coherent speech - oriented to name and birthday patient is actively ambulatory using walker without difficulty  Patient acknowledged feeling well on this visit - denies any acute medical concerns during ROS assessment including pain, " I'm good"    Per nursing, no acute medical concerns for this visit - described as stable and at baseline  Review of Systems:  Per history of present illness, all other systems reviewed and negative  HISTORY:  Medical Hx: Reviewed, unchanged  Family Hx: Reviewed, unchanged  Soc Hx: Reviewed,  unchanged    ALLERGY: Reviewed, unchanged  Allergies   Allergen Reactions    Dairy Aid [Lactase]     Other      All dairy products         PHYSICAL EXAM:  Vital Signs:  T98 6F -P95 -R17 BP: 132/68 SpO2: 97% RA  Weight:  151 0 lbs (8/23/2022) <=  155 1 lbs (7/26/2022) <= 154 8 lbs (6/21/2022)    General: NAD  Head: Atraumatic  Normocephalic  Ear: [place]  Eye Exam: anicteric sclera, no discharge, PERRLA, No injection  Oral Exam: moist mucous membranes, no buccaloropharyngeal erythema, palatine tonsils WNL  Neck Exam: no anterior cervical lymphadenopathy noted, neck supple  Cardiovascular: regular rate, regular rhythm, no murmurs, rubs, or gallops  Pulmonary: no wheeze, no rhonchi, no rales  No chest tenderness  Abdominal: soft, non-tender, nondistended, bowel sounds audible x 4 quadrants  Ave meal completion: %  : Non distended bladder  Continent  Daily BM  Extremities and skin: no edema noted, no rashes  Intact skin  Neurological: alert, cooperative and responsive, Oriented x 2, moving all 4 extremities symmetrically  Ambulatory dysfunction - uses walker without difficulty  Laboratory results / Imaging reviewed: Hard copy/ies in medical chart:    * HbA1C (8/2/2022) = 8 2 (H)  <= 9 3 (H: 4/28/2022)    * Lipid Panel (8/2/2022):   CHOLESTEROL 175 mg/dL <200  Final         TRIGLYCERIDE 143 mg/dL <150  Final         CHOL, HDL, DIRECT 46 mg/dL >40  Final         CHOL, NON- mg/dL <160  Final   Note: For NCEP interpretive guidelines please refer to the Laboratory   Handbook       CHOL, LDL, CALC 100 mg/dL <130  Final   LDL Cholesterol was calculated using the Friedewald equation   Direct   measurement of LDL is not indicated for this patient based on [facility]'s   analytical algorithm for measurement of LDL Cholesterol       CHOL/HDL RATIO 3 80    Final       * CMP (4/28/2022):   GLUCOSE 187 mg/dL 65-99 H Final         BUN 13 mg/dL 7-25  Final         CREATININE 0 66 mg/dL 0 40-1 10  Final         SODIUM 137 mmol/L 135-145  Final         POTASSIUM 4 4 mmol/L 3 5-5 2  Final         CHLORIDE 102 mmol/L 100-109  Final         CARBON DIOXIDE 28 mmol/L 23-31  Final         CALCIUM 9 0 mg/dL 8 5-10 1  Final         ALKALINE PHOSPHATASE 109 U/L   Final         ALBUMIN 3 2 g/dL 3 5-4 8 L Final         BILIRUBIN,TOTAL 0 3 mg/dL 0 2-1 0  Final   Use of this assay is not recommended for patients undergoing treatment   with eltrombopag due to the potential for falsely elevated results       PROTEIN, TOTAL 6 6 g/dL 6 3-8 3  Final         AST 15 U/L <41  Final         ALT 23 U/L <56  Final         ANION GAP 7  3-11  Final         eGFRcr 85  >59  Final         eGFRcr COMMENT (Note)    Final       * CBC with diff (4/28/2022):   HEMOGLOBIN 13 1 g/dL 11 5-14 5  Final         HEMATOCRIT 38 8 % 35 0-43 0  Final         WBC 6 3 thou/cmm 4 0-10 0  Final         RBC 4 31 mill/cmm 3 70-4 70  Final         PLATELET COUNT 034 thou/cmm 140-350  Final         MPV 9 5 fL 7 5-11 3  Final         MCV 90 fL   Final         MCH 30 3 pg 26 0-34 0  Final         MCHC 33 7 g/dL 32 0-37 0  Final         RDW 14 4 % 12 0-16 0  Final         DIFFERENTIAL TYPE AUTO    Final         ABSOLUTE NEUT 4 1 thou/cmm 1 8-7 8  Final         ABSOLUTE LYMPH 1 3 thou/cmm 1 0-3 0  Final         ABSOLUTE MONO 0 6 thou/cmm 0 3-1 0  Final         ABSOLUTE EOS 0 3 thou/cmm 0 0-0 5  Final         ABSOLUTE BASO 0 0 thou/cmm 0 0-0 1  Final         NEUTROPHILS 63 %   Final         LYMPHOCYTES 21 %   Final         MONOCYTES 10 %   Final         EOSINOPHILS 5 %   Final         BASOPHILS 1 %   Final       * TSH (2/3/2022) = 3 18 (WNL)      Current Medications:   All medications reviewed and updated in Nursing Home Chart    Please note:  Voice-recognition software may have been used in the preparation of this document  Occasional wrong word or "sound-alike" substitutions may have occurred due to the inherent limitations of voice recognition software  Interpretation should be guided by context      AWA Iqbal  8/27/2022

## 2022-08-27 NOTE — ASSESSMENT & PLAN NOTE
Stable    BP range (8/1-24/2022) = 103/57 to 155/83  ** 1 episode of SBP > 140 on this period  Currently on: Lisinopril 2 5mg daily/ Amlodipine 5mg daily  Reduce Amlodipine to 2 5mg daily

## 2022-08-27 NOTE — ASSESSMENT & PLAN NOTE
Lab Results   Component Value Date    HGBA1C 9 3 (H) 04/28/2022   Improved HbA1C: 8 2 (H: 8/28/2022)  FBG range (8/1-24/2022) = 132 to 181  Continue Lantus 8 units at HS

## 2022-08-27 NOTE — ASSESSMENT & PLAN NOTE
Stable TSH: 3 18 (WNL: 2/3/2022)  Currently on Levothyroxine 100mcg daily  TSH witi reflex Free T4 on 8/30/2022

## 2022-08-27 NOTE — ASSESSMENT & PLAN NOTE
Stable  Continue LTCF supportive care  Very good appetite with stable weight  No anemia/renal/hepatic functions WNL  Lipid panel within normal limits  Continue fall precaution    Continue daily Vit D/ Vit B12/Folic acid/

## 2022-09-22 ENCOUNTER — NURSING HOME VISIT (OUTPATIENT)
Dept: GERIATRICS | Facility: OTHER | Age: 87
End: 2022-09-22
Payer: COMMERCIAL

## 2022-09-22 DIAGNOSIS — K59.00 CONSTIPATION, UNSPECIFIED CONSTIPATION TYPE: ICD-10-CM

## 2022-09-22 DIAGNOSIS — R26.2 AMBULATORY DYSFUNCTION: Primary | ICD-10-CM

## 2022-09-22 DIAGNOSIS — E78.2 MIXED HYPERLIPIDEMIA: ICD-10-CM

## 2022-09-22 DIAGNOSIS — K21.9 GASTROESOPHAGEAL REFLUX DISEASE WITHOUT ESOPHAGITIS: ICD-10-CM

## 2022-09-22 PROCEDURE — 99309 SBSQ NF CARE MODERATE MDM 30: CPT | Performed by: INTERNAL MEDICINE

## 2022-09-22 NOTE — ASSESSMENT & PLAN NOTE
Seen ambulating without any assistive devices in the room  Continue with safety measures  Continue monitoring   Activity as tolerable Airway patent, nasal mucosa clear, mouth with normal mucosa. Throat has no vesicles, no oropharyngeal exudates and uvula is midline.

## 2022-09-22 NOTE — PROGRESS NOTES
[address] home notes  LONG TERM CARE       NAME: Mason Romano  AGE: 80 y o  SEX: female    DATE OF ENCOUNTER: 2022    Assessment and Plan   Ambulatory dysfunction  Seen ambulating without any assistive devices in the room  Continue with safety measures  Continue monitoring   Activity as tolerable     Constipation  Seems to be stable  Continue current meds      Mixed hyperlipidemia  Lipid panel reviewed from Aug and good range      Gastroesophageal reflux disease without esophagitis  No complaints  And has been off meds  Continue monitoring symptoms       Chief Complaint     No new complaints    History of Present Illness     81 yo female seen for monthly visit and med renewal  Patient seen for ambulatory dysfunction, constipation, HLD and GERD  Reviewed nursing notes since last visit  PMHx     Past Medical History:   Diagnosis Date    [facility]     COVID-19 2020    Depression     Ground glass opacity present on imaging of lung 2020    HLD (hyperlipidemia)     Hypothyroid     Low O2 saturation 2020    Neurocognitive disorder     T2DM (type 2 diabetes mellitus) ([facility] [address] )      Past Surgical History:   Procedure Laterality Date     SECTION      HYSTERECTOMY       Family History   Problem Relation Age of Onset    Stomach cancer Mother     No Known Problems Father      Social History     Socioeconomic History    Marital status:       Spouse name: Not on file    Number of children: Not on file    Years of education: Not on file    Highest education level: Not on file   Occupational History    Not on file   Tobacco Use    Smoking status: Never Smoker    Smokeless tobacco: Never Used   Vaping Use    Vaping Use: Never used   Substance and Sexual Activity    Alcohol use: Not Currently    Drug use: Not Currently    Sexual activity: Not Currently   Other Topics Concern    Not on file   Social History Narrative    Not on file Social Determinants of Health     Financial Resource Strain: Not on file   Food Insecurity: Not on file   Transportation Needs: Not on file   Physical Activity: Not on file   Stress: Not on file   Social Connections: Not on file   Intimate Partner Violence: Not on file   Housing Stability: Not on file     Allergies   Allergen Reactions    Dairy Aid [Lactase]     Other      All dairy products        Review of Systems     Denies any pain or shortness of breath  All other review of system negative      Objective   Vital signs reviewed from facility records  PHYSICAL EXAM:  GENERAL: no acute distress  SKIN: warm, dry, no rash, no cyanosis  HEENT: normocephalic, atraumatic, no JVD, no Thyromegaly, no lymphadenopathy  LUNGS: CTA, no wheezing, no rales, expanded equally, no chest tenderness   HEART: normal rhythm, normal rate, no murmur, no gallop  ABDOMEN: soft non tender non distended bs+, no guarding or rebound tenderness  :  no suprapubic tenderness  MUSCULOSKELETAL: strength about 4+/5 all extremities, ROM within normal, no calf tenderness  NEUROLOGY: awake, alert, CN2-12 intact  PSYCH: cooperative, pleasant  Pertinent Laboratory/Diagnostic Studies:  Recent labs and diagnostic tests reviewed in nursing home EMR    Current Medications   Medications reviewed from nursing home EMR          Iban Dang MD

## 2022-10-11 PROBLEM — R05.9 COUGH: Status: RESOLVED | Noted: 2022-05-17 | Resolved: 2022-10-11

## 2022-10-21 ENCOUNTER — NURSING HOME VISIT (OUTPATIENT)
Dept: GERIATRICS | Facility: OTHER | Age: 87
End: 2022-10-21
Payer: COMMERCIAL

## 2022-10-21 DIAGNOSIS — G30.1 LATE ONSET ALZHEIMER'S DISEASE WITHOUT BEHAVIORAL DISTURBANCE (HCC): Primary | ICD-10-CM

## 2022-10-21 DIAGNOSIS — F02.80 LATE ONSET ALZHEIMER'S DISEASE WITHOUT BEHAVIORAL DISTURBANCE (HCC): Primary | ICD-10-CM

## 2022-10-21 DIAGNOSIS — E06.3 HYPOTHYROIDISM DUE TO HASHIMOTO'S THYROIDITIS: ICD-10-CM

## 2022-10-21 DIAGNOSIS — E03.8 HYPOTHYROIDISM DUE TO HASHIMOTO'S THYROIDITIS: ICD-10-CM

## 2022-10-21 DIAGNOSIS — I10 ESSENTIAL HYPERTENSION: ICD-10-CM

## 2022-10-21 DIAGNOSIS — E11.9 TYPE 2 DIABETES MELLITUS WITHOUT COMPLICATION, WITHOUT LONG-TERM CURRENT USE OF INSULIN (HCC): ICD-10-CM

## 2022-10-21 PROCEDURE — 99309 SBSQ NF CARE MODERATE MDM 30: CPT | Performed by: NURSE PRACTITIONER

## 2022-10-24 RX ORDER — LEVOTHYROXINE SODIUM 112 UG/1
112 TABLET ORAL DAILY
COMMUNITY

## 2022-10-24 NOTE — PROGRESS NOTES
[address], [address], [facility] [address] [facility] [place]  [phone]    NAME: Isa Ortiz  AGE: 80 y o  SEX: female    Progress Note    [place]  POS: 32 (LT)    Assessment/Plan:    Late onset Alzheimer's disease without behavioral disturbance (HCC)  Stable  Continue LTCF supportive care  Very good appetite: % with stable weight  Continue fall precaution      Type 2 diabetes mellitus without complication, without long-term current use of insulin (HCC)  Improved HGBA1C: 8 2 (H: 8/2/2022) <= 9 3 (H: 04/28/2022)  FBG range (10/1-21/2022) = 125 to 169  Continue Lantus 7 units at HS (last dose change: 8/2/2022)     Essential hypertension   Stable  BP range (10/1-21/2022) = 100/50 to 150/89  Continue Amlodipine to 2 5mg daily/ Lisinopril 2 5mg daily     Hypothyroidism due to Hashimoto's thyroiditis  Stable TSH: 6 96 (H: 10/4/2022) <= 3 18 (WNL: 2/3/2022)  Currently on Levothyroxine 112mcg daily (last doese change 9/7/2022)    Chief complaint / Reason for visit: Follow-up visit    History of Present Illness: This is an 20-year-old female patient admitted at [facility] for debility her patient is seen and examined today to follow up acute and chronic medical conditions: Late onset Alzheimer's disease, DM Type 2, HTN and Hypothyroidism      Patient is out of bed for this visit sitting in the common room - alert, cooperative, calm, very pleasant and not in distress  Patient is verbal with clear coherent speech - oriented to name and birthday only  Patient acknowledged feeling well on this visit, " I'm good" - denies any acute medical concerns during ROS assessment including pain  Per nursing, no acute medical concerns for this visit - described as stable and at baseline  Review of Systems:  Per history of present illness, all other systems reviewed and negative      HISTORY:  Medical Hx: Reviewed, unchanged  Family Hx: Reviewed, unchanged  Soc Hx: Reviewed,  unchanged    ALLERGY: Reviewed, unchanged  Allergies   Allergen Reactions   • Dairy Aid [Lactase]    • Other      All dairy products         PHYSICAL EXAM:  Vital Signs:  T97 8F -P66 -R18 BP: 136/71 SpO2: 100% RA  Weight:  154 4 lbs (10/8/2022) <= 154 2 lbs (9/9/2022) <= 154 3 lbs (8/9/2022)    General: NAD  Head: Atraumatic  Normocephalic  Ear: [place]  Eye Exam: anicteric sclera, no discharge, PERRLA, No injection  Oral Exam: moist mucous membranes, no buccaloropharyngeal erythema, palatine tonsils WNL  Neck Exam: no anterior cervical lymphadenopathy noted, neck supple  Cardiovascular: regular rate, regular rhythm, no murmurs, rubs, or gallops  Pulmonary: no wheeze, no rhonchi, no rales  No chest tenderness  Abdominal: soft, non-tender, nondistended, bowel sounds audible x 4 quadrants  Ave meal completion: %  : Non distended bladder  Continent  Daily BM  Extremities and skin: no edema noted, no rashes  Intact skin  Neurological: alert, cooperative and responsive, Oriented x 2, moving all 4 extremities symmetrically  Ambulatory dysfunction - uses walker without difficulty  Laboratory results / Imaging reviewed: Hard copy/ies in medical chart  Current Medications: All medications reviewed and updated in Nursing Home Chart    Please note:  Voice-recognition software may have been used in the preparation of this document  Occasional wrong word or "sound-alike" substitutions may have occurred due to the inherent limitations of voice recognition software  Interpretation should be guided by context      Joanna Otto  10/24/2022

## 2022-11-17 ENCOUNTER — NURSING HOME VISIT (OUTPATIENT)
Dept: GERIATRICS | Facility: OTHER | Age: 87
End: 2022-11-17

## 2022-11-17 DIAGNOSIS — I10 ESSENTIAL HYPERTENSION: Primary | ICD-10-CM

## 2022-11-17 DIAGNOSIS — F02.80 LATE ONSET ALZHEIMER'S DISEASE WITHOUT BEHAVIORAL DISTURBANCE (HCC): ICD-10-CM

## 2022-11-17 DIAGNOSIS — K59.00 CONSTIPATION, UNSPECIFIED CONSTIPATION TYPE: ICD-10-CM

## 2022-11-17 DIAGNOSIS — G30.1 LATE ONSET ALZHEIMER'S DISEASE WITHOUT BEHAVIORAL DISTURBANCE (HCC): ICD-10-CM

## 2022-11-17 NOTE — PROGRESS NOTES
[address] home notes  LONG TERM CARE       NAME: Sharon Barrios  AGE: 80 y o  SEX: female    DATE OF ENCOUNTER: 2022    Assessment and Plan   Essential hypertension  BP reviewed from facility records, mostly acceptable range with some elevated readings in the SBP 140s due to risk of hypotension will not make any changes  Continue current meds  Continue monitoring BP    Late onset Alzheimer's disease without behavioral disturbance (HCC)  Mostly stable  Continue with current meds  Continue with supportive care  Continue with safety measures  Continue monitoring for worsening symptoms   Requires to be at LTC for care     Constipation  Seems stable   No report of any problems  Continue current meds      Chief Complaint     No new complaints    History of Present Illness     79 yo female seen for monthly visit and med renewal  Patient seen for HTN, dementia and constipation  Reviewed nursing notes since last visit  PMHx     Past Medical History:   Diagnosis Date   • Alzheimer's dementia [facility])    • COVID-19 2020   • Depression    • Ground glass opacity present on imaging of lung 2020   • HLD (hyperlipidemia)    • Hypothyroid    • Low O2 saturation 2020   • Neurocognitive disorder    • T2DM (type 2 diabetes mellitus) ([facility] [address] )      Past Surgical History:   Procedure Laterality Date   •  SECTION     • HYSTERECTOMY       Family History   Problem Relation Age of Onset   • Stomach cancer Mother    • No Known Problems Father      Social History     Socioeconomic History   • Marital status:       Spouse name: Not on file   • Number of children: Not on file   • Years of education: Not on file   • Highest education level: Not on file   Occupational History   • Not on file   Tobacco Use   • Smoking status: Never   • Smokeless tobacco: Never   Vaping Use   • Vaping Use: Never used   Substance and Sexual Activity   • Alcohol use: Not Currently   • Drug use: Not Currently   • Sexual activity: Not Currently   Other Topics Concern   • Not on file   Social History Narrative   • Not on file     Social Determinants of Health     Financial Resource Strain: Not on file   Food Insecurity: Not on file   Transportation Needs: Not on file   Physical Activity: Not on file   Stress: Not on file   Social Connections: Not on file   Intimate Partner Violence: Not on file   Housing Stability: Not on file     Allergies   Allergen Reactions   • Dairy Aid [Tilactase]    • Other      All dairy products        Review of Systems     Denies any pain or shortness  All other review of system negative      Objective   Vital signs reviewed from facility records  PHYSICAL EXAM:  GENERAL: no acute distress  SKIN: warm, dry, no rash, no cyanosis  HEENT: normocephalic, atraumatic, no JVD, no Thyromegaly, no lymphadenopathy  LUNGS: CTA, no wheezing, no rales, expanded equally, no chest tenderness   HEART: normal rhythm, normal rate, no murmur, no gallop  ABDOMEN: soft non tender non distended bs+, no guarding or rebound tenderness  :  no suprapubic tenderness  MUSCULOSKELETAL: strength about 4+/5 all extremities, ROM within normal, no calf tenderness  NEUROLOGY: awake, alert, CN2-12 intact  PSYCH: cooperative, pleasant  Pertinent Laboratory/Diagnostic Studies:  Recent labs and diagnostic tests reviewed in nursing home EMR    Current Medications   Medications reviewed from nursing home EMR          Grayson Jay MD

## 2022-11-17 NOTE — ASSESSMENT & PLAN NOTE
BP reviewed from facility records, mostly acceptable range with some elevated readings in the SBP 140s due to risk of hypotension will not make any changes  Continue current meds  Continue monitoring BP

## 2022-11-17 NOTE — ASSESSMENT & PLAN NOTE
Mostly stable  Continue with current meds  Continue with supportive care  Continue with safety measures  Continue monitoring for worsening symptoms   Requires to be at LTC for care

## 2022-12-14 ENCOUNTER — NURSING HOME VISIT (OUTPATIENT)
Dept: GERIATRICS | Facility: OTHER | Age: 87
End: 2022-12-14

## 2022-12-14 DIAGNOSIS — G30.1 LATE ONSET ALZHEIMER'S DISEASE WITHOUT BEHAVIORAL DISTURBANCE (HCC): Primary | ICD-10-CM

## 2022-12-14 DIAGNOSIS — E11.9 TYPE 2 DIABETES MELLITUS WITHOUT COMPLICATION, WITHOUT LONG-TERM CURRENT USE OF INSULIN (HCC): ICD-10-CM

## 2022-12-14 DIAGNOSIS — F02.80 LATE ONSET ALZHEIMER'S DISEASE WITHOUT BEHAVIORAL DISTURBANCE (HCC): Primary | ICD-10-CM

## 2022-12-14 DIAGNOSIS — E03.8 HYPOTHYROIDISM DUE TO HASHIMOTO'S THYROIDITIS: ICD-10-CM

## 2022-12-14 DIAGNOSIS — I10 ESSENTIAL HYPERTENSION: ICD-10-CM

## 2022-12-14 DIAGNOSIS — E06.3 HYPOTHYROIDISM DUE TO HASHIMOTO'S THYROIDITIS: ICD-10-CM

## 2023-01-03 NOTE — PROGRESS NOTES
[address], [address], [facility], [address] [place] [address]  [phone]    NAME: Anna Davison  AGE: 80 y o  SEX: female    Progress Note    [place]  POS: 32 (LT)    Assessment/Plan:    Late onset Alzheimer's disease without behavioral disturbance (HCC)  Stable  Continue LTCF supportive care  Very good appetite: %  Stable weight     Type 2 diabetes mellitus without complication, without long-term current use of insulin (HCC)  HGBA1C: 8 2 (H: 8/2/2022) <= 9 3 (H: 04/28/2022)  Goal: < 8%  FBG range (12/1-14/2022) = 132 to 219  Continue Lantus 7 units at HS     Essential hypertension   Stable  Goal: < 140-150/90  BP range (12/1-14/2022) = 113/68 to 169/84  **2 episodes of SBP > 150 on this period  Continue Amlodipine to 2 5mg daily/ Lisinopril 2 5mg daily     Hypothyroidism due to Hashimoto's thyroiditis  TSH: 6 96 (H: 10/4/2022) <= 3 18 (WNL: 2/3/2022)  Continue Levothyroxine 112mcg daily (last doese change 9/7/2022)    Chief complaint / Reason for visit: Follow-up visit    History of Present Illness: This is an 77-year-old female patient admitted at [facility] for debility her patient is seen and examined today to follow up acute and chronic medical conditions: Late onset Alzheimer's disease, DM Type 2, HTN and Hypothyroidism      Patient is out of bed for this visit sitting in the common room - alert, cooperative, calm, very pleasant and not in distress  Patient is verbal with clear coherent speech - oriented to name and birthday only  Patient acknowledged feeling well on this visit, " I'm good" - denies any acute medical concerns during ROS assessment including pain  Per nursing, no acute medical concerns for this visit - described as stable and at baseline  Review of Systems:  Per history of present illness, all other systems reviewed and negative      HISTORY:  Medical Hx: Reviewed, unchanged  Family Hx: Reviewed, unchanged  Soc Hx: Reviewed,  unchanged    ALLERGY: Reviewed, unchanged  Allergies   Allergen Reactions   • Dairy Aid [Tilactase]    • Other      All dairy products         PHYSICAL EXAM:  Vital Signs: T98 0F -P73 -R16 BP: 140/85 SpO2: 98% RA  Weight: 154 4 lbs (12/7/2022) <= 151 6 lbs (11/7/2022) <= 154 4 lbs (10/8/2022)    General: NAD  Head: Atraumatic  Normocephalic  Ear: [place]  Eye Exam: anicteric sclera, no discharge, PERRLA, No injection  Oral Exam: moist mucous membranes, no buccaloropharyngeal erythema, palatine tonsils WNL  Neck Exam: no anterior cervical lymphadenopathy noted, neck supple  Cardiovascular: regular rate, regular rhythm, no murmurs, rubs, or gallops  Pulmonary: no wheeze, no rhonchi, no rales  No chest tenderness  Abdominal: soft, non-tender, nondistended, bowel sounds audible x 4 quadrants  Ave meal completion: %  : Non distended bladder  Continent  Daily BM  Extremities and skin: no edema noted, no rashes  Intact skin  Neurological: alert, cooperative and responsive, Oriented x 2, moving all 4 extremities symmetrically  Ambulatory dysfunction - uses walker    Laboratory results / Imaging reviewed: Hard copy/ies in medical chart  Current Medications: All medications reviewed and updated in Nursing Home Chart    Please note: This note was completed in part utilizing a voice-recognition software may have been used in the preparation of this document  Grammatical errors, random word insertion, spelling mistakes, and incomplete sentences may be an occasional consequence of the system secondary to software limitations, ambient noise and hardware issues  Occasional wrong word or "sound-alike" substitutions may have occurred due to the inherent limitations of voice recognition software  At the time of dictation, efforts were made to edit, clarify and/or correct errors  Interpretation should be guided by context  Please read the chart carefully and recognize, using context, where substitutions have occurred   If you have any questions or concerns about the context, text or information contained within the body of this dictation, please contact myself, the provider, for further clarification        AWA Benson  1/3/2023

## 2023-01-17 ENCOUNTER — NURSING HOME VISIT (OUTPATIENT)
Dept: GERIATRICS | Facility: OTHER | Age: 88
End: 2023-01-17

## 2023-01-17 DIAGNOSIS — F02.80 LATE ONSET ALZHEIMER'S DISEASE WITHOUT BEHAVIORAL DISTURBANCE (HCC): Primary | ICD-10-CM

## 2023-01-17 DIAGNOSIS — R26.2 AMBULATORY DYSFUNCTION: ICD-10-CM

## 2023-01-17 DIAGNOSIS — K59.00 CONSTIPATION, UNSPECIFIED CONSTIPATION TYPE: ICD-10-CM

## 2023-01-17 DIAGNOSIS — G30.1 LATE ONSET ALZHEIMER'S DISEASE WITHOUT BEHAVIORAL DISTURBANCE (HCC): Primary | ICD-10-CM

## 2023-01-17 NOTE — ASSESSMENT & PLAN NOTE
Ambulating at times without assistance  Continue with safety measures  Continue with fall precautions

## 2023-01-17 NOTE — PROGRESS NOTES
[address] home notes  LONG TERM CARE       NAME: Kris Giron  AGE: 80 y o  SEX: female    DATE OF ENCOUNTER: 2023    Assessment and Plan   Late onset Alzheimer's disease without behavioral disturbance ([facility] )  Mostly stable  Continue with supportive care  Continue with safety measures  Continues to require help with ADLs    Constipation  Seems stable  Continue current meds  Continue monitoring symptoms      Ambulatory dysfunction  Ambulating at times without assistance  Continue with safety measures  Continue with fall precautions       Chief Complaint     No new complaints    History of Present Illness     81 yo female seen for monthly visit and med renewal  Patient seen for dementia, constipation and ambulatory dysfunction  Reviewed nursing notes since last visit  PMHx     Past Medical History:   Diagnosis Date   • Alzheimer's dementia [facility])    • COVID-19 2020   • Depression    • Ground glass opacity present on imaging of lung 2020   • HLD (hyperlipidemia)    • Hypothyroid    • Low O2 saturation 2020   • Neurocognitive disorder    • T2DM (type 2 diabetes mellitus) ([facility] )      Past Surgical History:   Procedure Laterality Date   •  SECTION     • HYSTERECTOMY       Family History   Problem Relation Age of Onset   • Stomach cancer Mother    • No Known Problems Father      Social History     Socioeconomic History   • Marital status:       Spouse name: Not on file   • Number of children: Not on file   • Years of education: Not on file   • Highest education level: Not on file   Occupational History   • Not on file   Tobacco Use   • Smoking status: Never   • Smokeless tobacco: Never   Vaping Use   • Vaping Use: Never used   Substance and Sexual Activity   • Alcohol use: Not Currently   • Drug use: Not Currently   • Sexual activity: Not Currently   Other Topics Concern   • Not on file   Social History Narrative   • Not on file     Social Determinants of Health Financial Resource Strain: Not on file   Food Insecurity: Not on file   Transportation Needs: Not on file   Physical Activity: Not on file   Stress: Not on file   Social Connections: Not on file   Intimate Partner Violence: Not on file   Housing Stability: Not on file     Allergies   Allergen Reactions   • Dairy Aid [Tilactase]    • Other      All dairy products        Review of Systems     Denies any pain or shortness of breath  All other review of system negative      Objective   Vital signs reviewed from facility records  PHYSICAL EXAM:  GENERAL: no acute distress  SKIN: warm, dry, no rash, no cyanosis  HEENT: normocephalic, atraumatic, no JVD, no Thyromegaly, no lymphadenopathy  LUNGS: CTA, no wheezing, no rales, expanded equally, no chest tenderness   HEART: normal rhythm, normal rate, no murmur, no gallop  ABDOMEN: soft non tender non distended bs+, no guarding or rebound tenderness  :  no suprapubic tenderness  MUSCULOSKELETAL: strength about 4+/5 all extremities, no calf tenderness  NEUROLOGY: awake, alert, CN2-12 intact  PSYCH: cooperative, pleasant  Pertinent Laboratory/Diagnostic Studies:  Recent labs and diagnostic tests reviewed in nursing home EMR    Current Medications   Medications reviewed from nursing home EMR          Lissa Nails MD

## 2023-01-17 NOTE — ASSESSMENT & PLAN NOTE
Mostly stable  Continue with supportive care  Continue with safety measures  Continues to require help with ADLs

## 2023-02-15 ENCOUNTER — NURSING HOME VISIT (OUTPATIENT)
Dept: GERIATRICS | Facility: OTHER | Age: 88
End: 2023-02-15

## 2023-02-15 DIAGNOSIS — E11.9 TYPE 2 DIABETES MELLITUS WITHOUT COMPLICATION, WITHOUT LONG-TERM CURRENT USE OF INSULIN (HCC): ICD-10-CM

## 2023-02-15 DIAGNOSIS — I10 ESSENTIAL HYPERTENSION: ICD-10-CM

## 2023-02-15 DIAGNOSIS — G30.1 LATE ONSET ALZHEIMER'S DISEASE WITHOUT BEHAVIORAL DISTURBANCE (HCC): Primary | ICD-10-CM

## 2023-02-15 DIAGNOSIS — E03.8 HYPOTHYROIDISM DUE TO HASHIMOTO'S THYROIDITIS: ICD-10-CM

## 2023-02-15 DIAGNOSIS — E06.3 HYPOTHYROIDISM DUE TO HASHIMOTO'S THYROIDITIS: ICD-10-CM

## 2023-02-15 DIAGNOSIS — F02.80 LATE ONSET ALZHEIMER'S DISEASE WITHOUT BEHAVIORAL DISTURBANCE (HCC): Primary | ICD-10-CM

## 2023-02-21 NOTE — PROGRESS NOTES
[address], [address], [facility], [address] [place] [address]  [phone]    NAME: Mahin Shipman  AGE: 80 y o  SEX: female    Progress Note    [place]  POS: 32 (LT)    Assessment/Plan:    Late onset Alzheimer's disease without behavioral disturbance (HCC)  Stable  Continue LTCF supportive care  Very good appetite: 50-75% on the last 30 days  Stable weight  Patient actively ambulatory with walker - steady gait     Type 2 diabetes mellitus without complication, without long-term current use of insulin (HCC)  HGBA1C: 8 2 (H: 8/2/2022) <= 9 3 (H: 04/28/2022)  Goal: < 8%  FBG range (2/1-15/2023) = 129 to 200  Continue Lantus 7 units at HS  Next HbA1C: 2/20/2023     Essential hypertension   Stable  Goal: < 140-150/90  BP range (2/1-15/2023) = 105/55 to 161/59  ** 2 episodes of SBP > 150 on this period  Continue Amlodipine to 2 5mg daily/ Lisinopril 2 5mg daily     Hypothyroidism due to Hashimoto's thyroiditis  TSH: 6 96 (H: 10/4/2022) <= 3 18 (WNL: 2/3/2022)  Continue Levothyroxine 112mcg daily  Next scheduled TSH on 2/20/2023    Chief complaint / Reason for visit: Follow- visit    History of Present Illness: This is an 78-year-old female patient admitted at [facility] for debility her patient is seen and examined today to follow up acute and chronic medical conditions: Late onset Alzheimer's disease, DM Type 2, HTN and Hypothyroidism      Patient is out of bed for this visit sitting in the common room - alert, cooperative, calm, very pleasant and not in distress  Patient is verbal with clear coherent speech - oriented to name and birthday only  Patient acknowledged feeling well on this visit, " I'm good" - denies any acute medical concerns during ROS assessment including pain  Per nursing, no acute medical concerns for this visit - described as stable and at baseline  Nursing and prior provider notes reviewed on this visit  Discussed visit with PCP and nursing staff/ supervisor      Review of Systems:  Per history of present illness, all other systems reviewed and negative  HISTORY:  Medical Hx: Reviewed, unchanged  Family Hx: Reviewed, unchanged  Soc Hx: Reviewed,  unchanged    ALLERGY: Reviewed, unchanged  Allergies   Allergen Reactions   • Dairy Aid [Tilactase]    • Other      All dairy products         PHYSICAL EXAM:  Vital Signs: T97 4F -P78 -R16 BP: 128/67 SpO2: 100% RA  Weight: 156 6 lbs (2/1/2023) <= 156 0 lbs (1/1/2023) <= 154 4 lbs (12/7/2022)    General: NAD  Well appearing  No acute distress  Head: Atraumatic  Normocephalic  Ear: [place] - no hearing aids  Eye Exam: anicteric sclera, no discharge, PERRLA, No injection  Oral Exam: moist mucous membranes, no buccaloropharyngeal erythema, palatine tonsils WNL  Neck Exam: no anterior cervical lymphadenopathy noted, neck supple  Trachea midline, no carotid bruit, no masses  Cardiovascular: regular rate, regular rhythm, no murmurs, rubs, or gallops  S1 and S2  Pulmonary: no wheeze, no rhonchi, no rales  No chest tenderness  Normal chest wall expansion  Abdominal: soft, non-tender, nondistended, bowel sounds audible x 4 quadrants  No palpable hepatosplenomegaly, no tympany  Ave meal completion: 50-75% on the last 30 days  : Non distended bladder  Continent  Daily BM  Extremities and skin: no edema noted, no rashes  Intact skin  Neurological: alert, cooperative and responsive, Oriented x 2  Cranial Nerves II-XII grossly intact, no tics, normal sensation to pressure and light touch   moving all 4 extremities symmetrically  Ambulatory dysfunction - uses walker  Laboratory results / Imaging reviewed: Hard copy/ies in medical chart  Last labs done Oct, 2022    Current Medications: All medications reviewed and updated in Nursing Home Chart    Please note: This note was completed in part utilizing a voice-recognition software may have been used in the preparation of this document   Grammatical errors, random word insertion, spelling mistakes, and incomplete sentences may be an occasional consequence of the system secondary to software limitations, ambient noise and hardware issues  Occasional wrong word or "sound-alike" substitutions may have occurred due to the inherent limitations of voice recognition software  At the time of dictation, efforts were made to edit, clarify and/or correct errors  Interpretation should be guided by context  Please read the chart carefully and recognize, using context, where substitutions have occurred  If you have any questions or concerns about the context, text or information contained within the body of this dictation, please contact myself, the provider, for further clarification        AWA Paz  2/21/2023

## 2023-03-16 ENCOUNTER — NURSING HOME VISIT (OUTPATIENT)
Dept: GERIATRICS | Facility: OTHER | Age: 88
End: 2023-03-16

## 2023-03-16 DIAGNOSIS — G30.1 LATE ONSET ALZHEIMER'S DISEASE WITHOUT BEHAVIORAL DISTURBANCE (HCC): Primary | ICD-10-CM

## 2023-03-16 DIAGNOSIS — E78.2 MIXED HYPERLIPIDEMIA: ICD-10-CM

## 2023-03-16 DIAGNOSIS — K59.00 CONSTIPATION, UNSPECIFIED CONSTIPATION TYPE: ICD-10-CM

## 2023-03-16 DIAGNOSIS — F02.80 LATE ONSET ALZHEIMER'S DISEASE WITHOUT BEHAVIORAL DISTURBANCE (HCC): Primary | ICD-10-CM

## 2023-03-16 NOTE — PROGRESS NOTES
[address] home notes  LONG TERM CARE       NAME: Rachell Kaminski  AGE: 80 y o  SEX: female    DATE OF ENCOUNTER: 3/16/2023    Assessment and Plan   Late onset Alzheimer's disease without behavioral disturbance ([facility] )  Stable  Continue current meds  Continue with safety measures  Continue with fall precautions     Constipation  Seems stable  No reports of issues  Continue current meds  Continue monitoring symptoms     Mixed hyperlipidemia  Lipid panel acceptable range for patient  No benefit in starting meds at her age and condition      Chief Complaint     No new complaints    History of Present Illness     81 yo female seen for monthly visit and med renewal  Patient seen for dementia, constipation and HLD  Reviewed nursing notes since last visit  PMHx     Past Medical History:   Diagnosis Date   • Alzheimer's dementia [facility])    • COVID-19 2020   • Depression    • Ground glass opacity present on imaging of lung 2020   • HLD (hyperlipidemia)    • Hypothyroid    • Low O2 saturation 2020   • Neurocognitive disorder    • T2DM (type 2 diabetes mellitus) ([facility] )      Past Surgical History:   Procedure Laterality Date   •  SECTION     • HYSTERECTOMY       Family History   Problem Relation Age of Onset   • Stomach cancer Mother    • No Known Problems Father      Social History     Socioeconomic History   • Marital status:       Spouse name: Not on file   • Number of children: Not on file   • Years of education: Not on file   • Highest education level: Not on file   Occupational History   • Not on file   Tobacco Use   • Smoking status: Never   • Smokeless tobacco: Never   Vaping Use   • Vaping Use: Never used   Substance and Sexual Activity   • Alcohol use: Not Currently   • Drug use: Not Currently   • Sexual activity: Not Currently   Other Topics Concern   • Not on file   Social History Narrative   • Not on file     Social Determinants of Health     Financial Resource Strain: Not on file   Food Insecurity: Not on file   Transportation Needs: Not on file   Physical Activity: Not on file   Stress: Not on file   Social Connections: Not on file   Intimate Partner Violence: Not on file   Housing Stability: Not on file     Allergies   Allergen Reactions   • Dairy Aid [Tilactase]    • Other      All dairy products        Review of Systems     Denies any pain or shortness of breath   All other review of system negative      Objective   Vital signs reviewed from facility     PHYSICAL EXAM:  GENERAL: no acute distress  SKIN: warm, dry, no rash, no cyanosis  HEENT: normocephalic, atraumatic, no JVD, no Thyromegaly, no lymphadenopathy  LUNGS: CTA, no wheezing, no rales, expanded equally, no chest tenderness   HEART: normal rhythm, normal rate, no murmur, no gallop  ABDOMEN: soft non tender non distended bs+, no guarding or rebound tenderness  :  no suprapubic tenderness  MUSCULOSKELETAL: strength about 4+/5 all extremities, no calf tenderness  NEUROLOGY: awake, alert,  CN2-12 intact  PSYCH: cooperative, pleasant  Pertinent Laboratory/Diagnostic Studies:  Recent labs and diagnostic tests reviewed in nursing home EMR    Current Medications   Medications reviewed from nursing home EMR          Mendel Ritter MD

## 2023-03-27 ENCOUNTER — NURSING HOME VISIT (OUTPATIENT)
Dept: GERIATRICS | Facility: OTHER | Age: 88
End: 2023-03-27

## 2023-03-27 DIAGNOSIS — J06.9 URI, ACUTE: Primary | ICD-10-CM

## 2023-03-29 PROBLEM — J06.9 URI, ACUTE: Status: ACTIVE | Noted: 2023-03-29

## 2023-03-29 NOTE — ASSESSMENT & PLAN NOTE
(+) coughing and nasal observed this morning  LCTA  No hypoxia   NO febrile episode  Rapid COVID-19 test (3/27/2023): Negative  Start Guaifenesin 400mg TID x 5 days  Encourage oral fluids as tolerated  Continue to monitor for new or worsening symptoms

## 2023-03-29 NOTE — PROGRESS NOTES
[address], [address], [facility], [address] [place] [address]  [phone]    NAME: Alhaji Hopson  AGE: 80 y o  SEX: female    Progress Note    Location[place]  POS: 32 ([facility])    Assessment/Plan:    URI, acute  (+) coughing and nasal observed this morning  LCTA  No hypoxia  NO febrile episode  Rapid COVID-19 test (3/27/2023): Negative  Start Guaifenesin 400mg TID x 5 days  Encourage oral fluids as tolerated  Continue to monitor for new or worsening symptoms      Chief complaint / Reason for visit: Acute Visit    History of Present Illness: This is an 80-year-old female patient admitted at [facility]  Patient is seen and examined today per nursing request for active coughing and congestion observed this morning  Patient OOB for this visit -alert, cooperative, calm, pleasant and not in distress  Patient at baseline mentation and orientation x 2  Patient reported having coughing and stuffy nose - unable to recall onset  Patient denies sore throat, dysphagia/odynophagia, headache, fever/chills or myalgia  Rapid COVID-19 test (3/27/2023): Negative  Will manage symptomatically for now  Nursing to continue to monitor symptoms  Nursing and prior provider notes reviewed on this visit  Discussed visit with PCP and nursing staff/ supervisor  Review of Systems:  Per history of present illness, all other systems reviewed and negative  HISTORY:  Medical Hx: Reviewed, unchanged  Family Hx: Reviewed, unchanged  Soc Hx: Reviewed,  unchanged    ALLERGY: Reviewed, unchanged  Allergies   Allergen Reactions   • Dairy Aid [Tilactase]    • Other      All dairy products         PHYSICAL EXAM:  Vital Signs: T97 0F -P75 -R16 BP: 111/60 SpO2: 96% RA  Weight: 155 6F (3/2/2023)    General: NAD  Well appearing  No acute distress  Head: Atraumatic  Normocephalic    Eye Exam: anicteric sclera, no discharge, PERRLA, No injection  Oral Exam: moist mucous membranes, no buccaloropharyngeal erythema, palatine tonsils "WNL   Neck Exam: no anterior cervical lymphadenopathy noted, neck supple  Trachea midline, no carotid bruit, no masses  Cardiovascular: regular rate, regular rhythm, no murmurs, rubs, or gallops  S1 and S2  Pulmonary: no wheeze, no rhonchi, no rales  No chest tenderness  Normal chest wall expansion  Abdominal: soft, non-tender, nondistended, bowel sounds audible x 4 quadrants  No palpable hepatosplenomegaly, no tympany  : Non distended bladder  Continent  Extremities and skin: no edema noted, no rashes  Intact skin  Neurological: alert, cooperative and responsive, Oriented x 2  Cranial Nerves II-XII grossly intact, no tics, normal sensation to pressure and light touch   moving all 4 extremities symmetrically  Laboratory results / Imaging reviewed: Hard copy/ies in medical chart  Current Medications: All medications reviewed and updated in Nursing Home Chart    Please note: This note was completed in part utilizing a voice-recognition software may have been used in the preparation of this document  Grammatical errors, random word insertion, spelling mistakes, and incomplete sentences may be an occasional consequence of the system secondary to software limitations, ambient noise and hardware issues  Occasional wrong word or \"sound-alike\" substitutions may have occurred due to the inherent limitations of voice recognition software  At the time of dictation, efforts were made to edit, clarify and/or correct errors  Interpretation should be guided by context  Please read the chart carefully and recognize, using context, where substitutions have occurred  If you have any questions or concerns about the context, text or information contained within the body of this dictation, please contact myself, the provider, for further clarification        AWA Lopez  3/29/2023  "

## 2023-04-14 ENCOUNTER — NURSING HOME VISIT (OUTPATIENT)
Dept: GERIATRICS | Facility: OTHER | Age: 88
End: 2023-04-14

## 2023-04-14 DIAGNOSIS — E11.9 TYPE 2 DIABETES MELLITUS WITHOUT COMPLICATION, WITHOUT LONG-TERM CURRENT USE OF INSULIN (HCC): ICD-10-CM

## 2023-04-14 DIAGNOSIS — F02.80 LATE ONSET ALZHEIMER'S DISEASE WITHOUT BEHAVIORAL DISTURBANCE (HCC): Primary | ICD-10-CM

## 2023-04-14 DIAGNOSIS — E06.3 HYPOTHYROIDISM DUE TO HASHIMOTO'S THYROIDITIS: ICD-10-CM

## 2023-04-14 DIAGNOSIS — I10 ESSENTIAL HYPERTENSION: ICD-10-CM

## 2023-04-14 DIAGNOSIS — G30.1 LATE ONSET ALZHEIMER'S DISEASE WITHOUT BEHAVIORAL DISTURBANCE (HCC): Primary | ICD-10-CM

## 2023-04-14 DIAGNOSIS — E03.8 HYPOTHYROIDISM DUE TO HASHIMOTO'S THYROIDITIS: ICD-10-CM

## 2023-05-01 NOTE — PROGRESS NOTES
"[address], [address], [facility], [address] [place] [address]  [phone]    NAME: Collins Cordova  AGE: 80 y o  SEX: female    Progress Note    [place]  POS: 32 (LT)    Assessment/Plan:    Late onset Alzheimer's disease without behavioral disturbance (HCC)  Stable  No known behavioral disturbance  Continue LTCF supportive care  Very good appetite: % on the last 30 days  Small increment of weight gain (April, 2023)  No Anemia (2/20/2023)  Renal and hepatic functions WNL (2/20/2023)     Type 2 diabetes mellitus without complication, without long-term current use of insulin ([facility])  HGBA1C: 8 5 (H: 2/20/2023) <= 8 2 (H: 8/2/2022) <= 9 3 (H: 04/28/2022)  Goal: < 8%  FBG range (4/1-14/2023) = 81 to 186  Continue Lantus 11 units at HS (dose increased on 3/16/2023)  Next HbA1C: 8/21/2023     Essential hypertension   Goal: < 140-150/90  BP range (4/1-14/2023) = 110/81 to 142/86  Continue Amlodipine to 2 5mg daily/ Lisinopril 2 5mg daily     Hypothyroidism due to Hashimoto's thyroiditis  TSH: 2 50 (WNL: 2/20/2023) <= 6 96 (H: 10/4/2022)  Continue Levothyroxine 112mcg daily  Next scheduled TSH on 8/21/2023    Chief complaint / Reason for visit: Follow- visit    History of Present Illness: This is an 77-year-old female patient admitted at [facility] for debility her patient is seen and examined today to follow up acute and chronic medical conditions: Late onset Alzheimer's disease, DM Type 2, HTN and Hypothyroidism      Patient is out of bed for this visit sitting in the common room - alert, cooperative, calm, very pleasant and not in distress  Patient is verbal with clear coherent speech - oriented to name and birthday only  Patient acknowledged feeling well on this visit, \" I'm good\" - denies any acute medical concerns during ROS assessment including pain  Per nursing, no acute medical concerns for this visit - described as stable and at baseline      Nursing and prior provider notes reviewed on " this visit  Discussed visit with PCP and nursing staff/ supervisor  Review of Systems:  Per history of present illness, all other systems reviewed and negative  HISTORY:  Medical Hx: Reviewed, unchanged  Family Hx: Reviewed, unchanged  Soc Hx: Reviewed,  unchanged    ALLERGY: Reviewed, unchanged  Allergies   Allergen Reactions    Other      All dairy products     Tilactase         PHYSICAL EXAM:  Vital Signs: T97 0F -P76 -R16 BP: 122/67 SpO2: 98% RA  Weight: 157 0 lbs (4/5/2023) <= 155 6 lbs (3/2/2023) <= 156 6 bs (2/1/2023)    General: NAD  Well appearing  No acute distress  Head: Atraumatic  Normocephalic  Ear: [place] - no hearing aids  Eye Exam: anicteric sclera, no discharge, PERRLA, No injection  Oral Exam: moist mucous membranes, no buccaloropharyngeal erythema, palatine tonsils WNL  Neck Exam: no anterior cervical lymphadenopathy noted, neck supple  Trachea midline, no carotid bruit, no masses  Cardiovascular: regular rate, regular rhythm, no murmurs, rubs, or gallops  S1 and S2  Pulmonary: no wheeze, no rhonchi, no rales  No chest tenderness  Normal chest wall expansion  Abdominal: soft, non-tender, nondistended, bowel sounds audible x 4 quadrants  No palpable hepatosplenomegaly, no tympany  Ave meal completion: % on the last 30 days  : Non distended bladder  Continent  Daily BM  Extremities and skin: no edema noted, no rashes  Intact skin  Neurological: alert, cooperative and responsive, Oriented x 2  no tics, normal sensation to pressure and light touch   moving all 4 extremities symmetrically  Ambulatory dysfunction - uses walker      Laboratory results / Imaging reviewed: Hard copy/ies in medical chart:    * CBC wo diff (2/20/2023):   HEMOGLOBIN 12 4 g/dL 11 5-14 5  Final         HEMATOCRIT 36 3 % 35 0-43 0  Final         WBC 5 3 thou/cmm 4 0-10 0  Final         RBC 4 05 mill/cmm 3 70-4 70  Final         PLATELET COUNT 957 thou/cmm 140-350  Final         MPV 9 5 fL 7 5-11 3  Final  MCV 90 fL   Final         MCH 30 5 pg 26 0-34 0  Final         MCHC 34 1 g/dL 32 0-37 0  Final         RDW 14 4 % 12 0-16 0  Final       * CMP (2/20/2023):   GLUCOSE 174 mg/dL 65-99 H Final         BUN 11 mg/dL 7-25  Final         CREATININE 0 53 mg/dL 0 40-1 10  Final         SODIUM 137 mmol/L 135-145  Final         POTASSIUM 4 2 mmol/L 3 5-5 2  Final         CHLORIDE 105 mmol/L 100-109  Final         CARBON DIOXIDE 26 mmol/L 23-31  Final         CALCIUM 8 4 mg/dL 8 5-10 1 L Final         ALKALINE PHOSPHATASE 96 U/L   Final         ALBUMIN 2 9 g/dL 3 5-4 8 L Final         BILIRUBIN,TOTAL 0 4 mg/dL 0 2-1 0  Final   Use of this assay is not recommended for patients undergoing treatment   with eltrombopag due to the potential for falsely elevated results       PROTEIN, TOTAL 5 7 g/dL 6 3-8 3 L Final         AST 15 U/L <41  Final         ALT 28 U/L <56  Final         ANION GAP 6  3-11  Final         eGFRcr 89  >59  Final         eGFRcr COMMENT (Note)    Final       * Lipid Panel (2/20/2023):   CHOLESTEROL 207 mg/dL <200 H Final         TRIGLYCERIDE 142 mg/dL <150  Final         CHOL, HDL, DIRECT 51 mg/dL >40  Final         CHOL, NON- mg/dL <160  Final   Note: For NCEP interpretive guidelines please refer to the Laboratory   Handbook       CHOL, LDL, CALC 128 mg/dL <130  Final   LDL Cholesterol was calculated using the Friedewald equation  Direct   measurement of LDL is not indicated for this patient based on [facility]'s   analytical algorithm for measurement of LDL Cholesterol       CHOL/HDL RATIO 4 06    Final       * TSH (2/20/2023) = 2 50 (WNL)    * HbA1C (2/20/2023) = 8 5 (H)      Current Medications: All medications reviewed and updated in Nursing Home Chart    Please note: This note was completed in part utilizing a voice-recognition software may have been used in the preparation of this document   Grammatical errors, random word insertion, spelling mistakes, and incomplete sentences may be an "occasional consequence of the system secondary to software limitations, ambient noise and hardware issues  Occasional wrong word or \"sound-alike\" substitutions may have occurred due to the inherent limitations of voice recognition software  At the time of dictation, efforts were made to edit, clarify and/or correct errors  Interpretation should be guided by context  Please read the chart carefully and recognize, using context, where substitutions have occurred  If you have any questions or concerns about the context, text or information contained within the body of this dictation, please contact myself, the provider, for further clarification        AWA Esparza  5/1/2023  "

## 2023-05-16 ENCOUNTER — NURSING HOME VISIT (OUTPATIENT)
Dept: GERIATRICS | Facility: OTHER | Age: 88
End: 2023-05-16

## 2023-05-16 DIAGNOSIS — K59.00 CONSTIPATION, UNSPECIFIED CONSTIPATION TYPE: ICD-10-CM

## 2023-05-16 DIAGNOSIS — G30.1 LATE ONSET ALZHEIMER'S DISEASE WITHOUT BEHAVIORAL DISTURBANCE (HCC): ICD-10-CM

## 2023-05-16 DIAGNOSIS — F02.80 LATE ONSET ALZHEIMER'S DISEASE WITHOUT BEHAVIORAL DISTURBANCE (HCC): ICD-10-CM

## 2023-05-16 DIAGNOSIS — I10 ESSENTIAL HYPERTENSION: Primary | ICD-10-CM

## 2023-05-16 NOTE — ASSESSMENT & PLAN NOTE
Seems stable  Continue current meds  Continue monitoring symptoms   Continue with safety measures and fall precautions

## 2023-05-16 NOTE — PROGRESS NOTES
[address] home notes  LONG TERM CARE       NAME: Omar Morel  AGE: 80 y o  SEX: female    DATE OF ENCOUNTER: 2023    Assessment and Plan   Essential hypertension  BP reviewed from facility records, mostly acceptable range  Continue current meds  Continue monitoring BP    Late onset Alzheimer's disease without behavioral disturbance (HCC)  Seems stable  Continue current meds  Continue monitoring symptoms   Continue with safety measures and fall precautions     Constipation  Seems stable   No reports of issues  Continue current meds  Continue monitoring symptoms      Chief Complaint     No new complaints     History of Present Illness     81 yo female seen for monthly visit and med renewal  Patient seen for dementia, constipation and HTN  Reviewed nursing notes since last visit  PMHx     Past Medical History:   Diagnosis Date   • Alzheimer's dementia [facility])    • COVID-19 2020   • Depression    • Ground glass opacity present on imaging of lung 2020   • HLD (hyperlipidemia)    • Hypothyroid    • Low O2 saturation 2020   • Neurocognitive disorder    • T2DM (type 2 diabetes mellitus) ([facility] [address] )      Past Surgical History:   Procedure Laterality Date   •  SECTION     • HYSTERECTOMY       Family History   Problem Relation Age of Onset   • Stomach cancer Mother    • No Known Problems Father      Social History     Socioeconomic History   • Marital status:       Spouse name: Not on file   • Number of children: Not on file   • Years of education: Not on file   • Highest education level: Not on file   Occupational History   • Not on file   Tobacco Use   • Smoking status: Never   • Smokeless tobacco: Never   Vaping Use   • Vaping Use: Never used   Substance and Sexual Activity   • Alcohol use: Not Currently   • Drug use: Not Currently   • Sexual activity: Not Currently   Other Topics Concern   • Not on file   Social History Narrative   • Not on file     Social Determinants of Health     Financial Resource Strain: Not on file   Food Insecurity: Not on file   Transportation Needs: Not on file   Physical Activity: Not on file   Stress: Not on file   Social Connections: Not on file   Intimate Partner Violence: Not on file   Housing Stability: Not on file     Allergies   Allergen Reactions   • Other      All dairy products    • Tilactase        Review of Systems     Denies any pain or shortness of breath    All other review of system negative      Objective   Vital signs reviewed from facility records  PHYSICAL EXAM:  GENERAL: no acute distress  SKIN: warm, dry, no rash, no cyanosis  HEENT: normocephalic, atraumatic, no JVD, no Thyromegaly, no lymphadenopathy  LUNGS: CTA, no wheezing, no rales, expanded equally, no chest tenderness   HEART: normal rhythm, normal rate, no murmur, no gallop  ABDOMEN: soft non tender non distended bs+, no guarding or rebound tenderness  :  no suprapubic tenderness  MUSCULOSKELETAL: strength about 4+/5 all extremities, no calf tenderness  NEUROLOGY: awake, alert, CN2-12 intact  PSYCH: cooperative, pleasant  Pertinent Laboratory/Diagnostic Studies:  Recent labs and diagnostic tests reviewed in nursing home EMR    Current Medications   Medications reviewed from nursing home EMR          Bridgette Neves MD

## 2023-05-16 NOTE — ASSESSMENT & PLAN NOTE
BP reviewed from facility records, mostly acceptable range  Continue current meds  Continue monitoring BP

## 2023-05-28 PROBLEM — J06.9 URI, ACUTE: Status: RESOLVED | Noted: 2023-03-29 | Resolved: 2023-05-28

## 2023-06-06 ENCOUNTER — NURSING HOME VISIT (OUTPATIENT)
Dept: GERIATRICS | Facility: OTHER | Age: 88
End: 2023-06-06
Payer: COMMERCIAL

## 2023-06-06 DIAGNOSIS — E11.9 TYPE 2 DIABETES MELLITUS WITHOUT COMPLICATION, WITHOUT LONG-TERM CURRENT USE OF INSULIN (HCC): ICD-10-CM

## 2023-06-06 DIAGNOSIS — I10 ESSENTIAL HYPERTENSION: ICD-10-CM

## 2023-06-06 DIAGNOSIS — E06.3 HYPOTHYROIDISM DUE TO HASHIMOTO'S THYROIDITIS: Primary | ICD-10-CM

## 2023-06-06 DIAGNOSIS — E03.8 HYPOTHYROIDISM DUE TO HASHIMOTO'S THYROIDITIS: Primary | ICD-10-CM

## 2023-06-06 PROCEDURE — 99309 SBSQ NF CARE MODERATE MDM 30: CPT | Performed by: INTERNAL MEDICINE

## 2023-06-06 NOTE — ASSESSMENT & PLAN NOTE
Lab Results   Component Value Date    HGBA1C 8 5 (H) 02/20/2023     Blood sugars reviewed from facility records, slightly on the higher side, for now continue current meds  Continue monitoring trend of blood sugars and if continues be high frequently will adjust insulin  Continue current diet

## 2023-06-06 NOTE — PROGRESS NOTES
[address] home notes  LONG TERM CARE       NAME: Ivan Vargas  AGE: 80 y o  SEX: female    DATE OF ENCOUNTER: 2023    Assessment and Plan   Hypothyroidism due to Hashimoto's thyroiditis  Continue synthroid   Last TSH normal in February     Essential hypertension  BP reviewed from facility records, acceptable range  Continue current meds  Continue monitoring BP    Type 2 diabetes mellitus without complication, without long-term current use of insulin (HCC)    Lab Results   Component Value Date    HGBA1C 8 5 (H) 2023     Blood sugars reviewed from facility records, slightly on the higher side, for now continue current meds  Continue monitoring trend of blood sugars and if continues be high frequently will adjust insulin  Continue current diet       Chief Complaint     No new complaints    History of Present Illness     81 yo female seen for monthly visit and med renewal  Patient seen for DMII, HTN and hypothyroidism  Reviewed nursing notes since last visit  PMHx     Past Medical History:   Diagnosis Date   • Alzheimer's dementia [facility])    • COVID-19 2020   • Depression    • Ground glass opacity present on imaging of lung 2020   • HLD (hyperlipidemia)    • Hypothyroid    • Low O2 saturation 2020   • Neurocognitive disorder    • T2DM (type 2 diabetes mellitus) ([facility] [address] )      Past Surgical History:   Procedure Laterality Date   •  SECTION     • HYSTERECTOMY       Family History   Problem Relation Age of Onset   • Stomach cancer Mother    • No Known Problems Father      Social History     Socioeconomic History   • Marital status:       Spouse name: Not on file   • Number of children: Not on file   • Years of education: Not on file   • Highest education level: Not on file   Occupational History   • Not on file   Tobacco Use   • Smoking status: Never   • Smokeless tobacco: Never   Vaping Use   • Vaping Use: Never used   Substance and Sexual Activity   • Alcohol use: Not Currently   • Drug use: Not Currently   • Sexual activity: Not Currently   Other Topics Concern   • Not on file   Social History Narrative   • Not on file     Social Determinants of Health     Financial Resource Strain: Not on file   Food Insecurity: Not on file   Transportation Needs: Not on file   Physical Activity: Not on file   Stress: Not on file   Social Connections: Not on file   Intimate Partner Violence: Not on file   Housing Stability: Not on file     Allergies   Allergen Reactions   • Other      All dairy products    • Tilactase        Review of Systems     Denies any pain or shortness of breath  All other review of system negative      Objective   Vital signs reviewed from facility records  PHYSICAL EXAM:  GENERAL: no acute distress  SKIN: warm, dry, no rash, no cyanosis  HEENT: normocephalic, atraumatic, no JVD, no Thyromegaly, no lymphadenopathy  LUNGS: CTA, no wheezing, no rales, expanded equally, no chest tenderness   HEART: normal rhythm, normal rate, no murmur, no gallop  ABDOMEN: soft non tender non distended bs+, no guarding or rebound tenderness  :  no suprapubic tenderness  MUSCULOSKELETAL: strength about 4+/5 all extremities, no calf tenderness  NEUROLOGY: awake, alert, CN2-12 intact  PSYCH: cooperative, pleasant  Pertinent Laboratory/Diagnostic Studies:  Recent labs and diagnostic tests reviewed in nursing home EMR    Current Medications   Medications reviewed from nursing home EMR          Cassia Campbell MD

## 2023-07-17 ENCOUNTER — NURSING HOME VISIT (OUTPATIENT)
Dept: GERIATRICS | Facility: OTHER | Age: 88
End: 2023-07-17
Payer: COMMERCIAL

## 2023-07-17 DIAGNOSIS — E06.3 HYPOTHYROIDISM DUE TO HASHIMOTO'S THYROIDITIS: ICD-10-CM

## 2023-07-17 DIAGNOSIS — F02.80 LATE ONSET ALZHEIMER'S DISEASE WITHOUT BEHAVIORAL DISTURBANCE (HCC): Primary | ICD-10-CM

## 2023-07-17 DIAGNOSIS — G30.1 LATE ONSET ALZHEIMER'S DISEASE WITHOUT BEHAVIORAL DISTURBANCE (HCC): Primary | ICD-10-CM

## 2023-07-17 DIAGNOSIS — E03.8 HYPOTHYROIDISM DUE TO HASHIMOTO'S THYROIDITIS: ICD-10-CM

## 2023-07-17 DIAGNOSIS — I10 ESSENTIAL HYPERTENSION: ICD-10-CM

## 2023-07-17 DIAGNOSIS — E11.9 TYPE 2 DIABETES MELLITUS WITHOUT COMPLICATION, WITHOUT LONG-TERM CURRENT USE OF INSULIN (HCC): ICD-10-CM

## 2023-07-17 PROCEDURE — 99309 SBSQ NF CARE MODERATE MDM 30: CPT | Performed by: NURSE PRACTITIONER

## 2023-07-17 NOTE — ASSESSMENT & PLAN NOTE
Lab Results   Component Value Date    HGBA1C 8.5 (H) 02/20/2023   HGBA1C: 8.5 (H: 2/20/2023) <= 8.2 (H: 8/2/2022) <= 9.3 (H: 04/28/2022)  Goal: < 8%  FBG range (7/1-17/2023) = 77 to 163  Continue Lantus 11 units at HS  Next HbA1C: 8/21/2023

## 2023-07-17 NOTE — ASSESSMENT & PLAN NOTE
TSH: 2.50 (WNL: 2/20/2023) <= 6.96 (H: 10/4/2022)  Continue Levothyroxine 112mcg daily  Next scheduled TSH on 8/21/2023

## 2023-07-17 NOTE — ASSESSMENT & PLAN NOTE
Goal: < 140-150/90  BP range (7/1-17/2023) = 117/58 to 160/82  ** 1 episode of SBP > 147 on this period  Continue Amlodipine to 2.5mg daily/ Lisinopril 2.5mg daily

## 2023-07-17 NOTE — ASSESSMENT & PLAN NOTE
Nursing observed increased incidence of behavioral concerns directed towards other residents - redirectable. Small increments of weight gain on the last 3 months  Continue LTCF supportive care  Code status/ Goal: DNR/DNI. Limited Intervention.  May send to [facility]

## 2023-07-17 NOTE — PROGRESS NOTES
[facility]  [address] [facility], [address], [place] [address] [address]  [phone]    NAME: Creasie Sacks  AGE: 80 y.o. SEX: female    Progress Note    [place]  POS: 32 (LT)    Assessment/Plan:    Late onset Alzheimer's disease without behavioral disturbance ([facility])  Nursing observed increased incidence of behavioral concerns directed towards other residents - redirectable. Small increments of weight gain on the last 3 months  Continue LTCF supportive care  Code status/ Goal: DNR/DNI. Limited Intervention. May send to Hospital    Type 2 diabetes mellitus without complication, without long-term current use of insulin ([facility])    Lab Results   Component Value Date    HGBA1C 8.5 (H) 02/20/2023   HGBA1C: 8.5 (H: 2/20/2023) <= 8.2 (H: 8/2/2022) <= 9.3 (H: 04/28/2022)  Goal: < 8%  FBG range (7/1-17/2023) = 77 to 163  Continue Lantus 11 units at HS  Next HbA1C: 8/21/2023    Essential hypertension  Goal: < 140-150/90  BP range (7/1-17/2023) = 117/58 to 160/82  ** 1 episode of SBP > 147 on this period  Continue Amlodipine to 2.5mg daily/ Lisinopril 2.5mg daily    Hypothyroidism due to Hashimoto's thyroiditis  TSH: 2.50 (WNL: 2/20/2023) <= 6.96 (H: 10/4/2022)  Continue Levothyroxine 112mcg daily  Next scheduled TSH on 8/21/2023      Chief complaint / Reason for visit: Follow- visit    History of Present Illness: This is an 72-year-old female patient admitted at [facility] for debility her patient is seen and examined today to follow up acute and chronic medical conditions: Late onset Alzheimer's disease, DM Type 2, HTN and Hypothyroidism.     Patient is out of bed for this visit sitting in the common room - alert, cooperative, calm, very pleasant and not in distress. Patient is verbal with clear coherent speech - oriented to name and birthday only. Patient acknowledged feeling well on this visit, " I'm good" - denies any acute medical concerns during ROS assessment including pain.  Per nursing, no acute medical concerns for this visit - described as stable and at baseline. Nursing and prior provider notes reviewed on this visit. Discussed visit with PCP and nursing staff/ supervisor. Review of Systems:  Per history of present illness, all other systems reviewed and negative. HISTORY:  Medical Hx: Reviewed, unchanged  Family Hx: Reviewed, unchanged  Soc Hx: Reviewed,  unchanged    ALLERGY: Reviewed, unchanged  Allergies   Allergen Reactions   • Other      All dairy products    • Tilactase         PHYSICAL EXAM:  Vital Signs: T97.5F -P82 -R16 BP: 136/77 SpO2: 94% RA  Weight: 161.2 lbs (7/12/2023) <= 159.8 lbs (6/5/2023) <= 157.4 lbs (5/3/2023)    General: NAD. Well appearing. No acute distress  Head: Atraumatic. Normocephalic. Ear: [place] - no hearing aids  Eye Exam: anicteric sclera, no discharge, PERRLA, No injection  Oral Exam: moist mucous membranes, no buccaloropharyngeal erythema, palatine tonsils WNL. Neck Exam: no anterior cervical lymphadenopathy noted, neck supple. Trachea midline, no carotid bruit, no masses  Cardiovascular: regular rate, regular rhythm, no murmurs, rubs, or gallops. S1 and S2  Pulmonary: no wheeze, no rhonchi, no rales. No chest tenderness. Normal chest wall expansion  Abdominal: soft, non-tender, nondistended, bowel sounds audible x 4 quadrants. No palpable hepatosplenomegaly, no tympany. : Non distended bladder. Continent. Daily BM. Extremities and skin: no edema noted, no rashes. Intact skin  Neurological: alert, cooperative and responsive, Oriented x 2. no tics, normal sensation to pressure and light touch.  moving all 4 extremities symmetrically. Ambulatory dysfunction - uses walker. Laboratory results / Imaging reviewed: Hard copy/ies in medical chart. Last labs done on Feb, 2023    Current Medications: All medications reviewed and updated in Nursing Home Chart    Please note:  This note was completed in part utilizing a voice-recognition software may have been used in the preparation of this document. Grammatical errors, random word insertion, spelling mistakes, and incomplete sentences may be an occasional consequence of the system secondary to software limitations, ambient noise and hardware issues. Occasional wrong word or "sound-alike" substitutions may have occurred due to the inherent limitations of voice recognition software. At the time of dictation, efforts were made to edit, clarify and/or correct errors. Interpretation should be guided by context. Please read the chart carefully and recognize, using context, where substitutions have occurred. If you have any questions or concerns about the context, text or information contained within the body of this dictation, please contact myself, the provider, for further clarification.       AWA Friedman  7/18/2023

## 2023-08-15 ENCOUNTER — NURSING HOME VISIT (OUTPATIENT)
Dept: GERIATRICS | Facility: OTHER | Age: 88
End: 2023-08-15
Payer: COMMERCIAL

## 2023-08-15 DIAGNOSIS — K59.00 CONSTIPATION, UNSPECIFIED CONSTIPATION TYPE: ICD-10-CM

## 2023-08-15 DIAGNOSIS — G30.1 LATE ONSET ALZHEIMER'S DISEASE WITHOUT BEHAVIORAL DISTURBANCE (HCC): Primary | ICD-10-CM

## 2023-08-15 DIAGNOSIS — F02.80 LATE ONSET ALZHEIMER'S DISEASE WITHOUT BEHAVIORAL DISTURBANCE (HCC): Primary | ICD-10-CM

## 2023-08-15 DIAGNOSIS — R26.2 AMBULATORY DYSFUNCTION: ICD-10-CM

## 2023-08-15 PROCEDURE — 99309 SBSQ NF CARE MODERATE MDM 30: CPT | Performed by: INTERNAL MEDICINE

## 2023-08-15 NOTE — ASSESSMENT & PLAN NOTE
Continue monitoring for worsening symptoms  Report at times does have behaviors  Continue with supportive care  Continue with safety measures and fall precautions  Continue current meds  DNI/DNR

## 2023-08-15 NOTE — PROGRESS NOTES
[address] home notes  LONG TERM CARE       NAME: Js Farias  AGE: 80 y.o. SEX: female    DATE OF ENCOUNTER: 8/15/2023    Assessment and Plan   Late onset Alzheimer's disease without behavioral disturbance ([address])  Continue monitoring for worsening symptoms  Report at times does have behaviors  Continue with supportive care  Continue with safety measures and fall precautions  Continue current meds  DNI/DNR      Constipation  Continue current med  No report of any issues  Continue current med  Continue monitoring symptoms     Ambulatory dysfunction  Multifactorial  Continue with safety measures  Continue with fall precautions       Chief Complaint     No new complaints    History of Present Illness     79 yo female seen for monthly visit and med renewal. Patient seen for dementia, ambulatory dysfunction and constipation. Reviewed nursing notes since last visit. PMHx     Past Medical History:   Diagnosis Date   • Alzheimer's dementia [facility])    • COVID-19 2020   • Depression    • Ground glass opacity present on imaging of lung 2020   • HLD (hyperlipidemia)    • Hypothyroid    • Low O2 saturation 2020   • Neurocognitive disorder    • T2DM (type 2 diabetes mellitus) ([address])      Past Surgical History:   Procedure Laterality Date   •  SECTION     • HYSTERECTOMY       Family History   Problem Relation Age of Onset   • Stomach cancer Mother    • No Known Problems Father      Social History     Socioeconomic History   • Marital status:       Spouse name: Not on file   • Number of children: Not on file   • Years of education: Not on file   • Highest education level: Not on file   Occupational History   • Not on file   Tobacco Use   • Smoking status: Never   • Smokeless tobacco: Never   Vaping Use   • Vaping Use: Never used   Substance and Sexual Activity   • Alcohol use: Not Currently   • Drug use: Not Currently   • Sexual activity: Not Currently   Other Topics Concern   • Not on file   Social History Narrative   • Not on file     Social Determinants of Health     Financial Resource Strain: Not on file   Food Insecurity: Not on file   Transportation Needs: Not on file   Physical Activity: Not on file   Stress: Not on file   Social Connections: Not on file   Intimate Partner Violence: Not on file   Housing Stability: Not on file     Allergies   Allergen Reactions   • Other      All dairy products    • Tilactase        Review of Systems     Denies any pain or shortness of breath   All other review of system negative      Objective   Vital signs reviewed from facility records. PHYSICAL EXAM:  GENERAL: no acute distress  SKIN: warm, dry, no rash, no cyanosis  HEENT: normocephalic, atraumatic, no JVD, no Thyromegaly, no lymphadenopathy  LUNGS: CTA, no wheezing, no rales, expanded equally, no chest tenderness   HEART: normal rhythm, normal rate, no murmur, no gallop  ABDOMEN: soft non tender non distended bs+, no guarding or rebound tenderness  :  no suprapubic tenderness  MUSCULOSKELETAL: strength about 4+/5 all extremities, no calf tenderness  NEUROLOGY: awake, alert, CN2-12 intact. PSYCH: cooperative, pleasant. Pertinent Laboratory/Diagnostic Studies:  Recent labs and diagnostic tests reviewed in nursing home EMR    Current Medications   Medications reviewed from nursing home EMR.         Ngozi Dumas MD

## 2023-09-15 ENCOUNTER — NURSING HOME VISIT (OUTPATIENT)
Dept: GERIATRICS | Facility: OTHER | Age: 88
End: 2023-09-15
Payer: COMMERCIAL

## 2023-09-15 VITALS
SYSTOLIC BLOOD PRESSURE: 139 MMHG | OXYGEN SATURATION: 98 % | HEART RATE: 83 BPM | TEMPERATURE: 97.3 F | DIASTOLIC BLOOD PRESSURE: 67 MMHG

## 2023-09-15 DIAGNOSIS — I10 ESSENTIAL HYPERTENSION: ICD-10-CM

## 2023-09-15 DIAGNOSIS — F02.80 LATE ONSET ALZHEIMER'S DISEASE WITHOUT BEHAVIORAL DISTURBANCE (HCC): Primary | ICD-10-CM

## 2023-09-15 DIAGNOSIS — E03.8 HYPOTHYROIDISM DUE TO HASHIMOTO'S THYROIDITIS: ICD-10-CM

## 2023-09-15 DIAGNOSIS — E11.9 TYPE 2 DIABETES MELLITUS WITHOUT COMPLICATION, WITHOUT LONG-TERM CURRENT USE OF INSULIN (HCC): ICD-10-CM

## 2023-09-15 DIAGNOSIS — G30.1 LATE ONSET ALZHEIMER'S DISEASE WITHOUT BEHAVIORAL DISTURBANCE (HCC): Primary | ICD-10-CM

## 2023-09-15 DIAGNOSIS — E06.3 HYPOTHYROIDISM DUE TO HASHIMOTO'S THYROIDITIS: ICD-10-CM

## 2023-09-15 PROCEDURE — 99309 SBSQ NF CARE MODERATE MDM 30: CPT

## 2023-09-15 NOTE — ASSESSMENT & PLAN NOTE
TSH 8/23 11.32  Synthroid was increased from 100 mcg to 112 mcg daily in am   Repeat TSH with free T4 in 4-6 weeks

## 2023-09-15 NOTE — PROGRESS NOTES
[address]  [address] [facility], [address] [place] [address] [id number]  POS 32  LTC Follow-up    NAME: London Martinez  AGE: 80 y.o. SEX: female  : 1935     DATE: 9/15/2023    CODE STATUS:  DNR     Assessment and Plan:     Problem List Items Addressed This Visit        Endocrine    Type 2 diabetes mellitus without complication, without long-term current use of insulin ([address])     A1c  8.1, Goal A1c <8  BS log reviewed, trending 100-160  Continue lantus 11 u QHS  Continue accuchecks          Hypothyroidism due to Hashimoto's thyroiditis     TSH  11.32  Synthroid was increased from 100 mcg to 112 mcg daily in am   Repeat TSH with free T4 in 4-6 weeks             Cardiovascular and Mediastinum    Essential hypertension     BP at goal  Goal BP <140/90  Continue lisinopril 2.5 mg daily  Continue amlodipine 2.5 mg daily  Monitor BP            Nervous and Auditory    Late onset Alzheimer's disease without behavioral disturbance (HCC) - Primary     Redirection, reorientation and distraction as appropriate   Fall/safety precautions  Supportive care, assistance with ADLs   Avoid deliriogenic medications   Encourage adequate hydration and nutrition   Maintain sleep/wake cycle                  History of Present Illness:     Patient is a 80 yr old female being seen at [address] [place] [address] for follow up of acute and chronic medical conditions. She is doing well at LTC facility. Nursing without acute concerns. Patient is pleasant and cooperative on exam. She is in no acute distress.        The following portions of the patient's history were reviewed and updated as appropriate: allergies, current medications, past family history, past medical history, past social history, past surgical history and problem list.     Review of Systems:     Review of Systems   Unable to perform ROS: Dementia        Problem List:     Patient Active Problem List   Diagnosis   • Type 2 diabetes mellitus without complication, without long-term current use of insulin (HCC)   • Mixed hyperlipidemia   • Late onset Alzheimer's disease without behavioral disturbance (HCC)   • Essential hypertension   • Hypothyroidism due to Hashimoto's thyroiditis   • Constipation   • Failure to thrive in adult   • Ambulatory dysfunction   • Gastroesophageal reflux disease without esophagitis        Objective:     /67   Pulse 83   Temp (!) 97.3 °F (36.3 °C)   SpO2 98%     Physical Exam  Vitals and nursing note reviewed. Constitutional:       General: She is not in acute distress. Appearance: She is well-developed. HENT:      Head: Normocephalic and atraumatic. Eyes:      Conjunctiva/sclera: Conjunctivae normal.   Cardiovascular:      Rate and Rhythm: Normal rate and regular rhythm. Heart sounds: No murmur heard. Pulmonary:      Effort: Pulmonary effort is normal. No respiratory distress. Breath sounds: Normal breath sounds. Abdominal:      Palpations: Abdomen is soft. Tenderness: There is no abdominal tenderness. Musculoskeletal:         General: No swelling. Cervical back: Neck supple. Skin:     General: Skin is warm and dry. Capillary Refill: Capillary refill takes less than 2 seconds. Neurological:      General: No focal deficit present. Mental Status: She is alert. Mental status is at baseline. Psychiatric:         Mood and Affect: Mood normal.         Behavior: Behavior normal.         Pertinent Laboratory/Diagnostic Studies:    Laboratory Results: I have personally reviewed the pertinent laboratory results/reports     Radiology/Other Diagnostic Testing Results: I have personally reviewed pertinent reports.       Tara Walton, [facility]

## 2023-09-15 NOTE — ASSESSMENT & PLAN NOTE
A1c 8/23 8.1, Goal A1c <8  BS log reviewed, trending 100-160  Continue lantus 11 u QHS  Continue accuchecks

## 2023-09-15 NOTE — ASSESSMENT & PLAN NOTE
BP at goal  Goal BP <140/90  Continue lisinopril 2.5 mg daily  Continue amlodipine 2.5 mg daily  Monitor BP

## 2023-10-17 ENCOUNTER — NURSING HOME VISIT (OUTPATIENT)
Dept: GERIATRICS | Facility: OTHER | Age: 88
End: 2023-10-17
Payer: COMMERCIAL

## 2023-10-17 DIAGNOSIS — K21.9 GASTROESOPHAGEAL REFLUX DISEASE WITHOUT ESOPHAGITIS: Primary | ICD-10-CM

## 2023-10-17 DIAGNOSIS — I10 ESSENTIAL HYPERTENSION: ICD-10-CM

## 2023-10-17 DIAGNOSIS — K59.00 CONSTIPATION, UNSPECIFIED CONSTIPATION TYPE: ICD-10-CM

## 2023-10-17 PROCEDURE — 99309 SBSQ NF CARE MODERATE MDM 30: CPT | Performed by: INTERNAL MEDICINE

## 2023-10-17 NOTE — ASSESSMENT & PLAN NOTE
BP reviewed from facility records, few elevated reading but mostly acceptable range  Continue current meds  Continue monitoring BP

## 2023-10-17 NOTE — PROGRESS NOTES
[address] [facility] notes  LONG TERM CARE       NAME: Raudel Wiggins  AGE: 80 y.o. SEX: female    DATE OF ENCOUNTER: 10/17/2023    Assessment and Plan   Gastroesophageal reflux disease without esophagitis  Seems stable  Not on meds   Continue monitoring symptoms     Essential hypertension  BP reviewed from facility records, few elevated reading but mostly acceptable range  Continue current meds  Continue monitoring BP    Constipation  No report of any issues  Continue current meds  Continue monitoring symptoms         Chief Complaint     No new complaints    History of Present Illness     79 yo female seen for monthly visit and med renewal. Patient seen for GERD, HTN and constipation. Reviewed nursing notes since last visit. PMHx     Past Medical History:   Diagnosis Date    Alzheimer's dementia ([address] [facility])     COVID-19 2020    Depression     Ground glass opacity present on imaging of lung 2020    HLD (hyperlipidemia)     Hypothyroid     Low O2 saturation 2020    Neurocognitive disorder     T2DM (type 2 diabetes mellitus) ([address] [facility])      Past Surgical History:   Procedure Laterality Date     SECTION      HYSTERECTOMY       Family History   Problem Relation Age of Onset    Stomach cancer Mother     No Known Problems Father      Social History     Socioeconomic History    Marital status:       Spouse name: Not on file    Number of children: Not on file    Years of education: Not on file    Highest education level: Not on file   Occupational History    Not on file   Tobacco Use    Smoking status: Never    Smokeless tobacco: Never   Vaping Use    Vaping Use: Never used   Substance and Sexual Activity    Alcohol use: Not Currently    Drug use: Not Currently    Sexual activity: Not Currently   Other Topics Concern    Not on file   Social History Narrative    Not on file     Social Determinants of Health     Financial Resource Strain: Not on file   Food Insecurity: Not on file Transportation Needs: Not on file   Physical Activity: Not on file   Stress: Not on file   Social Connections: Not on file   Intimate Partner Violence: Not on file   Housing Stability: Not on file     Allergies   Allergen Reactions    Other      All dairy products     Tilactase        Review of Systems     Denies any pain or shortness of breath   All other review of system negative      Objective   Vital signs reviewed from facility records. PHYSICAL EXAM:  GENERAL: no acute distress  SKIN: warm, dry, no rash, no cyanosis  HEENT: normocephalic, atraumatic, no JVD, no Thyromegaly, no lymphadenopathy  LUNGS: CTA, no wheezing, no rales, expanded equally, no chest tenderness   HEART: normal rhythm, normal rate, no murmur, no gallop  ABDOMEN: soft non tender non distended bs+, no guarding or rebound tenderness  :  no suprapubic tenderness  MUSCULOSKELETAL: strength about 4+/5 all extremities, ROM within normal, no calf tenderness  NEUROLOGY: awake, alert, CN2-12 intact. PSYCH: cooperative, pleasant. Pertinent Laboratory/Diagnostic Studies:  Recent labs and diagnostic tests reviewed in nursing home EMR    Current Medications   Medications reviewed from nursing home EMR.         Viji Wick MD

## 2023-11-17 ENCOUNTER — NURSING HOME VISIT (OUTPATIENT)
Dept: GERIATRICS | Facility: OTHER | Age: 88
End: 2023-11-17
Payer: COMMERCIAL

## 2023-11-17 DIAGNOSIS — F02.80 LATE ONSET ALZHEIMER'S DISEASE WITHOUT BEHAVIORAL DISTURBANCE (HCC): Primary | ICD-10-CM

## 2023-11-17 DIAGNOSIS — E06.3 HYPOTHYROIDISM DUE TO HASHIMOTO'S THYROIDITIS: ICD-10-CM

## 2023-11-17 DIAGNOSIS — E03.8 HYPOTHYROIDISM DUE TO HASHIMOTO'S THYROIDITIS: ICD-10-CM

## 2023-11-17 DIAGNOSIS — I10 ESSENTIAL HYPERTENSION: ICD-10-CM

## 2023-11-17 DIAGNOSIS — E11.9 TYPE 2 DIABETES MELLITUS WITHOUT COMPLICATION, WITHOUT LONG-TERM CURRENT USE OF INSULIN (HCC): ICD-10-CM

## 2023-11-17 DIAGNOSIS — G30.1 LATE ONSET ALZHEIMER'S DISEASE WITHOUT BEHAVIORAL DISTURBANCE (HCC): Primary | ICD-10-CM

## 2023-11-17 PROCEDURE — 99309 SBSQ NF CARE MODERATE MDM 30: CPT | Performed by: NURSE PRACTITIONER

## 2023-11-29 NOTE — PROGRESS NOTES
[place] [address] [address] [facility], [address], [place] [address] [address]  [phone]    NAME: Francine Arguello  AGE: 80 y.o. SEX: female    Progress Note    Location: [facility]  POS: 28 (LTC)    Assessment/Plan:    Late onset Alzheimer's disease without behavioral disturbance ([address] [facility])  Stable per nursing - no recent behavioral concerns observed since with different room mates  Stable weight on review. NO anemia/ Renal and Hepatic functions WNL (Aug, 2023)  TSH elevated: 9.02 (10/13/2023)  Continue LTCF supportive care  Code status/ Goal: DNR/DNI. Limited Intervention. May send to Hospital    Type 2 diabetes mellitus without complication, without long-term current use of insulin ([facility])  HGBA1C: 8.1 (H: 8/21/2023) <= 8.5 (H: 2/20/2023) <= 8.2 (H: 8/2/2022)  Goal: < 8%  FBG range (11/1-17/2023) = 96 to 152  Continue Lantus 11 units at HS  Next HbA1C: Feb, 2024    Essential hypertension  Goal: < 140-150/90  BP range (11/1-17/2023) = 114/73 to 1560/70   Continue Amlodipine to 2.5mg daily/ Lisinopril 2.5mg daily    Hypothyroidism due to Hashimoto's thyroiditis  TSH: 9.02 (H:10/13/2023) <= 2.50 (WNL: 2/20/2023) <= 6.96 (H: 10/4/2022)  Free T4 (10/13/2023) = 0.89 (WNL)  Continue Levothyroxine 112mcg daily (since (9/7/2022)  Repeat TSH on Feb, 2024    Chief complaint / Reason for visit: Follow- visit    History of Present Illness: This is an 80-year-old female patient residing at [facility]. Patient is seen and examined today to follow up acute and chronic medical conditions. Patient is out of bed for this visit sitting in the common room - alert, cooperative, calm, very pleasant and not in distress. Patient is verbal with clear coherent speech - oriented to name and birthday only. Patient acknowledged feeling well on this visit, " I'm good" - denies any acute medical concerns during ROS assessment including pain. Per nursing, no acute medical concerns for this visit - described as stable and at baseline.     Nursing and prior provider notes reviewed on this visit. Discussed visit with PCP and nursing staff/ supervisor. Review of Systems:  Per history of present illness, all other systems reviewed and negative. HISTORY:  Medical Hx: Reviewed, unchanged  Family Hx: Reviewed, unchanged  Soc Hx: Reviewed,  unchanged    ALLERGY: Reviewed, unchanged  Allergies   Allergen Reactions    Other      All dairy products     Tilactase         PHYSICAL EXAM:  Vital Signs: T97.7F -P78 -R16 BP: 114/73 SpO2: 98% RA  Weight: 156.0 lnns (11/9/2023) <= 157.2 lbs (10/6/2023) <= 157.4 lbs (9/21/2023)    General: NAD. Well appearing. No acute distress  Head: Atraumatic. Normocephalic. Ear: [place] - no hearing aids  Eye Exam: anicteric sclera, no discharge, PERRLA, No injection  Oral Exam: moist mucous membranes, no buccaloropharyngeal erythema, palatine tonsils WNL. Neck Exam: no anterior cervical lymphadenopathy noted, neck supple. Trachea midline, no carotid bruit, no masses  Cardiovascular: regular rate, regular rhythm, no murmurs, rubs, or gallops. S1 and S2  Pulmonary: no wheeze, no rhonchi, no rales. No chest tenderness. Normal chest wall expansion  Abdominal: soft, non-tender, nondistended, bowel sounds audible x 4 quadrants. No palpable hepatosplenomegaly, no tympany. : Non distended bladder. Continent. Daily BM. Extremities and skin: no edema noted, no rashes. Intact skin  Neurological: alert, cooperative and responsive, Oriented x 2. no tics, normal sensation to pressure and light touch.  moving all 4 extremities symmetrically. Ambulatory dysfunction - uses walker. Laboratory results / Imaging reviewed: Hard copy/ies in medical chart:    TSH (10/13/2023) = 9.02 (H)  Free T4 (10/13/2023) = 0.89 (WNL)    Current Medications: All medications reviewed and updated in Nursing Home Chart    Please note: This note was completed in part utilizing a voice-recognition software may have been used in the preparation of this document. Grammatical errors, random word insertion, spelling mistakes, and incomplete sentences may be an occasional consequence of the system secondary to software limitations, ambient noise and hardware issues. Occasional wrong word or "sound-alike" substitutions may have occurred due to the inherent limitations of voice recognition software. At the time of dictation, efforts were made to edit, clarify and/or correct errors. Interpretation should be guided by context. Please read the chart carefully and recognize, using context, where substitutions have occurred. If you have any questions or concerns about the context, text or information contained within the body of this dictation, please contact myself, the provider, for further clarification.       AWA Dobson  11/28/2023

## 2023-11-29 NOTE — ASSESSMENT & PLAN NOTE
Stable per nursing - no recent behavioral concerns observed since with different room mates  Stable weight on review. NO anemia/ Renal and Hepatic functions WNL (Aug, 2023)  TSH elevated: 9.02 (10/13/2023)  Continue LTCF supportive care  Code status/ Goal: DNR/DNI. Limited Intervention.  May send to [facility]

## 2023-11-29 NOTE — ASSESSMENT & PLAN NOTE
Goal: < 140-150/90  BP range (11/1-17/2023) = 114/73 to 1560/70   Continue Amlodipine to 2.5mg daily/ Lisinopril 2.5mg daily

## 2023-11-29 NOTE — ASSESSMENT & PLAN NOTE
HGBA1C: 8.1 (H: 8/21/2023) <= 8.5 (H: 2/20/2023) <= 8.2 (H: 8/2/2022)  Goal: < 8%  FBG range (11/1-17/2023) = 96 to 152  Continue Lantus 11 units at HS  Next HbA1C: Feb, 2024

## 2023-11-29 NOTE — ASSESSMENT & PLAN NOTE
TSH: 9.02 (H:10/13/2023) <= 2.50 (WNL: 2/20/2023) <= 6.96 (H: 10/4/2022)  Free T4 (10/13/2023) = 0.89 (WNL)  Continue Levothyroxine 112mcg daily (since (9/7/2022)  Repeat TSH on Feb, 2024

## 2023-12-19 ENCOUNTER — NURSING HOME VISIT (OUTPATIENT)
Dept: GERIATRICS | Facility: OTHER | Age: 88
End: 2023-12-19
Payer: COMMERCIAL

## 2023-12-19 DIAGNOSIS — G30.1 LATE ONSET ALZHEIMER'S DISEASE WITHOUT BEHAVIORAL DISTURBANCE (HCC): Primary | ICD-10-CM

## 2023-12-19 DIAGNOSIS — E78.2 MIXED HYPERLIPIDEMIA: ICD-10-CM

## 2023-12-19 DIAGNOSIS — E11.9 TYPE 2 DIABETES MELLITUS WITHOUT COMPLICATION, WITHOUT LONG-TERM CURRENT USE OF INSULIN (HCC): ICD-10-CM

## 2023-12-19 DIAGNOSIS — F02.80 LATE ONSET ALZHEIMER'S DISEASE WITHOUT BEHAVIORAL DISTURBANCE (HCC): Primary | ICD-10-CM

## 2023-12-19 DIAGNOSIS — K59.00 CONSTIPATION, UNSPECIFIED CONSTIPATION TYPE: ICD-10-CM

## 2023-12-19 PROCEDURE — 99309 SBSQ NF CARE MODERATE MDM 30: CPT | Performed by: INTERNAL MEDICINE

## 2023-12-19 NOTE — ASSESSMENT & PLAN NOTE
Lab Results   Component Value Date    HGBA1C 8.1 (H) 08/21/2023     Stable   Would prefer below 8  Continue current meds  Continue monitoring blood sugars

## 2023-12-19 NOTE — ASSESSMENT & PLAN NOTE
Continue with current meds  Continue with supportive care  Continue with safety measures  Continue with fall precautions  Continue monitoring for worsening symptoms

## 2023-12-19 NOTE — PROGRESS NOTES
[facility] notes  LONG TERM CARE       NAME: Farida Whitaker  AGE: 88 y.o. SEX: female    DATE OF ENCOUNTER: 2023    Assessment and Plan   Late onset Alzheimer's disease without behavioral disturbance (HCC)  Continue with current meds  Continue with supportive care  Continue with safety measures  Continue with fall precautions  Continue monitoring for worsening symptoms     Type 2 diabetes mellitus without complication, without long-term current use of insulin (HCC)    Lab Results   Component Value Date    HGBA1C 8.1 (H) 2023     Stable   Would prefer below 8  Continue current meds  Continue monitoring blood sugars       Mixed hyperlipidemia  Lipid panel in Aug was good range  Will monitor for now  Will not start any meds     Constipation  Seems stable  No complaints   Continue current meds  Continue monitoring symptoms         Chief Complaint     No new complaints    History of Present Illness     87 yo female seen for monthly visit and med renewal. Patient seen for dementia, DMII, HLD and constipation. Reviewed nursing notes since last visit.     PMHx     Past Medical History:   Diagnosis Date    Alzheimer's dementia (HCC)     COVID-19 2020    Depression     Ground glass opacity present on imaging of lung 2020    HLD (hyperlipidemia)     Hypothyroid     Low O2 saturation 2020    Neurocognitive disorder     T2DM (type 2 diabetes mellitus) (HCC)      Past Surgical History:   Procedure Laterality Date     SECTION      HYSTERECTOMY       Family History   Problem Relation Age of Onset    Stomach cancer Mother     No Known Problems Father      Social History     Socioeconomic History    Marital status:      Spouse name: Not on file    Number of children: Not on file    Years of education: Not on file    Highest education level: Not on file   Occupational History    Not on file   Tobacco Use    Smoking status: Never    Smokeless tobacco: Never   Vaping Use     Vaping status: Never Used   Substance and Sexual Activity    Alcohol use: Not Currently    Drug use: Not Currently    Sexual activity: Not Currently   Other Topics Concern    Not on file   Social History Narrative    Not on file     Social Determinants of Health     Financial Resource Strain: Not on file   Food Insecurity: Not on file   Transportation Needs: Not on file   Physical Activity: Not on file   Stress: Not on file   Social Connections: Not on file   Intimate Partner Violence: Not on file   Housing Stability: Not on file     Allergies   Allergen Reactions    Other      All dairy products     Tilactase        Review of Systems     Denies any pain or shortness of breath   All other review of system negative      Objective   Vital signs reviewed from facility records.     PHYSICAL EXAM:  GENERAL: no acute distress  SKIN: warm, dry, no rash, no cyanosis  HEENT: normocephalic, atraumatic, no JVD, no Thyromegaly, no lymphadenopathy  LUNGS: CTA, no wheezing, no rales, expanded equally, no chest tenderness   HEART: normal rhythm, normal rate, no murmur, no gallop  ABDOMEN: soft non tender non distended bs+, no guarding or rebound tenderness  :  no suprapubic tenderness  MUSCULOSKELETAL: strength about 4+/5 all extremities, no calf tenderness  NEUROLOGY: awake, alert, CN2-12 intact.   PSYCH: cooperative, pleasant.       Pertinent Laboratory/Diagnostic Studies:  Recent labs and diagnostic tests reviewed in nursing home EMR    Current Medications   Medications reviewed from nursing home EMR.        ESA SANCHEZ MD

## 2024-01-18 ENCOUNTER — NURSING HOME VISIT (OUTPATIENT)
Dept: GERIATRICS | Facility: OTHER | Age: 89
End: 2024-01-18
Payer: MEDICARE

## 2024-01-18 DIAGNOSIS — Z79.4 TYPE 2 DIABETES MELLITUS WITHOUT COMPLICATION, WITH LONG-TERM CURRENT USE OF INSULIN (HCC): ICD-10-CM

## 2024-01-18 DIAGNOSIS — E03.8 HYPOTHYROIDISM DUE TO HASHIMOTO'S THYROIDITIS: ICD-10-CM

## 2024-01-18 DIAGNOSIS — E06.3 HYPOTHYROIDISM DUE TO HASHIMOTO'S THYROIDITIS: ICD-10-CM

## 2024-01-18 DIAGNOSIS — E11.9 TYPE 2 DIABETES MELLITUS WITHOUT COMPLICATION, WITH LONG-TERM CURRENT USE OF INSULIN (HCC): ICD-10-CM

## 2024-01-18 DIAGNOSIS — G30.1 LATE ONSET ALZHEIMER'S DISEASE WITHOUT BEHAVIORAL DISTURBANCE (HCC): Primary | ICD-10-CM

## 2024-01-18 DIAGNOSIS — F02.80 LATE ONSET ALZHEIMER'S DISEASE WITHOUT BEHAVIORAL DISTURBANCE (HCC): Primary | ICD-10-CM

## 2024-01-18 DIAGNOSIS — I10 ESSENTIAL HYPERTENSION: ICD-10-CM

## 2024-01-18 PROCEDURE — 99309 SBSQ NF CARE MODERATE MDM 30: CPT | Performed by: NURSE PRACTITIONER

## 2024-01-22 PROBLEM — Z79.4 TYPE 2 DIABETES MELLITUS WITHOUT COMPLICATION, WITH LONG-TERM CURRENT USE OF INSULIN (HCC): Status: ACTIVE | Noted: 2018-11-09

## 2024-01-22 NOTE — ASSESSMENT & PLAN NOTE
Lab Results   Component Value Date    HGBA1C 8.1 (H) 08/21/2023   HGBA1C: 8.1 (H: 8/21/2023) <= 8.5 (H: 2/20/2023)  Goal: < 8%  Ave FBG reading Dec/23-Jan/24): less than 120-125  Continue Lantus 11 units at HS  Next HbA1C: 2/21/2024.

## 2024-01-22 NOTE — PROGRESS NOTES
"[facility]  [address] [place] [address] Rd, [address], [place] [address]  [phone]    NAME: Farida Whitaker  AGE: 88 y.o. SEX: female    Progress Note    Location: [facility]  POS: 32 ([facility])    Assessment/Plan:    Late onset Alzheimer's disease without behavioral disturbance (HCC)  Stable.  Weight stable on review.  Continue LTCF supportive care  Code status/ Goal: DNR/DNI. Limited Intervention. May send to Hospital    Type 2 diabetes mellitus without complication, with long-term current use of insulin (HCC)    Lab Results   Component Value Date    HGBA1C 8.1 (H) 08/21/2023   HGBA1C: 8.1 (H: 8/21/2023) <= 8.5 (H: 2/20/2023)  Goal: < 8%  Ave FBG reading Dec/23-Jan/24): less than 120-125  Continue Lantus 11 units at HS  Next HbA1C: 2/21/2024.    Essential hypertension  Goal: < 140-150/90  BP/HR (Dec/23-Jan/24) reviewed and within goal  Continue Amlodipine to 2.5mg daily/ Lisinopril 2.5mg daily  Labs due on 2/21/24: CBC wo diff, CMP    Hypothyroidism due to Hashimoto's thyroiditis  TSH: 9.02 (H:10/13/2023) <= 11.32 (H: 8/21/2023)  Free T4 (10/13/2023) = 0.89 (WNL)  Continue Levothyroxine 112mcg daily (since (9/7/2022)  Repeat TSH on 2/21/2024    Chief complaint / Reason for visit:  Follow- visit    History of Present Illness:  This is an 88-year-old female patient residing at [facility].  Patient is seen and examined today to follow-up acute on chronic medical conditions.  Patient still in bed for this visit -well dressed -alert, cooperative, calm, pleasant and not in distress.  Patient is verbal with clear coherent speech -oriented x 2.  Patient acknowledged feeling well on this visit, \" I'm good\" -denies any acute medical concerns during ROS assessment including pain.  Per nursing, no acute medical concerns for this visit.    Nursing and prior provider notes reviewed on this visit. Discussed visit with PCP and nursing staff/ supervisor.    Review of Systems:  Per history of present illness, all other systems " "reviewed and negative.    HISTORY:  Medical Hx: Reviewed, unchanged  Family Hx: Reviewed, unchanged  Soc Hx: Reviewed,  unchanged    ALLERGY: Reviewed, unchanged.   Allergies   Allergen Reactions    Other      All dairy products     Tilactase         PHYSICAL EXAM:  Vital Signs: T97.5F -P70 -R16 BP: 137/75 Spo2: 96% RA  Weight: 154.5 lbs (1/4/2024) <= 153.2 lbs (12/5/2023) <= 156.0 lbs (11/9/2023)    General: NAD. Well appearing. No acute distress  Head: Atraumatic. Normocephalic.  Ear: [place] IRA  Eye Exam: anicteric sclera, no discharge, PERRLA, No injection  Oral Exam: moist mucous membranes, no buccaloropharyngeal erythema, palatine tonsils WNL.  Neck Exam: no anterior cervical lymphadenopathy noted, neck supple. Trachea midline, no carotid bruit, no masses  Cardiovascular: regular rate, regular rhythm, no: murmurs/ rubs/ gallops. S1 and S2 appreciated.  Pulmonary: no wheeze, no rhonchi, no rales. No chest tenderness. Normal chest wall expansion  Abdominal: soft, non-tender, nondistended, bowel sounds audible x 4 quadrants. No palpable hepatosplenomegaly, no tympany  : Non distended bladder. Continent.  Extremities and skin: no edema noted, no rashes. Intact skin  Neurological: alert, cooperative and responsive, Oriented x 2. no tics, normal sensation to pressure and light touch.  moving all 4 extremities symmetrically. Uses walker    Laboratory / Imaging results reviewed. Last labs done on Oct, 2023.    Current Medications: All medications reviewed and updated in Nursing Home eMAR.    Please note: This note was completed in part utilizing a voice-recognition software may have been used in the preparation of this document. Grammatical errors, random word insertion, spelling mistakes, and incomplete sentences may be an occasional consequence of the system secondary to software limitations, ambient noise and hardware issues. Occasional wrong word or \"sound-alike\" substitutions may have occurred due to the inherent " limitations of voice recognition software. At the time of dictation, efforts were made to edit, clarify and/or correct errors. Interpretation should be guided by context. Please read the chart carefully and recognize, using context, where substitutions have occurred. If you have any questions or concerns about the context, text or information contained within the body of this dictation, please contact myself, the provider, for further clarification.      AWA Up  1/22/2024

## 2024-01-22 NOTE — ASSESSMENT & PLAN NOTE
Stable.  Weight stable on review.  Continue LTCF supportive care  Code status/ Goal: DNR/DNI. Limited Intervention. May send to Hospital

## 2024-01-22 NOTE — ASSESSMENT & PLAN NOTE
TSH: 9.02 (H:10/13/2023) <= 11.32 (H: 8/21/2023)  Free T4 (10/13/2023) = 0.89 (WNL)  Continue Levothyroxine 112mcg daily (since (9/7/2022)  Repeat TSH on 2/21/2024

## 2024-01-22 NOTE — ASSESSMENT & PLAN NOTE
Goal: < 140-150/90  BP/HR (Dec/23-Jan/24) reviewed and within goal  Continue Amlodipine to 2.5mg daily/ Lisinopril 2.5mg daily  Labs due on 2/21/24: CBC wo diff, CMP

## 2024-02-08 ENCOUNTER — NURSING HOME VISIT (OUTPATIENT)
Dept: GERIATRICS | Facility: OTHER | Age: 89
End: 2024-02-08
Payer: MEDICARE

## 2024-02-08 DIAGNOSIS — K59.00 CONSTIPATION, UNSPECIFIED CONSTIPATION TYPE: ICD-10-CM

## 2024-02-08 DIAGNOSIS — I10 ESSENTIAL HYPERTENSION: Primary | ICD-10-CM

## 2024-02-08 DIAGNOSIS — E73.9 LACTOSE INTOLERANCE: ICD-10-CM

## 2024-02-08 DIAGNOSIS — K21.9 GASTROESOPHAGEAL REFLUX DISEASE WITHOUT ESOPHAGITIS: ICD-10-CM

## 2024-02-08 PROCEDURE — 99309 SBSQ NF CARE MODERATE MDM 30: CPT | Performed by: INTERNAL MEDICINE

## 2024-02-08 NOTE — ASSESSMENT & PLAN NOTE
BP reviewed from facility records, acceptable range  Continue current meds  Continue monitoring BP

## 2024-02-08 NOTE — PROGRESS NOTES
[facility] notes  LONG TERM CARE       NAME: Farida Whitaker  AGE: 88 y.o. SEX: female    DATE OF ENCOUNTER: 2024    Assessment and Plan   Essential hypertension  BP reviewed from facility records, acceptable range  Continue current meds  Continue monitoring BP    Gastroesophageal reflux disease without esophagitis  Seems stable  Off of meds  Continue monitoring symptoms     Constipation  No report of any issues  Continue monitoring symptoms  Continue current meds     Lactose intolerance  Continue lactaid pills  Continue monitoring symptoms         Chief Complaint     No new complaints    History of Present Illness     89 yo female seen for monthly visit and med renewal. Patient seen for HTN, GERD, Constipation and lactose intolerance. Reviewed nursing notes since last visit.     PMHx     Past Medical History:   Diagnosis Date    Alzheimer's dementia (HCC)     COVID-19 2020    Depression     Ground glass opacity present on imaging of lung 2020    HLD (hyperlipidemia)     Hypothyroid     Low O2 saturation 2020    Neurocognitive disorder     T2DM (type 2 diabetes mellitus) ([facility])      Past Surgical History:   Procedure Laterality Date     SECTION      HYSTERECTOMY       Family History   Problem Relation Age of Onset    Stomach cancer Mother     No Known Problems Father      Social History     Socioeconomic History    Marital status:      Spouse name: Not on file    Number of children: Not on file    Years of education: Not on file    Highest education level: Not on file   Occupational History    Not on file   Tobacco Use    Smoking status: Never    Smokeless tobacco: Never   Vaping Use    Vaping status: Never Used   Substance and Sexual Activity    Alcohol use: Not Currently    Drug use: Not Currently    Sexual activity: Not Currently   Other Topics Concern    Not on file   Social History Narrative    Not on file     Social Determinants of Health     Financial  Resource Strain: Not on file   Food Insecurity: Not on file   Transportation Needs: Not on file   Physical Activity: Not on file   Stress: Not on file   Social Connections: Not on file   Intimate Partner Violence: Not on file   Housing Stability: Not on file     Allergies   Allergen Reactions    Other      All dairy products     Tilactase        Review of Systems     Denies any pain or shortness of breath  All other review of system negative      Objective   Vital signs reviewed from facility records.     PHYSICAL EXAM:  GENERAL: no acute distress  SKIN: warm, dry, no rash, no cyanosis  HEENT: normocephalic, atraumatic, no JVD, no Thyromegaly, no lymphadenopathy  LUNGS: CTA, no wheezing, no rales, expanded equally, no chest tenderness   HEART: normal rhythm, normal rate, no murmur, no gallop  ABDOMEN: soft non tender non distended bs+, no guarding or rebound tenderness  :  no suprapubic tenderness  MUSCULOSKELETAL: strength about 4+/5 all extremities, no calf tenderness  NEUROLOGY: awake, alert, CN2-12 intact.   PSYCH: cooperative, pleasant.       Pertinent Laboratory/Diagnostic Studies:  Recent labs and diagnostic tests reviewed in nursing home EMR    Current Medications   Medications reviewed from nursing home EMR.        ESA SANCHEZ MD

## 2024-03-13 ENCOUNTER — NURSING HOME VISIT (OUTPATIENT)
Dept: GERIATRICS | Facility: OTHER | Age: 89
End: 2024-03-13
Payer: MEDICARE

## 2024-03-13 DIAGNOSIS — I10 ESSENTIAL HYPERTENSION: ICD-10-CM

## 2024-03-13 DIAGNOSIS — Z79.4 TYPE 2 DIABETES MELLITUS WITHOUT COMPLICATION, WITH LONG-TERM CURRENT USE OF INSULIN (HCC): ICD-10-CM

## 2024-03-13 DIAGNOSIS — E11.9 TYPE 2 DIABETES MELLITUS WITHOUT COMPLICATION, WITH LONG-TERM CURRENT USE OF INSULIN (HCC): ICD-10-CM

## 2024-03-13 DIAGNOSIS — E03.8 HYPOTHYROIDISM DUE TO HASHIMOTO'S THYROIDITIS: ICD-10-CM

## 2024-03-13 DIAGNOSIS — F02.80 LATE ONSET ALZHEIMER'S DISEASE WITHOUT BEHAVIORAL DISTURBANCE (HCC): Primary | ICD-10-CM

## 2024-03-13 DIAGNOSIS — E06.3 HYPOTHYROIDISM DUE TO HASHIMOTO'S THYROIDITIS: ICD-10-CM

## 2024-03-13 DIAGNOSIS — G30.1 LATE ONSET ALZHEIMER'S DISEASE WITHOUT BEHAVIORAL DISTURBANCE (HCC): Primary | ICD-10-CM

## 2024-03-13 PROCEDURE — 99309 SBSQ NF CARE MODERATE MDM 30: CPT | Performed by: NURSE PRACTITIONER

## 2024-04-09 ENCOUNTER — NURSING HOME VISIT (OUTPATIENT)
Dept: GERIATRICS | Facility: OTHER | Age: 89
End: 2024-04-09
Payer: MEDICARE

## 2024-04-09 DIAGNOSIS — K59.00 CONSTIPATION, UNSPECIFIED CONSTIPATION TYPE: ICD-10-CM

## 2024-04-09 DIAGNOSIS — G30.1 LATE ONSET ALZHEIMER'S DISEASE WITHOUT BEHAVIORAL DISTURBANCE (HCC): ICD-10-CM

## 2024-04-09 DIAGNOSIS — F02.80 LATE ONSET ALZHEIMER'S DISEASE WITHOUT BEHAVIORAL DISTURBANCE (HCC): ICD-10-CM

## 2024-04-09 DIAGNOSIS — I10 ESSENTIAL HYPERTENSION: Primary | ICD-10-CM

## 2024-04-09 DIAGNOSIS — E06.3 HYPOTHYROIDISM DUE TO HASHIMOTO'S THYROIDITIS: ICD-10-CM

## 2024-04-09 DIAGNOSIS — E03.8 HYPOTHYROIDISM DUE TO HASHIMOTO'S THYROIDITIS: ICD-10-CM

## 2024-04-09 PROCEDURE — 99309 SBSQ NF CARE MODERATE MDM 30: CPT | Performed by: INTERNAL MEDICINE

## 2024-04-09 RX ORDER — LEVOTHYROXINE SODIUM 0.12 MG/1
125 TABLET ORAL DAILY
COMMUNITY

## 2024-04-09 NOTE — PROGRESS NOTES
[facility] Nursing home notes  LONG TERM CARE       NAME: Farida Whitaker  AGE: 89 y.o. SEX: female    DATE OF ENCOUNTER: 2024    Assessment and Plan   Essential hypertension  BP reviewed from facility records, acceptable range   Continue current meds  Continue monitoring BP        Constipation  No report of any issues  Continue current meds  Continue monitoring symptoms    Late onset Alzheimer's disease without behavioral disturbance (HCC)  Continues to slowly decline  Continue current meds  Continues to require help with ADLs  Continue with safety measures  Continue with fall precautions  Continue to encourage fluids and oral intake       Hypothyroidism due to Hashimoto's thyroiditis  TSH elevated 13.28  Does not seem to be adjusted will increase synthroid to 125mcg daily  Repeat TSH in MAY        Chief Complaint     No new complaints but hx limited due to dementia       History of Present Illness     89 yo female seen for monthly visit and med renewal. Patient seen for HTN, constipation, dementia and hypothyroidism. Reviewed nursing notes since last visit.     PMHx     Past Medical History:   Diagnosis Date    Alzheimer's dementia (HCC)     COVID-19 2020    Depression     Ground glass opacity present on imaging of lung 2020    HLD (hyperlipidemia)     Hypothyroid     Low O2 saturation 2020    Neurocognitive disorder     T2DM (type 2 diabetes mellitus) (HCC)      Past Surgical History:   Procedure Laterality Date     SECTION      HYSTERECTOMY       Family History   Problem Relation Age of Onset    Stomach cancer Mother     No Known Problems Father      Social History     Socioeconomic History    Marital status:      Spouse name: Not on file    Number of children: Not on file    Years of education: Not on file    Highest education level: Not on file   Occupational History    Not on file   Tobacco Use    Smoking status: Never    Smokeless tobacco: Never   Vaping Use    Vaping  status: Never Used   Substance and Sexual Activity    Alcohol use: Not Currently    Drug use: Not Currently    Sexual activity: Not Currently   Other Topics Concern    Not on file   Social History Narrative    Not on file     Social Determinants of Health     Financial Resource Strain: Not on file   Food Insecurity: Not on file   Transportation Needs: Not on file   Physical Activity: Not on file   Stress: Not on file   Social Connections: Not on file   Intimate Partner Violence: Not on file   Housing Stability: Not on file     Allergies   Allergen Reactions    Other      All dairy products     Tilactase        Review of Systems     Denies any pain or shortness of breath   All other review of system negative and hx limited due to dementia       Objective   Vital signs reviewed from facility records.        PHYSICAL EXAM:  GENERAL: no acute distress  SKIN: warm, dry, no rash, no cyanosis  HEENT: normocephalic, atraumatic, no JVD, no Thyromegaly, no lymphadenopathy  LUNGS: CTA, no wheezing, no rales, expanded equally, no chest tenderness   HEART: normal rhythm, normal rate, no murmur, no gallop  ABDOMEN: soft non tender non distended bs+, no guarding or rebound tenderness  :  no suprapubic tenderness  MUSCULOSKELETAL: strength about 4+/5 all extremities, no calf tenderness  NEUROLOGY: awake, alert, CN2-12 intact.   PSYCH: cooperative, pleasant.       Pertinent Laboratory/Diagnostic Studies:  Recent labs and diagnostic tests reviewed in nursing home EMR    Current Medications   Medications reviewed from nursing home EMR.        ESA SANCHEZ MD

## 2024-04-09 NOTE — ASSESSMENT & PLAN NOTE
Continues to slowly decline  Continue current meds  Continues to require help with ADLs  Continue with safety measures  Continue with fall precautions  Continue to encourage fluids and oral intake

## 2024-04-09 NOTE — ASSESSMENT & PLAN NOTE
BP reviewed from facility records, acceptable range   Continue current meds  Continue monitoring BP

## 2024-04-09 NOTE — ASSESSMENT & PLAN NOTE
TSH elevated 13.28  Does not seem to be adjusted will increase synthroid to 125mcg daily  Repeat TSH in MAY

## 2024-04-11 RX ORDER — METFORMIN HYDROCHLORIDE 750 MG/1
750 TABLET, EXTENDED RELEASE ORAL
COMMUNITY

## 2024-04-11 NOTE — PROGRESS NOTES
[facility]  [address] [place] [address], [address], [place] [address]  [phone]    NAME: Farida Whitaker  AGE: 89 y.o. SEX: female    Progress Note    Location: [facility]  POS: 32 (LT)    Assessment/Plan:    Late onset Alzheimer's disease without behavioral disturbance (HCC)  Stable. No reported behavioral disturbance  Weight stable on the last 3 months  NO anemia (2/21/2024)  Renla and hepatic functions WNL (2/21/2024)  TSH level trends up/down  Continue LTCF supportive care  Code status/ Goal: DNR/DNI. Limited Intervention. May send to Hospital    Type 2 diabetes mellitus without complication, with long-term current use of insulin ([facility])  HGBA1C: 8.2 (2/21/2024) <= 8.1 (H: 8/21/2023)  Goal: < 8%  FBG range (3/1-13/2024) = 97 to 146  Continue Lantus 11 units at HS  Continue Metformin 750mg ER daily (started on 2/22/2024)  Next HbA1C: 5/21/2024.     Essential hypertension  Goal: < 140-150/90  BP range (3/1-13/2024) = 117/71 to 146/75   HR range (3/1-13/2024) = 68 to 72/min  Continue Amlodipine to 2.5mg daily/ Lisinopril 2.5mg daily  Renal function WNL (2/21/2024): Crea: 0.61/ BUN: 12/ eGFR: 85     Hypothyroidism due to Hashimoto's thyroiditis  TSH trends up/down: 13.28 (H: 2/21/2204) <= 9.02 (H:10/13/2023) <= 11.32 (H: 8/21/2023)  Free T4 (10/13/2023) = 0.89 (WNL)  Continue Levothyroxine 112mcg daily  Repeat TSH with Free T4 on 5/21/2024    Chief complaint / Reason for visit:  Follow- visit    History of Present Illness:  This is an 89-year-old female patient residing at [facility].  Patient is seen and examined today to follow-up acute on chronic medical conditions.  Patient is out of bed for this visit actively participating in unit activity.  Patient is alert, cooperative, calm, very pleasant and not in distress.  Patient acknowledged feeling well on this visit -denies any acute medical concerns during ROS assessment including pain.  Nursing has no acute medical concerns with this visit as well.    Nursing  and prior provider notes reviewed on this visit. Discussed visit with PCP and nursing staff/ supervisor.    Review of Systems:  Per history of present illness, all other systems reviewed and negative.    HISTORY:  Medical Hx: Reviewed, unchanged  Family Hx: Reviewed, unchanged  Soc Hx: Reviewed,  unchanged    ALLERGY: Reviewed, unchanged.   Allergies   Allergen Reactions    Other      All dairy products     Tilactase         PHYSICAL EXAM:  Vital Signs: T97.4F -P72 -R16 BP: 139/70 SpO2: 98% RA  Weight: 155.8 lbs (3/2/2024) <= 157.6 lbs (2/3/2024) <= 154.5 lbs (1/4/2024)    General: NAD. Well appearing. No acute distress  Head: Atraumatic. Normocephalic.  Ear: [place]  Eye Exam: anicteric sclera, no discharge, PERRLA, No injection  Oral Exam: moist mucous membranes, no buccaloropharyngeal erythema, palatine tonsils WNL.  Neck Exam: no anterior cervical lymphadenopathy noted, neck supple. Trachea midline, no carotid bruit, no masses  Cardiovascular: regular rate, regular rhythm, no: murmurs/ rubs/ gallops. S1 and S2 appreciated.  Pulmonary: no wheeze, no rhonchi, no rales. No chest tenderness. Normal chest wall expansion  Abdominal: soft, non-tender, nondistended, bowel sounds audible x 4 quadrants. No palpable hepatosplenomegaly, no tympany  : Non distended bladder.   Extremities and skin: no edema noted, no rashes. Intact skin  Neurological: alert, cooperative and responsive, Oriented x 2. No tics, normal sensation to pressure and light touch.  moving all 4 extremities symmetrically    Laboratory / Imaging results reviewed. Last labs done on 2/21/2024.    Current Medications: All medications reviewed and updated in Nursing Home eMAR.    Please note: This note was completed in part utilizing a voice-recognition software may have been used in the preparation of this document. Grammatical errors, random word insertion, spelling mistakes, and incomplete sentences may be an occasional consequence of the system secondary to  "software limitations, ambient noise and hardware issues. Occasional wrong word or \"sound-alike\" substitutions may have occurred due to the inherent limitations of voice recognition software. At the time of dictation, efforts were made to edit, clarify and/or correct errors. Interpretation should be guided by context. Please read the chart carefully and recognize, using context, where substitutions have occurred. If you have any questions or concerns about the context, text or information contained within the body of this dictation, please contact myself, the provider, for further clarification.      AWA Up  4/11/2024  "

## 2024-05-13 ENCOUNTER — NURSING HOME VISIT (OUTPATIENT)
Dept: GERIATRICS | Facility: OTHER | Age: 89
End: 2024-05-13
Payer: MEDICARE

## 2024-05-13 DIAGNOSIS — I10 ESSENTIAL HYPERTENSION: ICD-10-CM

## 2024-05-13 DIAGNOSIS — F02.80 LATE ONSET ALZHEIMER'S DISEASE WITHOUT BEHAVIORAL DISTURBANCE (HCC): Primary | ICD-10-CM

## 2024-05-13 DIAGNOSIS — E03.8 HYPOTHYROIDISM DUE TO HASHIMOTO'S THYROIDITIS: ICD-10-CM

## 2024-05-13 DIAGNOSIS — E11.9 TYPE 2 DIABETES MELLITUS WITHOUT COMPLICATION, WITH LONG-TERM CURRENT USE OF INSULIN (HCC): ICD-10-CM

## 2024-05-13 DIAGNOSIS — Z79.4 TYPE 2 DIABETES MELLITUS WITHOUT COMPLICATION, WITH LONG-TERM CURRENT USE OF INSULIN (HCC): ICD-10-CM

## 2024-05-13 DIAGNOSIS — G30.1 LATE ONSET ALZHEIMER'S DISEASE WITHOUT BEHAVIORAL DISTURBANCE (HCC): Primary | ICD-10-CM

## 2024-05-13 DIAGNOSIS — E06.3 HYPOTHYROIDISM DUE TO HASHIMOTO'S THYROIDITIS: ICD-10-CM

## 2024-05-13 PROCEDURE — 99309 SBSQ NF CARE MODERATE MDM 30: CPT | Performed by: NURSE PRACTITIONER

## 2024-06-02 NOTE — ASSESSMENT & PLAN NOTE
Lab Results   Component Value Date    HGBA1C 8.2 (H) 02/21/2024   Improved HGBA1C (5/21/2024): 6.5 (H) <= 8.2 (2/21/2024) <= 8.1 (H: 8/21/2023)  Goal: < 8%  FBG range (5/1-13/2024) = 81 to 132  Continue Lantus 11 units at HS / Metformin 750mg ER daily

## 2024-06-02 NOTE — ASSESSMENT & PLAN NOTE
Sudden lost of interest in most things: appetite/ socialization  Sudden weight loss since April, 2024  Anemia (4/19/2024) Hbg/Hct: 10.8/31.6 (L) <= 12.0/35.5 (WNL: 2/21/2024)  Indices WNL (4/19/2024)  Renal function (4/19/2024): Crea: 0.62/ BUN: 11/ eGFR: 85  TSH (5/21/2024): 10.87 (H) <= 13.28 (2/21/2024)  HbA1C (5/21/2024): 6.5 (H) <= (8.2 (H: 2/21/2024)  Increase Sertraline to 50mg daily  Continue Nutritional shake BID  Continue LTCF supportive care  Followed by ADM/ Palliative Care CRNP  Code status/ Goal: DNR/DNI. Limited Intervention. May send to Hospital

## 2024-06-02 NOTE — PROGRESS NOTES
[facility]  [address] [place] [address], [address], [place] [address]  [phone]    NAME: Farida Whitaker  AGE: 89 y.o. SEX: female    Progress Note    Location: [facility]  POS: 32 (LTC)    Assessment/Plan:    Late onset Alzheimer's disease without behavioral disturbance (HCC)  Sudden lost of interest in most things: appetite/ socialization  Sudden weight loss since April, 2024  Anemia (4/19/2024) Hbg/Hct: 10.8/31.6 (L) <= 12.0/35.5 (WNL: 2/21/2024)  Indices WNL (4/19/2024)  Renal function (4/19/2024): Crea: 0.62/ BUN: 11/ eGFR: 85  TSH (5/21/2024): 10.87 (H) <= 13.28 (2/21/2024)  HbA1C (5/21/2024): 6.5 (H) <= (8.2 (H: 2/21/2024)  Increase Sertraline to 50mg daily  Continue Nutritional shake BID  Continue LTCF supportive care  Followed by ADM/ Palliative Care CRNP  Code status/ Goal: DNR/DNI. Limited Intervention. May send to Hospital    Type 2 diabetes mellitus without complication, with long-term current use of insulin ([facility])    Lab Results   Component Value Date    HGBA1C 8.2 (H) 02/21/2024   Improved HGBA1C (5/21/2024): 6.5 (H) <= 8.2 (2/21/2024) <= 8.1 (H: 8/21/2023)  Goal: < 8%  FBG range (5/1-13/2024) = 81 to 132  Continue Lantus 11 units at HS / Metformin 750mg ER daily    Essential hypertension  Goal: < 140-150/90  BP range (5/1-13/2024) = 109/56 to 137/81   Continue Lisinopril 2.5mg daily    Hypothyroidism due to Hashimoto's thyroiditis  TSH trends up/down  Last TSH (5/21/2024): 10.87 (H) <= 13.28 (2/21/2024)  Free T4 (5/21/2024): 0.67 (WNL) <= 0.89 (WNL: 10/13/2023)  Continue recently adjusted Levothyroxine dose: 125mcg (adjusted on 4/10/2024) daily  TSH with Free T4 on 8/13/2024      Chief complaint / Reason for visit:  Follow- visit    History of Present Illness:  This is an 89-year-old female patient residing at [facility] patient is seen and examined today to follow-up any acute and chronic medical conditions.  Patient still in bed for this visit -alert, cooperative, calm, pleasant and not in  "distress.  Patient presents with a flat affect and mostly disinterest.  Patient reported that she does not feel like herself but \"I feel fine\" -denies any acute medical concerns during ROS assessment including pain.  Per nursing, patient has been progressively exhibiting this interest in most things including poor appetite, withdrawal from participating in unit activities like she used to. Have been started on Sertraline 25mg about 2 weeks ago but no significant change.  Patient has no sleep pattern disturbance.  No behavioral disturbance observed.  No other acute medical concerns for this visit.    Nursing and prior provider notes reviewed on this visit. Discussed visit with PCP and nursing staff/ supervisor.    Review of Systems:  Per history of present illness, all other systems reviewed and negative.    HISTORY:  Medical Hx: Reviewed, unchanged  Family Hx: Reviewed, unchanged  Soc Hx: Reviewed,  unchanged    ALLERGY: Reviewed, unchanged.   Allergies   Allergen Reactions    Other      All dairy products     Tilactase         PHYSICAL EXAM:  Vital Signs: T97.4F -P58 -R16 BP: 142/70 Spo2: 96% RA  Weight: 143.0 lbs (5/29/2024) <= 152.6 lbs (4/5/2024) <= 155.8 lbs (3/2/2024) <= 154.5 lbs (1/4/2024)    General: NAD. Well appearing. No acute distress  Head: Atraumatic. Normocephalic.  Ear: [place]  Eye Exam: anicteric sclera, no discharge, PERRLA, No injection  Oral Exam: moist mucous membranes, no buccaloropharyngeal erythema, palatine tonsils WNL.  Neck Exam: no anterior cervical lymphadenopathy noted, neck supple. Trachea midline, no carotid bruit, no masses  Cardiovascular: regular rate, regular rhythm, no: murmurs/ rubs/ gallops. S1 and S2 appreciated.  Pulmonary: no wheeze, no rhonchi, no rales. No chest tenderness. Normal chest wall expansion  Abdominal: soft, non-tender, nondistended, bowel sounds audible x 4 quadrants. No palpable hepatosplenomegaly, no tympany  : Non distended bladder. Continent.  Extremities " "and skin: no edema noted, no rashes. Intact skin  Neurological: alert, cooperative and responsive, Oriented x 2. No tics, normal sensation to pressure and light touch.  moving all 4 extremities symmetrically    Laboratory / Imaging results reviewed. Last labs done on 5/21/2024    Current Medications: All medications reviewed and updated in Nursing Home eMAR.    Please note: This note was completed in part utilizing a voice-recognition software may have been used in the preparation of this document. Grammatical errors, random word insertion, spelling mistakes, and incomplete sentences may be an occasional consequence of the system secondary to software limitations, ambient noise and hardware issues. Occasional wrong word or \"sound-alike\" substitutions may have occurred due to the inherent limitations of voice recognition software. At the time of dictation, efforts were made to edit, clarify and/or correct errors. Interpretation should be guided by context. Please read the chart carefully and recognize, using context, where substitutions have occurred. If you have any questions or concerns about the context, text or information contained within the body of this dictation, please contact myself, the provider, for further clarification.      AWA Up  6/2/2024  "

## 2024-06-02 NOTE — ASSESSMENT & PLAN NOTE
TSH trends up/down  Last TSH (5/21/2024): 10.87 (H) <= 13.28 (2/21/2024)  Free T4 (5/21/2024): 0.67 (WNL) <= 0.89 (WNL: 10/13/2023)  Continue recently adjusted Levothyroxine dose: 125mcg (adjusted on 4/10/2024) daily  TSH with Free T4 on 8/13/2024

## 2024-06-04 ENCOUNTER — NURSING HOME VISIT (OUTPATIENT)
Dept: GERIATRICS | Facility: OTHER | Age: 89
End: 2024-06-04
Payer: MEDICARE

## 2024-06-04 DIAGNOSIS — E11.9 TYPE 2 DIABETES MELLITUS WITHOUT COMPLICATION, WITH LONG-TERM CURRENT USE OF INSULIN (HCC): ICD-10-CM

## 2024-06-04 DIAGNOSIS — Z79.4 TYPE 2 DIABETES MELLITUS WITHOUT COMPLICATION, WITH LONG-TERM CURRENT USE OF INSULIN (HCC): ICD-10-CM

## 2024-06-04 DIAGNOSIS — F02.80 LATE ONSET ALZHEIMER'S DISEASE WITHOUT BEHAVIORAL DISTURBANCE (HCC): Primary | ICD-10-CM

## 2024-06-04 DIAGNOSIS — K59.00 CONSTIPATION, UNSPECIFIED CONSTIPATION TYPE: ICD-10-CM

## 2024-06-04 DIAGNOSIS — K21.9 GASTROESOPHAGEAL REFLUX DISEASE WITHOUT ESOPHAGITIS: ICD-10-CM

## 2024-06-04 DIAGNOSIS — G30.1 LATE ONSET ALZHEIMER'S DISEASE WITHOUT BEHAVIORAL DISTURBANCE (HCC): Primary | ICD-10-CM

## 2024-06-04 DIAGNOSIS — E73.9 LACTOSE INTOLERANCE: ICD-10-CM

## 2024-06-04 PROCEDURE — 99309 SBSQ NF CARE MODERATE MDM 30: CPT | Performed by: INTERNAL MEDICINE

## 2024-06-04 NOTE — ASSESSMENT & PLAN NOTE
Lab Results   Component Value Date    HGBA1C 8.2 (H) 02/21/2024     Continue metformin and lantus   Continue monitoring blood sugars

## 2024-06-04 NOTE — ASSESSMENT & PLAN NOTE
Continues to slowly decline  Continue folic acid, vitamin b12 and zoloft   Continues to require help with ADLs  Continue with safety measures  Continue with fall precautions  Continue to encourage fluids and oral intake

## 2024-06-04 NOTE — PROGRESS NOTES
[facility] Nursing home notes  LONG TERM CARE       NAME: Farida Whitaker  AGE: 89 y.o. SEX: female    DATE OF ENCOUNTER: 2024    Assessment and Plan   Late onset Alzheimer's disease without behavioral disturbance (HCC)  Continues to slowly decline  Continue folic acid, vitamin b12 and zoloft   Continues to require help with ADLs  Continue with safety measures  Continue with fall precautions  Continue to encourage fluids and oral intake       Type 2 diabetes mellitus without complication, with long-term current use of insulin (HCC)    Lab Results   Component Value Date    HGBA1C 8.2 (H) 2024     Continue metformin and lantus   Continue monitoring blood sugars      Gastroesophageal reflux disease without esophagitis  Seems to stable off of meds  Continue monitoring symptoms     Lactose intolerance  Continue lactaid tablets  Continue monitoring symptoms     Constipation  No report of any issues  Continue monitoring symptoms   Continue miralax   Continue with bowel protocol         Chief Complaint     No new complaints     History of Present Illness     88 yo female seen for monthly visit and med renewal. Patient seen for dementia, DMII, GERD, lactose intolerance and constipation. Reviewed nursing notes since last visit.     PMHx     Past Medical History:   Diagnosis Date    Alzheimer's dementia (HCC)     COVID-19 2020    Depression     Ground glass opacity present on imaging of lung 2020    HLD (hyperlipidemia)     Hypothyroid     Low O2 saturation 2020    Neurocognitive disorder     T2DM (type 2 diabetes mellitus) (HCC)      Past Surgical History:   Procedure Laterality Date     SECTION      HYSTERECTOMY       Family History   Problem Relation Age of Onset    Stomach cancer Mother     No Known Problems Father      Social History     Socioeconomic History    Marital status:      Spouse name: Not on file    Number of children: Not on file    Years of education: Not  on file    Highest education level: Not on file   Occupational History    Not on file   Tobacco Use    Smoking status: Never    Smokeless tobacco: Never   Vaping Use    Vaping status: Never Used   Substance and Sexual Activity    Alcohol use: Not Currently    Drug use: Not Currently    Sexual activity: Not Currently   Other Topics Concern    Not on file   Social History Narrative    Not on file     Social Determinants of Health     Financial Resource Strain: Not on file   Food Insecurity: Not on file   Transportation Needs: Not on file   Physical Activity: Not on file   Stress: Not on file   Social Connections: Not on file   Intimate Partner Violence: Not on file   Housing Stability: Not on file     Allergies   Allergen Reactions    Other      All dairy products     Tilactase        Review of Systems     Denies any pain or shortness of breath  All other review of system negative but hx limited due to dementia       Objective   Vital signs reviewed from facility records.     PHYSICAL EXAM:  GENERAL: no acute distress  SKIN: warm, dry, no rash, no cyanosis  HEENT: normocephalic, atraumatic, no JVD, no Thyromegaly, no lymphadenopathy  LUNGS: CTA, no wheezing, no rales, expanded equally, no chest tenderness   HEART: normal rhythm, normal rate, no murmur, no gallop  ABDOMEN: soft non tender non distended bs+, no guarding or rebound tenderness  :  no suprapubic tenderness  MUSCULOSKELETAL: strength about 4+/5 all extremities, ROM within normal, no calf tenderness  NEUROLOGY: awake, alert, CN2-12 intact.   PSYCH: cooperative, pleasant.       Pertinent Laboratory/Diagnostic Studies:  Recent labs and diagnostic tests reviewed in nursing home EMR    Current Medications   Medications reviewed from nursing home EMR.    Prescriptions drug management - Patient prescriptions sent to pharmacy via renewal in [facility]         ESA SANCHEZ MD

## 2024-06-04 NOTE — ASSESSMENT & PLAN NOTE
No report of any issues  Continue monitoring symptoms   Continue miralax   Continue with bowel protocol

## 2024-07-08 ENCOUNTER — NURSING HOME VISIT (OUTPATIENT)
Dept: GERIATRICS | Facility: OTHER | Age: 89
End: 2024-07-08
Payer: MEDICARE

## 2024-07-08 VITALS
RESPIRATION RATE: 18 BRPM | WEIGHT: 135 LBS | DIASTOLIC BLOOD PRESSURE: 75 MMHG | OXYGEN SATURATION: 100 % | BODY MASS INDEX: 25.51 KG/M2 | SYSTOLIC BLOOD PRESSURE: 125 MMHG | TEMPERATURE: 98.2 F | HEART RATE: 70 BPM

## 2024-07-08 DIAGNOSIS — K59.00 CONSTIPATION, UNSPECIFIED CONSTIPATION TYPE: ICD-10-CM

## 2024-07-08 DIAGNOSIS — E11.9 TYPE 2 DIABETES MELLITUS WITHOUT COMPLICATION, WITH LONG-TERM CURRENT USE OF INSULIN (HCC): ICD-10-CM

## 2024-07-08 DIAGNOSIS — F02.80 LATE ONSET ALZHEIMER'S DISEASE WITHOUT BEHAVIORAL DISTURBANCE (HCC): Primary | ICD-10-CM

## 2024-07-08 DIAGNOSIS — E06.3 HYPOTHYROIDISM DUE TO HASHIMOTO'S THYROIDITIS: ICD-10-CM

## 2024-07-08 DIAGNOSIS — E03.8 HYPOTHYROIDISM DUE TO HASHIMOTO'S THYROIDITIS: ICD-10-CM

## 2024-07-08 DIAGNOSIS — I10 ESSENTIAL HYPERTENSION: ICD-10-CM

## 2024-07-08 DIAGNOSIS — E73.9 LACTOSE INTOLERANCE: ICD-10-CM

## 2024-07-08 DIAGNOSIS — Z79.4 TYPE 2 DIABETES MELLITUS WITHOUT COMPLICATION, WITH LONG-TERM CURRENT USE OF INSULIN (HCC): ICD-10-CM

## 2024-07-08 DIAGNOSIS — G30.1 LATE ONSET ALZHEIMER'S DISEASE WITHOUT BEHAVIORAL DISTURBANCE (HCC): Primary | ICD-10-CM

## 2024-07-08 PROCEDURE — 99309 SBSQ NF CARE MODERATE MDM 30: CPT

## 2024-07-08 NOTE — ASSESSMENT & PLAN NOTE
Well controlled at this time  Within recommended guidelines for geriatric population: 125/75  Continue Lisinopril 2.5 mg once daily  Continue to monitor BP at LTCF  Denies any CP, dizziness/lightheadedness, headaches at this time

## 2024-07-08 NOTE — PROGRESS NOTES
[facility]  [address] [address] [phone]  FACILITY: [facility] 32 (LT)        NAME: Farida Whitaker  AGE: 89 y.o. SEX: female CODE STATUS: No CPR    DATE OF ENCOUNTER: 7/8/2024    Assessment and Plan     1. Late onset Alzheimer's disease without behavioral disturbance (HCC)  Assessment & Plan:  AAOx1, to self only  No behaviors noted per nursing chart review  Continue Zoloft 50 mg once daily  Provide redirection, reorientation, distraction techniques  Fall Precautions  Assist with ADLs/IADLs  Avoid deliriogenic medications such as tramadol, benzodiazepines, anticholinergics, benadryl  Encourage Hydration/ Nutrition  Continue Healthshake sugar free TID  Implement sleep hygiene, limit night time interuptions   Encourage participation in group activities when appropriate   2. Type 2 diabetes mellitus without complication, with long-term current use of insulin (HCC)  Assessment & Plan:  Last A1c 5/21/2024: 6.5  Below recommended ADA guidelines for geriatric population of >7.5  Continue regular diet  Accuchecks every AM completed LTCF  Range between:  per nursing chart review  Continue to monitor  Continue Lantus 11 units at bedtime  Continue Metformin 750 mg once daily  Consider decreasing dose and rechecking A1c  3. Hypothyroidism due to Hashimoto's thyroiditis  Assessment & Plan:  Last TSH 5/21: 10.87; previous: 13.28 (2/21)  T4, free 5/21: 0.67; previous: 0.89 (10/2023)  Continue Levothyroxine 125 mcg once daily  TSH/Free T4 previously ordered for 8/13/2024  4. Essential hypertension  Assessment & Plan:  Well controlled at this time  Within recommended guidelines for geriatric population: 125/75  Continue Lisinopril 2.5 mg once daily  Continue to monitor BP at LTCF  Denies any CP, dizziness/lightheadedness, headaches at this time  5. Constipation, unspecified constipation type  Assessment & Plan:  Daily bowel movement noted since 7/3/24  Last BM 7/7  Continue Senokot 8.6 mg  once daily  Continue Miralax 17 g packet once daily  Continue to encourage adequate hydration/nutrition  Continue to monitor to prevent delirium  6. Lactose intolerance  Assessment & Plan:  Continue Dairy relief 9000 units TID with meals       All medications and routine orders were reviewed and updated as needed.    Chief Complaint       LTC follow up visit       Past Medical and Surgica History      Past Medical History:   Diagnosis Date   • Alzheimer's dementia (HCC)    • COVID-19 2020   • Depression    • Ground glass opacity present on imaging of lung 2020   • HLD (hyperlipidemia)    • Hypothyroid    • Low O2 saturation 2020   • Neurocognitive disorder    • T2DM (type 2 diabetes mellitus) (HCC)      Past Surgical History:   Procedure Laterality Date   •  SECTION     • HYSTERECTOMY       Allergies   Allergen Reactions   • Other      All dairy products    • Tilactase           History of Present Illness     Farida Whitaker is a 89 y.o. female , she is a LTC resident of [facility] since 2020. Past Medical Hx including but not limited to alzheimer's dementia, T2DM, hypertension, hypothyroidism caused by hashimoto's, and constipation and is seen in collaboration with nursing for medical mgmt and LTC follow up.     Seen and examined at bedside today. Patient is a poor historian due to Alzheimer's/Dementia. History is obtained from review of records and staff. She is calm and pleasant today upon exam. She is laying in bed and is able to sit up for further physical exam.      Resident denies CP/SOB/N/V/D. Denies lightheadedness, dizziness, headaches, vision changes. Staff state resident is eating well and staying hydrated. There are no bowel or bladder issues/concerns. No other concerns from staff at this time.         The patient's allergies, past medical, surgical, social and family history were reviewed and unchanged.    Review of Systems     Review of  Systems   Unable to perform ROS: Dementia       Objective     Vitals:   Vitals:    07/08/24 1555   BP: 125/75   Pulse: 70   Resp: 18   Temp: 98.2 °F (36.8 °C)   SpO2: 100%         Physical Exam  Vitals and nursing note reviewed.   Constitutional:       Appearance: Normal appearance.   HENT:      Head: Normocephalic.      Right Ear: External ear normal.      Left Ear: External ear normal.      Nose: Nose normal.      Mouth/Throat:      Mouth: Mucous membranes are dry.      Pharynx: Oropharynx is clear.   Eyes:      Pupils: Pupils are equal, round, and reactive to light.   Cardiovascular:      Rate and Rhythm: Normal rate and regular rhythm.      Pulses: Normal pulses.      Heart sounds: Normal heart sounds.   Pulmonary:      Effort: Pulmonary effort is normal.      Breath sounds: Normal breath sounds.   Abdominal:      General: Abdomen is flat. Bowel sounds are normal.      Palpations: Abdomen is soft.   Musculoskeletal:         General: Normal range of motion.      Cervical back: Normal range of motion.   Skin:     General: Skin is warm and dry.      Capillary Refill: Capillary refill takes less than 2 seconds.   Neurological:      Mental Status: She is alert. Mental status is at baseline.   Psychiatric:         Mood and Affect: Mood normal.         Behavior: Behavior normal.      Comments: AAOx1, to self only; hx of alzheimer's dementia     Pertinent Laboratory/Diagnostic Studies:     Reviewed in facility chart      Current Medications   Medications reviewed and updated see facility MAR for details.      Current Outpatient Medications:   •  acetaminophen (TYLENOL) 325 mg tablet, Take 650 mg by mouth every 6 (six) hours as needed for mild pain, Disp: , Rfl:   •  cholecalciferol (VITAMIN D3) 1,000 units tablet, Take 1,000 Units by mouth daily, Disp: , Rfl:   •  folic acid (FOLVITE) 1 mg tablet, Take 1 mg by mouth daily, Disp: , Rfl:   •  insulin glargine (LANTUS) 100 units/mL subcutaneous injection, Inject 11 Units  "under the skin daily at bedtime, Disp: , Rfl:   •  lactase (LACTAID) 3,000 units tablet, Take 9,000 Units by mouth 3 (three) times a day with meals, Disp: , Rfl:   •  levothyroxine 125 mcg tablet, Take 125 mcg by mouth daily, Disp: , Rfl:   •  lisinopril (ZESTRIL) 2.5 mg tablet, Take 2.5 mg by mouth daily, Disp: , Rfl:   •  metFORMIN (GLUCOPHAGE-XR) 750 mg 24 hr tablet, Take 750 mg by mouth daily with breakfast, Disp: , Rfl:   •  polyethylene glycol (MIRALAX) 17 g packet, Take 17 g by mouth daily, Disp: 14 each, Rfl: 0  •  senna (SENOKOT) 8.6 mg, Take 1 tablet (8.6 mg total) by mouth daily at bedtime, Disp: 120 each, Rfl: 0  •  sertraline (ZOLOFT) 50 mg tablet, Take 50 mg by mouth daily, Disp: , Rfl:   •  vitamin B-12 (VITAMIN B-12) 1,000 mcg tablet, Take by mouth daily, Disp: , Rfl:      Please note:  Voice-recognition software may have been used in the preparation of this document.  Occasional wrong word or \"sound-alike\" substitutions may have occurred due to the inherent limitations of voice recognition software.  Interpretation should be guided by context.         AWA Torres  7/8/2024  4:08 PM    "

## 2024-07-08 NOTE — ASSESSMENT & PLAN NOTE
Last A1c 5/21/2024: 6.5  Below recommended ADA guidelines for geriatric population of >7.5  Continue regular diet  Accuchecks every AM completed LTCF  Range between:  per nursing chart review  Continue to monitor  Continue Lantus 11 units at bedtime  Continue Metformin 750 mg once daily  Consider decreasing dose and rechecking A1c

## 2024-07-08 NOTE — ASSESSMENT & PLAN NOTE
Daily bowel movement noted since 7/3/24  Last BM 7/7  Continue Senokot 8.6 mg once daily  Continue Miralax 17 g packet once daily  Continue to encourage adequate hydration/nutrition  Continue to monitor to prevent delirium

## 2024-07-08 NOTE — ASSESSMENT & PLAN NOTE
AAOx1, to self only  No behaviors noted per nursing chart review  Continue Zoloft 50 mg once daily  Provide redirection, reorientation, distraction techniques  Fall Precautions  Assist with ADLs/IADLs  Avoid deliriogenic medications such as tramadol, benzodiazepines, anticholinergics, benadryl  Encourage Hydration/ Nutrition  Continue Healthshake sugar free TID  Implement sleep hygiene, limit night time interuptions   Encourage participation in group activities when appropriate

## 2024-07-08 NOTE — ASSESSMENT & PLAN NOTE
Last TSH 5/21: 10.87; previous: 13.28 (2/21)  T4, free 5/21: 0.67; previous: 0.89 (10/2023)  Continue Levothyroxine 125 mcg once daily  TSH/Free T4 previously ordered for 8/13/2024

## 2024-08-08 ENCOUNTER — NURSING HOME VISIT (OUTPATIENT)
Dept: GERIATRICS | Facility: OTHER | Age: 89
End: 2024-08-08
Payer: MEDICARE

## 2024-08-08 DIAGNOSIS — K59.00 CONSTIPATION, UNSPECIFIED CONSTIPATION TYPE: ICD-10-CM

## 2024-08-08 DIAGNOSIS — R26.2 AMBULATORY DYSFUNCTION: ICD-10-CM

## 2024-08-08 DIAGNOSIS — I10 ESSENTIAL HYPERTENSION: ICD-10-CM

## 2024-08-08 DIAGNOSIS — E03.8 HYPOTHYROIDISM DUE TO HASHIMOTO'S THYROIDITIS: Primary | ICD-10-CM

## 2024-08-08 DIAGNOSIS — E06.3 HYPOTHYROIDISM DUE TO HASHIMOTO'S THYROIDITIS: Primary | ICD-10-CM

## 2024-08-08 PROCEDURE — 99309 SBSQ NF CARE MODERATE MDM 30: CPT | Performed by: INTERNAL MEDICINE

## 2024-08-08 NOTE — ASSESSMENT & PLAN NOTE
BP reviewed from facility records, acceptable range with few SBP in the 90s and some in the 140s  Continue lisinopril 2.5 mg po daily for now   Continue monitoring BP

## 2024-08-08 NOTE — PROGRESS NOTES
[facility] notes  LONG TERM CARE       NAME: Farida Whitaker  AGE: 89 y.o. SEX: female    DATE OF ENCOUNTER: 2024    Assessment and Plan   Hypothyroidism due to Hashimoto's thyroiditis  TSH ordered for next week   Continue levothyroxine 125mcg po daily      Ambulatory dysfunction  Multifactorial   Ambulates around unit   Continue with safety measures and fall precautions     Constipation  No complaints  Continue senna and miralax   Continue with bowel protocol     Essential hypertension  BP reviewed from facility records, acceptable range with few SBP in the 90s and some in the 140s  Continue lisinopril 2.5 mg po daily for now   Continue monitoring BP            Chief Complaint     No new complaints     History of Present Illness     88 yo female seen for monthly visit and med renewal. Patient seen for hypothyroidism, ambulatory dysfunction, constipation and HTN. Reviewed nursing notes since last visit.     PMHx     Past Medical History:   Diagnosis Date    Alzheimer's dementia (HCC)     COVID-19 2020    Depression     Ground glass opacity present on imaging of lung 2020    HLD (hyperlipidemia)     Hypothyroid     Low O2 saturation 2020    Neurocognitive disorder     T2DM (type 2 diabetes mellitus) (HCC)      Past Surgical History:   Procedure Laterality Date     SECTION      HYSTERECTOMY       Family History   Problem Relation Age of Onset    Stomach cancer Mother     No Known Problems Father      Social History     Socioeconomic History    Marital status:      Spouse name: Not on file    Number of children: Not on file    Years of education: Not on file    Highest education level: Not on file   Occupational History    Not on file   Tobacco Use    Smoking status: Never    Smokeless tobacco: Never   Vaping Use    Vaping status: Never Used   Substance and Sexual Activity    Alcohol use: Not Currently    Drug use: Not Currently    Sexual activity: Not  Currently   Other Topics Concern    Not on file   Social History Narrative    Not on file     Social Determinants of Health     Financial Resource Strain: Not on file   Food Insecurity: Not on file   Transportation Needs: Not on file   Physical Activity: Not on file   Stress: Not on file   Social Connections: Not on file   Intimate Partner Violence: Not on file   Housing Stability: Not on file     Allergies   Allergen Reactions    Other      All dairy products     Tilactase        Review of Systems     Denies any pain or shortness of breath   All other review of system negative but hx of limited due to dementia      Objective   Vital signs reviewed from facility records    PHYSICAL EXAM:  GENERAL: no acute distress  SKIN: warm, dry, no rash, no cyanosis  HEENT: normocephalic, atraumatic, no JVD, no Thyromegaly, no lymphadenopathy  LUNGS: CTA, no wheezing, no rales, expanded equally, no chest tenderness   HEART: normal rhythm, normal rate, no murmur, no gallop  ABDOMEN: soft non tender non distended bs+, no guarding or rebound tenderness  :  no suprapubic tenderness  MUSCULOSKELETAL: strength about 4+/5 all extremities, ROM within normal, no calf tenderness  NEUROLOGY: awake, alert, CN2-12 intact.   PSYCH: cooperative, pleasant.       Pertinent Laboratory/Diagnostic Studies:  Recent labs and diagnostic tests reviewed in nursing home EMR    Current Medications   Medications reviewed from nursing home EMR.  Prescriptions drug management - Patient prescriptions sent to pharmacy via renewal in [facility]         ESA SANCHEZ MD

## 2024-09-11 ENCOUNTER — NURSING HOME VISIT (OUTPATIENT)
Dept: GERIATRICS | Facility: OTHER | Age: 89
End: 2024-09-11
Payer: MEDICARE

## 2024-09-11 DIAGNOSIS — E11.9 TYPE 2 DIABETES MELLITUS WITHOUT COMPLICATION, WITH LONG-TERM CURRENT USE OF INSULIN (HCC): ICD-10-CM

## 2024-09-11 DIAGNOSIS — F02.80 LATE ONSET ALZHEIMER'S DISEASE WITHOUT BEHAVIORAL DISTURBANCE (HCC): Primary | ICD-10-CM

## 2024-09-11 DIAGNOSIS — Z79.4 TYPE 2 DIABETES MELLITUS WITHOUT COMPLICATION, WITH LONG-TERM CURRENT USE OF INSULIN (HCC): ICD-10-CM

## 2024-09-11 DIAGNOSIS — E06.3 HYPOTHYROIDISM DUE TO HASHIMOTO'S THYROIDITIS: ICD-10-CM

## 2024-09-11 DIAGNOSIS — G30.1 LATE ONSET ALZHEIMER'S DISEASE WITHOUT BEHAVIORAL DISTURBANCE (HCC): Primary | ICD-10-CM

## 2024-09-11 DIAGNOSIS — I10 ESSENTIAL HYPERTENSION: ICD-10-CM

## 2024-09-11 PROCEDURE — 99309 SBSQ NF CARE MODERATE MDM 30: CPT | Performed by: NURSE PRACTITIONER

## 2024-10-01 ENCOUNTER — NURSING HOME VISIT (OUTPATIENT)
Dept: GERIATRICS | Facility: OTHER | Age: 89
End: 2024-10-01
Payer: MEDICARE

## 2024-10-01 DIAGNOSIS — E78.2 MIXED HYPERLIPIDEMIA: ICD-10-CM

## 2024-10-01 DIAGNOSIS — E11.9 TYPE 2 DIABETES MELLITUS WITHOUT COMPLICATION, WITH LONG-TERM CURRENT USE OF INSULIN (HCC): ICD-10-CM

## 2024-10-01 DIAGNOSIS — Z79.4 TYPE 2 DIABETES MELLITUS WITHOUT COMPLICATION, WITH LONG-TERM CURRENT USE OF INSULIN (HCC): ICD-10-CM

## 2024-10-01 DIAGNOSIS — E06.3 HYPOTHYROIDISM DUE TO HASHIMOTO'S THYROIDITIS: ICD-10-CM

## 2024-10-01 DIAGNOSIS — F02.80 LATE ONSET ALZHEIMER'S DISEASE WITHOUT BEHAVIORAL DISTURBANCE (HCC): Primary | ICD-10-CM

## 2024-10-01 DIAGNOSIS — E73.9 LACTOSE INTOLERANCE: ICD-10-CM

## 2024-10-01 DIAGNOSIS — G30.1 LATE ONSET ALZHEIMER'S DISEASE WITHOUT BEHAVIORAL DISTURBANCE (HCC): Primary | ICD-10-CM

## 2024-10-01 PROCEDURE — 99309 SBSQ NF CARE MODERATE MDM 30: CPT | Performed by: INTERNAL MEDICINE

## 2024-10-01 NOTE — ASSESSMENT & PLAN NOTE
Last hba1c 6.5 on May 21, 2024 - acceptable range  Continue with lantuus and metformin  Continue monitoring blood sugars  Continue with diabetic diet

## 2024-10-01 NOTE — ASSESSMENT & PLAN NOTE
Continues to slowly decline  Continue zoloft, vitamin B12 and folic acid   Continues to require help with ADLs  Continue with safety measures  Continue with fall precautions  Continue to encourage fluids and oral intake

## 2024-10-01 NOTE — ASSESSMENT & PLAN NOTE
TSH elevated in Aug but free T4 normal   Continue with current dose of levothyroxine   Will recheck TSH in 6 weeks

## 2024-10-01 NOTE — PROGRESS NOTES
[facility] Nursing [facility] notes  LONG TERM CARE       NAME: Farida Whitaker  AGE: 89 y.o. SEX: female    DATE OF ENCOUNTER: 10/1/2024    Assessment and Plan   Late onset Alzheimer's disease without behavioral disturbance (HCC)  Continues to slowly decline  Continue zoloft, vitamin B12 and folic acid   Continues to require help with ADLs  Continue with safety measures  Continue with fall precautions  Continue to encourage fluids and oral intake       Type 2 diabetes mellitus without complication, with long-term current use of insulin (HCC)  Last hba1c 6.5 on May 21, 2024 - acceptable range  Continue with lantuus and metformin  Continue monitoring blood sugars  Continue with diabetic diet     Lactose intolerance  No complaints at present time  Continue with lactaid tablets     Mixed hyperlipidemia  Lipid panel done in Aug - good range  Continue monitoring lipid panel  Not on stains     Hypothyroidism due to Hashimoto's thyroiditis  TSH elevated in Aug but free T4 normal   Continue with current dose of levothyroxine   Will recheck TSH in 6 weeks        Chief Complaint     No new complaints     History of Present Illness     89 yo female seen for monthly visit and med renewal. Patient seen for dementia, DMII, lactose intolerance, HLD and hypothyroidism. Reviewed nursing notes since last visit.     PMHx     Past Medical History:   Diagnosis Date    Alzheimer's dementia (HCC)     COVID-19 2020    Depression     Ground glass opacity present on imaging of lung 2020    HLD (hyperlipidemia)     Hypothyroid     Low O2 saturation 2020    Neurocognitive disorder     T2DM (type 2 diabetes mellitus) (HCC)      Past Surgical History:   Procedure Laterality Date     SECTION      HYSTERECTOMY       Family History   Problem Relation Age of Onset    Stomach cancer Mother     No Known Problems Father      Social History     Socioeconomic History    Marital status:      Spouse name: Not on file     Number of children: Not on file    Years of education: Not on file    Highest education level: Not on file   Occupational History    Not on file   Tobacco Use    Smoking status: Never    Smokeless tobacco: Never   Vaping Use    Vaping status: Never Used   Substance and Sexual Activity    Alcohol use: Not Currently    Drug use: Not Currently    Sexual activity: Not Currently   Other Topics Concern    Not on file   Social History Narrative    Not on file     Social Determinants of Health     Financial Resource Strain: Not on file   Food Insecurity: Not on file   Transportation Needs: Not on file   Physical Activity: Not on file   Stress: Not on file   Social Connections: Not on file   Intimate Partner Violence: Not on file   Housing Stability: Not on file     Allergies   Allergen Reactions    Other      All dairy products     Tilactase        Review of Systems     Denies any pain or shortness of breath   All other review of system negative but hx limited due to dementia       Objective   Vital signs reviewed from facility records.     PHYSICAL EXAM:  GENERAL: no acute distress  SKIN: warm, dry, no rash, no cyanosis  HEENT: normocephalic, atraumatic, no JVD, no Thyromegaly, no lymphadenopathy  LUNGS: CTA, no wheezing, no rales, expanded equally, no chest tenderness   HEART: normal rhythm, normal rate, no murmur, no gallop  ABDOMEN: soft non tender non distended bs+, no guarding or rebound tenderness  :  no suprapubic tenderness  MUSCULOSKELETAL: strength about 4+/5 all extremities, ROM within normal, no calf tenderness  NEUROLOGY: awake, alert, CN2-12 intact.   PSYCH: cooperative, pleasant.       Pertinent Laboratory/Diagnostic Studies:  Recent labs and diagnostic tests reviewed in nursing home EMR    Current Medications   Medications reviewed from nursing home EMR.  Prescriptions drug management - Patient prescriptions sent to pharmacy via renewal in [facility]         ESA SANCHEZ MD

## 2024-10-20 NOTE — PROGRESS NOTES
[facility]  [address] [place] [address], [address], [place] [address]  [phone]    NAME: Farida Whitaker  AGE: 89 y.o. SEX: female    Progress Note    Location: [facility]  POS: 32 (LTC)    Assessment/Plan:    Late onset Alzheimer's disease without behavioral disturbance (HCC)  Continues with small increments of weight loss but slower since last visit  No anemia (8/29/2024)  Renal function WNL (8/29/2024)  Lipid panel (8/21/2024): WNL.  TSH (8/13/2024): 4.34 (WNL) <= 10.87 (H: 5/21/2024) <= 13.28 (2/21/2024)  Free T4 Free T4 (8/13/2024): 1.14  HbA1C (5/21/2024): 6.5 (H) <= (8.2 (H: 2/21/2024)  Continue Sertraline to 50mg daily  Continue Nutritional shake BID  Continue LTCF supportive care  Followed by ADM/ Palliative Care CRNP  Code status/ Goal: DNR/DNI. Limited Intervention. May send to Hospital     Type 2 diabetes mellitus without complication, with long-term current use of insulin ([facility])  HbA1C (5/21/2024): 6.5 (H) <= 8.2 (2/21/2024) <= 8.1 (H: 8/21/2023)  Goal: < 8%  Well controlled with current meds  FBG range (9/1-11/2024) = 77 to 119  Continue Metformin 750mg ER daily and Lantus 11 units at   Will consider D/C Lantus on next visit     Essential hypertension  Goal: < 140-150/90  BP range (9/1-11/2024) = 117/68 to 136/70   Continue Lisinopril 2.5mg daily     Hypothyroidism due to Hashimoto's thyroiditis  TSH (8/13/2024): 4.34 (WNL) <= 10.87 (H: 5/21/2024) <= 13.28 (2/21/2024)  Free T4 (8/13/2024): 1.14 <= 0.67 (WNL: 5/21/2024) <= 0.89 (WNL: 10/13/2023)  Continue Levothyroxine 125mcg daily  TSH with Free T4 on 11/12/2024    Chief complaint / Reason for visit:  Follow- visit    History of Present Illness:  This is an 89-year-old female patient residing at [facility]. Patient is seen and examined today to follow-up for any acute and chronic medical conditions.  Patient is OOB for this visit - actively participatin in unit activities with other residents. Patient is alert, cooperative, calm, pleasant and not  "in distress. Patient reported feeling well on this visit, \" I'm oaky\" - denies any acute medical concerns during ROS assessment including pain. Per nursing, is doing much better - now participating in therapy and getting OOB in the morning\" - no behavioral disturbance - sleeps through the night - no acute medical concerns for this visit.    Nursing and prior provider notes reviewed on this visit. Discussed visit with PCP and nursing staff/ supervisor.    Review of Systems:  Per history of present illness, all other systems reviewed and negative.    HISTORY:  Medical Hx: Reviewed, unchanged  Family Hx: Reviewed, unchanged  Soc Hx: Reviewed,  unchanged    ALLERGY: Reviewed, unchanged.   Allergies   Allergen Reactions    Other      All dairy products     Tilactase         PHYSICAL EXAM:  Vital Signs: T98.2F -P76 -R18 BP: 117/68 SpO2: 100% RA  Weight: 133.0 lbs (9/11/2024) <= 133.2 lbs (8/13/2024) <= 135.0 lbs (7/10/2024)     General: NAD. Well appearing. No acute distress  Head: Atraumatic. Normocephalic.  Ear: [place]  Eye Exam: anicteric sclera, no discharge, PERRLA, No injection  Oral Exam: moist mucous membranes, no buccaloropharyngeal erythema, palatine tonsils WNL.  Neck Exam: no anterior cervical lymphadenopathy noted, neck supple. Trachea midline, no carotid bruit, no masses  Cardiovascular: regular rate, regular rhythm, no: murmurs/ rubs/ gallops. S1 and S2 appreciated.  Pulmonary: no wheeze, no rhonchi, no rales. No chest tenderness. Normal chest wall expansion  Abdominal: soft, non-tender, nondistended, bowel sounds audible x 4 quadrants. No palpable hepatosplenomegaly, no tympany  : Non distended bladder. Continent.  Extremities and skin: no edema noted, no rashes. Intact skin  Neurological: alert, cooperative and responsive, Oriented x 2. No tics, normal sensation to pressure and light touch.  moving all 4 extremities symmetrically.    Laboratory / Imaging results reviewed. Last labs done on " "8/29/2024    Current Medications: All medications reviewed and updated in Nursing Home eMAR.    Please note: This note was completed in part utilizing a voice-recognition software may have been used in the preparation of this document. Grammatical errors, random word insertion, spelling mistakes, and incomplete sentences may be an occasional consequence of the system secondary to software limitations, ambient noise and hardware issues. Occasional wrong word or \"sound-alike\" substitutions may have occurred due to the inherent limitations of voice recognition software. At the time of dictation, efforts were made to edit, clarify and/or correct errors. Interpretation should be guided by context. Please read the chart carefully and recognize, using context, where substitutions have occurred. If you have any questions or concerns about the context, text or information contained within the body of this dictation, please contact myself, the provider, for further clarification.      AWA Up  10/20/2024  "

## 2024-11-06 ENCOUNTER — NURSING HOME VISIT (OUTPATIENT)
Dept: GERIATRICS | Facility: OTHER | Age: 89
End: 2024-11-06
Payer: MEDICARE

## 2024-11-06 DIAGNOSIS — G30.1 LATE ONSET ALZHEIMER'S DISEASE WITHOUT BEHAVIORAL DISTURBANCE (HCC): Primary | ICD-10-CM

## 2024-11-06 DIAGNOSIS — E11.9 TYPE 2 DIABETES MELLITUS WITHOUT COMPLICATION, WITH LONG-TERM CURRENT USE OF INSULIN (HCC): ICD-10-CM

## 2024-11-06 DIAGNOSIS — E06.3 HYPOTHYROIDISM DUE TO HASHIMOTO'S THYROIDITIS: ICD-10-CM

## 2024-11-06 DIAGNOSIS — I10 ESSENTIAL HYPERTENSION: ICD-10-CM

## 2024-11-06 DIAGNOSIS — Z79.4 TYPE 2 DIABETES MELLITUS WITHOUT COMPLICATION, WITH LONG-TERM CURRENT USE OF INSULIN (HCC): ICD-10-CM

## 2024-11-06 DIAGNOSIS — F02.80 LATE ONSET ALZHEIMER'S DISEASE WITHOUT BEHAVIORAL DISTURBANCE (HCC): Primary | ICD-10-CM

## 2024-11-06 PROCEDURE — 99309 SBSQ NF CARE MODERATE MDM 30: CPT | Performed by: NURSE PRACTITIONER

## 2024-11-28 NOTE — PROGRESS NOTES
"[facility]  [address] [place] [address] Rd, [address], [place] [address]  [phone]    NAME: Farida Whitaker  AGE: 89 y.o. SEX: female    Progress Note    Location: [facility]  POS: 32 ([facility])    Assessment/Plan:    Late onset Alzheimer's disease without behavioral disturbance (HCC)  Stable.  Stable weight in the last 3 months  Continue Nutritional shake BID  Continue LTCF supportive care  Followed by ADM/ Palliative Care CRNP  Code status/ Goal: DNR/DNI. Limited Intervention. May send to Hospital     Type 2 diabetes mellitus without complication, with long-term current use of insulin (HCC)  HbA1C (5/21/2024): 6.5 (H) <= 8.2 (2/21/2024) <= 8.1 (H: 8/21/2023)  Goal: < 8%  Well controlled with current meds  FBG range (Oct/ 2024) = 73 to 117  Continue Metformin 750mg ER daily and Lantus 11 units at HS  HbA1C on 11/12/2024     Essential hypertension  Goal: < 140/90  BP range (Oct/2024) = 118/64 to 155/80  ** 2  and higher on this period   Continue Lisinopril 2.5mg daily     Hypothyroidism due to Hashimoto's thyroiditis  TSH (8/13/2024): 4.34 (WNL) <= 10.87 (H: 5/21/2024)   Free T4 (8/13/2024): 1.14 <= 0.67 (WNL: 5/21/2024)  Continue Levothyroxine 125mcg daily  TSH with reflex Free T4 on 11/12/2024      Chief complaint / Reason for visit:  Follow- visit    History of Present Illness:  This is an 89-year-old female patient residing at [facility]. Patient is seen and examined today to follow-up for any acute and chronic medical conditions.  Patient is OOB for this visit - actively participating in unit activities with other residents. Patient is alert, cooperative, calm, pleasant and not in distress. Patient reported feeling well on this visit, \" I'm alright\" - denies any acute medical concerns during ROS assessment including pain. Per nursing, no acute medical concerns for this visit.    Nursing and prior provider notes reviewed on this visit. Discussed visit with PCP and nursing staff/ supervisor.    Review of " "Systems:  Per history of present illness, all other systems reviewed and negative.    HISTORY:  Medical Hx: Reviewed, unchanged  Family Hx: Reviewed, unchanged  Soc Hx: Reviewed,  unchanged    ALLERGY: Reviewed, unchanged.   Allergies   Allergen Reactions    Other      All dairy products     Tilactase         PHYSICAL EXAM:  Vital Signs: T98.3F -P68 -R18 BP: 128/70 (10/17/2024) SpO2: 98% RA  Weight: 132.0 lbs (11/4/2024) <= 133.0 lbs (10/4/2024) <= 133.0 lbs (9/11/2024)    General: NAD. Well appearing. No acute distress  Head: Atraumatic. Normocephalic.  Ear: [place]  Eye Exam: anicteric sclera, no discharge, PERRLA, No injection  Oral Exam: moist mucous membranes, no buccaloropharyngeal erythema, palatine tonsils WNL.  Neck Exam: no anterior cervical lymphadenopathy noted, neck supple. Trachea midline, no carotid bruit, no masses  Cardiovascular: regular rate, regular rhythm, no: murmurs/ rubs/ gallops. S1 and S2 appreciated.  Pulmonary: no wheeze, no rhonchi, no rales. No chest tenderness. Normal chest wall expansion  Abdominal: soft, non-tender, nondistended, bowel sounds audible x 4 quadrants. No palpable hepatosplenomegaly, no tympany  : Non distended bladder.  Extremities and skin: no edema noted, no rashes. Intact skin  Neurological: alert, cooperative and responsive, Oriented x 2. No tics, normal sensation to pressure and light touch.  moving all 4 extremities symmetrically.    Laboratory / Imaging results reviewed.    Current Medications: All medications reviewed and updated in Nursing Home eMAR.    Please note: This note was completed in part utilizing a voice-recognition software may have been used in the preparation of this document. Grammatical errors, random word insertion, spelling mistakes, and incomplete sentences may be an occasional consequence of the system secondary to software limitations, ambient noise and hardware issues. Occasional wrong word or \"sound-alike\" substitutions may have occurred " due to the inherent limitations of voice recognition software. At the time of dictation, efforts were made to edit, clarify and/or correct errors. Interpretation should be guided by context. Please read the chart carefully and recognize, using context, where substitutions have occurred. If you have any questions or concerns about the context, text or information contained within the body of this dictation, please contact myself, the provider, for further clarification.      AWA Up  11/28/2024   No

## 2024-12-03 ENCOUNTER — NURSING HOME VISIT (OUTPATIENT)
Dept: GERIATRICS | Facility: OTHER | Age: 89
End: 2024-12-03
Payer: MEDICARE

## 2024-12-03 DIAGNOSIS — K21.9 GASTROESOPHAGEAL REFLUX DISEASE WITHOUT ESOPHAGITIS: ICD-10-CM

## 2024-12-03 DIAGNOSIS — I10 ESSENTIAL HYPERTENSION: Primary | ICD-10-CM

## 2024-12-03 DIAGNOSIS — G30.1 LATE ONSET ALZHEIMER'S DISEASE WITHOUT BEHAVIORAL DISTURBANCE (HCC): ICD-10-CM

## 2024-12-03 DIAGNOSIS — R26.2 AMBULATORY DYSFUNCTION: ICD-10-CM

## 2024-12-03 DIAGNOSIS — K59.00 CONSTIPATION, UNSPECIFIED CONSTIPATION TYPE: ICD-10-CM

## 2024-12-03 DIAGNOSIS — F02.80 LATE ONSET ALZHEIMER'S DISEASE WITHOUT BEHAVIORAL DISTURBANCE (HCC): ICD-10-CM

## 2024-12-03 PROCEDURE — 99309 SBSQ NF CARE MODERATE MDM 30: CPT | Performed by: INTERNAL MEDICINE

## 2024-12-03 NOTE — ASSESSMENT & PLAN NOTE
Continues to slowly decline  Continue folic acid, vit D, vitamin B12 and sertraline   Continues to require help with ADLs  Continue with safety measures  Continue with fall precautions  Continue to encourage fluids and oral intake

## 2024-12-03 NOTE — ASSESSMENT & PLAN NOTE
Seems stable  No complaints  Continue with senna and miralax  Continue monitoring symptoms   Continue with bowel protocol

## 2024-12-03 NOTE — ASSESSMENT & PLAN NOTE
Multifactorial  Does ambulate around the unit but slow at times unsteady  Continue with safety measures  Continue with fall precautions

## 2024-12-03 NOTE — ASSESSMENT & PLAN NOTE
BP reviewed from facility records, acceptable range with few elevated readings   Continue lisinopril   Continue monitoring BP

## 2024-12-03 NOTE — PROGRESS NOTES
[facility] Nursing home notes  LONG TERM CARE       NAME: Farida Whitaker  AGE: 89 y.o. SEX: female    DATE OF ENCOUNTER: 12/3/2024    Assessment and Plan   Essential hypertension  BP reviewed from facility records, acceptable range with few elevated readings   Continue lisinopril   Continue monitoring BP        Late onset Alzheimer's disease without behavioral disturbance (HCC)  Continues to slowly decline  Continue folic acid, vit D, vitamin B12 and sertraline   Continues to require help with ADLs  Continue with safety measures  Continue with fall precautions  Continue to encourage fluids and oral intake       Constipation  Seems stable  No complaints  Continue with senna and miralax  Continue monitoring symptoms   Continue with bowel protocol     Ambulatory dysfunction  Multifactorial  Does ambulate around the unit but slow at times unsteady  Continue with safety measures  Continue with fall precautions     Gastroesophageal reflux disease without esophagitis  Seems to be doing well  Off of meds  Continue monitoring symptoms         Chief Complaint     No new complaints     History of Present Illness     88 yo female seen for monthly visit and med renewal. Patient seen for HTN, dementia, constipation, ambulatory dysfunction and GERD. Reviewed nursing notes since last visit.     PMHx     Past Medical History:   Diagnosis Date    Alzheimer's dementia (HCC)     COVID-19 2020    Depression     Ground glass opacity present on imaging of lung 2020    HLD (hyperlipidemia)     Hypothyroid     Low O2 saturation 2020    Neurocognitive disorder     T2DM (type 2 diabetes mellitus) (HCC)      Past Surgical History:   Procedure Laterality Date     SECTION      HYSTERECTOMY       Family History   Problem Relation Age of Onset    Stomach cancer Mother     No Known Problems Father      Social History     Socioeconomic History    Marital status:      Spouse name: Not on file    Number of  children: Not on file    Years of education: Not on file    Highest education level: Not on file   Occupational History    Not on file   Tobacco Use    Smoking status: Never    Smokeless tobacco: Never   Vaping Use    Vaping status: Never Used   Substance and Sexual Activity    Alcohol use: Not Currently    Drug use: Not Currently    Sexual activity: Not Currently   Other Topics Concern    Not on file   Social History Narrative    Not on file     Social Drivers of Health     Financial Resource Strain: Not on file   Food Insecurity: Not on file   Transportation Needs: Not on file   Physical Activity: Not on file   Stress: Not on file   Social Connections: Not on file   Intimate Partner Violence: Not on file   Housing Stability: Not on file     Allergies   Allergen Reactions    Other      All dairy products     Tilactase        Review of Systems     Denies any pain or shortness of breath   All other review of system negative but hx limited due to dementia       Objective   Vital signs reviewed from facility records.     PHYSICAL EXAM:  GENERAL: no acute distress  SKIN: warm, dry, no rash, no cyanosis  HEENT: normocephalic, atraumatic, no JVD, no Thyromegaly, no lymphadenopathy  LUNGS: CTA, no wheezing, no rales, expanded equally, no chest tenderness   HEART: normal rhythm, normal rate, no murmur, no gallop  ABDOMEN: soft non tender non distended bs+, no guarding or rebound tenderness  :  no suprapubic tenderness  MUSCULOSKELETAL: strength about 4+/5 all extremities, ROM within normal,  no calf tenderness  NEUROLOGY: awake, alert, CN2-12 intact.   PSYCH: cooperative, pleasant.       Pertinent Laboratory/Diagnostic Studies:  Recent labs and diagnostic tests reviewed in nursing home EMR    Current Medications   Medications reviewed from nursing home EMR.  Prescriptions drug management - Patient prescriptions sent to pharmacy via renewal in [facility]         ESA SANCHEZ MD

## 2024-12-20 ENCOUNTER — NURSING HOME VISIT (OUTPATIENT)
Dept: GERIATRICS | Facility: OTHER | Age: 89
End: 2024-12-20

## 2024-12-20 DIAGNOSIS — E11.9 TYPE 2 DIABETES MELLITUS WITHOUT COMPLICATION, WITH LONG-TERM CURRENT USE OF INSULIN (HCC): Primary | ICD-10-CM

## 2024-12-20 DIAGNOSIS — Z79.4 TYPE 2 DIABETES MELLITUS WITHOUT COMPLICATION, WITH LONG-TERM CURRENT USE OF INSULIN (HCC): Primary | ICD-10-CM

## 2024-12-20 DIAGNOSIS — R29.6 FALLS: ICD-10-CM

## 2024-12-20 DIAGNOSIS — I10 ESSENTIAL HYPERTENSION: ICD-10-CM

## 2024-12-20 DIAGNOSIS — R26.2 AMBULATORY DYSFUNCTION: ICD-10-CM

## 2024-12-22 VITALS
BODY MASS INDEX: 25.22 KG/M2 | OXYGEN SATURATION: 95 % | RESPIRATION RATE: 18 BRPM | HEART RATE: 74 BPM | DIASTOLIC BLOOD PRESSURE: 74 MMHG | SYSTOLIC BLOOD PRESSURE: 144 MMHG | WEIGHT: 133.5 LBS | TEMPERATURE: 97.6 F

## 2024-12-22 PROBLEM — R29.6 FALLS: Status: ACTIVE | Noted: 2024-12-22

## 2024-12-22 RX ORDER — METFORMIN HYDROCHLORIDE 500 MG/1
500 TABLET, EXTENDED RELEASE ORAL
Start: 2024-12-22

## 2024-12-22 NOTE — PROGRESS NOTES
[facility]  [address] [facility] [address]  [phone]  FACILITY: [facility]  Code 32  Follow up visit       NAME: Farida Whitaker  AGE: 89 y.o. SEX: female CODE STATUS: No CPR    DATE OF ENCOUNTER: 12/20/24    Assessment and Plan     1. Type 2 diabetes mellitus without complication, with long-term current use of insulin (HCC)  Assessment & Plan:    Lab Results   Component Value Date    HGBA1C 8.2 (H) 02/21/2024 11/12 A1c 5.9  Well controlled for age  Avoid hypoglycemia; denies symptoms   Continue with lantus and metformin; consider lowering lantus if BS remains lower. Would be cautious due to risk of hypoglycemia and increased risk of falls.   Continue monitoring blood sugars  Continue with diabetic diet   Orders:  -     metFORMIN (GLUCOPHAGE-XR) 500 mg 24 hr tablet; Take 1 tablet (500 mg total) by mouth daily with breakfast  2. Essential hypertension  Assessment & Plan:  BP stable, SBP generally 110s-140s  No acute cardiac complaints   Avoid hypotension   Continue Lisinopril   Continue monitoring      3. Ambulatory dysfunction  Assessment & Plan:  Multifactorial- acute and chronic conditions  Continue PT/OT   Assist with ADLs  Encourage PO nutrition and hydration.  Encourage appropriate DME use   Maintain fall and safety precautions.    4. Falls  Assessment & Plan:  Per nursing patient slipped out of the bed, patient denies head strike or injury.   Upon exam patient denies pain/tenderness, no injury/brusing seen.   Patient unable to tell me how fall occurred due to cognition.   Encourage appropriate DME use  Maintain fall and safety precautions   Continue to monitor        All medications and routine orders were reviewed and updated as needed.    Chief Complaint     LT acute visit    Past Medical and Surgica History      Past Medical History:   Diagnosis Date    Alzheimer's dementia (HCC)     COVID-19 11/24/2020    Depression     Ground glass opacity present on imaging of lung 4/8/2020    HLD  (hyperlipidemia)     Hypothyroid     Low O2 saturation 2020    Neurocognitive disorder     T2DM (type 2 diabetes mellitus) (HCC)      Past Surgical History:   Procedure Laterality Date     SECTION      HYSTERECTOMY       Allergies   Allergen Reactions    Other      All dairy products     Tilactase         History of Present Illness     Farida Whitaker is a 89-year-old female patient residing at [facility]. Patient being seen and examined today due to reported fall last evening. Per nursing patient slipped out of the bed, patient denies head strike or injury. Upon exam patient was playing solitaire in her room. Patient is in good spirits and states she is overall doing good. She denies any injury or pain from fall, neuro intact. She states she is sleeping well, appetite is good and having regular bowel movements. Last BM  per records. Patient is in no acute distress, denies CP, SOB, abdominal pain, N/V/C/D.     The patient's allergies, past medical, surgical, social and family history were reviewed and unchanged.    Review of Systems     Review of Systems   Reason unable to perform ROS: limited due to dementia.   Constitutional:  Negative for chills, fatigue and fever.   Respiratory:  Negative for shortness of breath.    Cardiovascular:  Negative for chest pain and palpitations.   Gastrointestinal:  Negative for abdominal pain, constipation, diarrhea, nausea and vomiting.   Genitourinary:  Negative for difficulty urinating, dysuria and hematuria.   Neurological:  Negative for dizziness, light-headedness and headaches.   All other systems reviewed and are negative.      Objective     Vitals:   Vitals:    24 1112   BP: 144/74   Pulse: 74   Resp: 18   Temp: 97.6 °F (36.4 °C)   SpO2: 95%     Chemistry Profile:   Results from Last 12 Months   Lab Units 24  0420   POTASSIUM mmol/L 4.5   CHLORIDE mmol/L 101   CO2 mmol/L 28   BUN mg/dL 12   CREATININE mg/dL 0.61   GLUCOSE RANDOM mg/dL 182*    CALCIUM mg/dL 8.6   AST U/L 16   ALT U/L 16   ALK PHOS U/L 76   EGFR  85       Physical Exam  Vitals and nursing note reviewed.   Constitutional:       General: She is not in acute distress.     Appearance: Normal appearance. She is not ill-appearing.   HENT:      Head: Normocephalic and atraumatic.      Right Ear: External ear normal.      Left Ear: External ear normal.      Nose: Nose normal. No congestion or rhinorrhea.      Mouth/Throat:      Mouth: Mucous membranes are dry.   Eyes:      General: No scleral icterus.        Right eye: No discharge.         Left eye: No discharge.      Conjunctiva/sclera: Conjunctivae normal.   Cardiovascular:      Rate and Rhythm: Normal rate and regular rhythm.   Pulmonary:      Effort: No respiratory distress.      Breath sounds: Wheezing (slight inspiratory wheeze) present. No rhonchi or rales.   Abdominal:      General: Bowel sounds are normal. There is no distension.      Tenderness: There is no abdominal tenderness. There is no guarding or rebound.   Musculoskeletal:      Right lower leg: No edema.      Left lower leg: No edema.   Skin:     General: Skin is warm and dry.   Neurological:      Mental Status: She is alert. Mental status is at baseline.      Motor: Weakness present.      Gait: Gait abnormal.   Psychiatric:         Mood and Affect: Mood normal.         Behavior: Behavior normal.         Pertinent Laboratory/Diagnostic Studies:   Reviewed in facility chart-stable    Current Medications   Medications reviewed and updated see facility MAR for details.      Current Outpatient Medications:     metFORMIN (GLUCOPHAGE-XR) 500 mg 24 hr tablet, Take 1 tablet (500 mg total) by mouth daily with breakfast, Disp: , Rfl:     acetaminophen (TYLENOL) 325 mg tablet, Take 650 mg by mouth every 6 (six) hours as needed for mild pain, Disp: , Rfl:     cholecalciferol (VITAMIN D3) 1,000 units tablet, Take 1,000 Units by mouth daily, Disp: , Rfl:     folic acid (FOLVITE) 1 mg tablet,  "Take 1 mg by mouth daily, Disp: , Rfl:     insulin glargine (LANTUS) 100 units/mL subcutaneous injection, Inject 11 Units under the skin daily at bedtime, Disp: , Rfl:     lactase (LACTAID) 3,000 units tablet, Take 9,000 Units by mouth 3 (three) times a day with meals, Disp: , Rfl:     levothyroxine 125 mcg tablet, Take 125 mcg by mouth daily, Disp: , Rfl:     lisinopril (ZESTRIL) 2.5 mg tablet, Take 2.5 mg by mouth daily, Disp: , Rfl:     melatonin 3 mg, Take 3 mg by mouth daily at bedtime, Disp: , Rfl:     polyethylene glycol (MIRALAX) 17 g packet, Take 17 g by mouth daily, Disp: 14 each, Rfl: 0    senna (SENOKOT) 8.6 mg, Take 1 tablet (8.6 mg total) by mouth daily at bedtime, Disp: 120 each, Rfl: 0    sertraline (ZOLOFT) 50 mg tablet, Take 50 mg by mouth daily, Disp: , Rfl:     vitamin B-12 (VITAMIN B-12) 1,000 mcg tablet, Take by mouth daily, Disp: , Rfl:        Please note:  Voice-recognition software may have been used in the preparation of this document.  Occasional wrong word or \"sound-alike\" substitutions may have occurred due to the inherent limitations of voice recognition software.  Interpretation should be guided by context.         AAW Haywood    "

## 2024-12-22 NOTE — ASSESSMENT & PLAN NOTE
Multifactorial- acute and chronic conditions  Continue PT/OT   Assist with ADLs  Encourage PO nutrition and hydration.  Encourage appropriate DME use   Maintain fall and safety precautions.

## 2024-12-22 NOTE — ASSESSMENT & PLAN NOTE
BP stable, SBP generally 110s-140s  No acute cardiac complaints   Avoid hypotension   Continue Lisinopril   Continue monitoring

## 2024-12-22 NOTE — ASSESSMENT & PLAN NOTE
Lab Results   Component Value Date    HGBA1C 8.2 (H) 02/21/2024 11/12 A1c 5.9  Well controlled for age  Avoid hypoglycemia; denies symptoms   Continue with lantus and metformin; consider lowering lantus if BS remains lower. Would be cautious due to risk of hypoglycemia and increased risk of falls.   Continue monitoring blood sugars  Continue with diabetic diet

## 2024-12-22 NOTE — ASSESSMENT & PLAN NOTE
Per nursing patient slipped out of the bed, patient denies head strike or injury.   Upon exam patient denies pain/tenderness, no injury/brusing seen.   Patient unable to tell me how fall occurred due to cognition.   Encourage appropriate DME use  Maintain fall and safety precautions   Continue to monitor

## 2025-01-09 ENCOUNTER — NURSING HOME VISIT (OUTPATIENT)
Dept: GERIATRICS | Facility: OTHER | Age: OVER 89
End: 2025-01-09
Payer: MEDICARE

## 2025-01-09 VITALS
SYSTOLIC BLOOD PRESSURE: 130 MMHG | HEART RATE: 90 BPM | WEIGHT: 133.4 LBS | DIASTOLIC BLOOD PRESSURE: 72 MMHG | RESPIRATION RATE: 18 BRPM | TEMPERATURE: 97.5 F | OXYGEN SATURATION: 95 % | BODY MASS INDEX: 25.21 KG/M2

## 2025-01-09 DIAGNOSIS — Z79.4 TYPE 2 DIABETES MELLITUS WITHOUT COMPLICATION, WITH LONG-TERM CURRENT USE OF INSULIN (HCC): ICD-10-CM

## 2025-01-09 DIAGNOSIS — I10 ESSENTIAL HYPERTENSION: ICD-10-CM

## 2025-01-09 DIAGNOSIS — E11.9 TYPE 2 DIABETES MELLITUS WITHOUT COMPLICATION, WITH LONG-TERM CURRENT USE OF INSULIN (HCC): ICD-10-CM

## 2025-01-09 DIAGNOSIS — E06.3 HYPOTHYROIDISM DUE TO HASHIMOTO'S THYROIDITIS: ICD-10-CM

## 2025-01-09 DIAGNOSIS — G30.1 LATE ONSET ALZHEIMER'S DISEASE WITHOUT BEHAVIORAL DISTURBANCE (HCC): Primary | ICD-10-CM

## 2025-01-09 DIAGNOSIS — K21.9 GASTROESOPHAGEAL REFLUX DISEASE WITHOUT ESOPHAGITIS: ICD-10-CM

## 2025-01-09 DIAGNOSIS — R26.2 AMBULATORY DYSFUNCTION: ICD-10-CM

## 2025-01-09 DIAGNOSIS — F02.80 LATE ONSET ALZHEIMER'S DISEASE WITHOUT BEHAVIORAL DISTURBANCE (HCC): Primary | ICD-10-CM

## 2025-01-09 PROCEDURE — 99309 SBSQ NF CARE MODERATE MDM 30: CPT

## 2025-01-10 NOTE — ASSESSMENT & PLAN NOTE
Lab Results   Component Value Date    HGBA1C 8.2 (H) 02/21/2024 11/12 A1c 5.9  Well controlled for age  Fasting BS over the last month , generally in the 80s-90s.   Avoid hypoglycemia; denies symptoms   Will lower her Lantus to 9 units qHS and continue metformin 500mg daily.  Would be cautious due to risk of hypoglycemia and increased risk of falls.   Continue monitoring blood sugars  Continue with diabetic diet

## 2025-01-10 NOTE — ASSESSMENT & PLAN NOTE
Multifactorial- acute and chronic conditions  Assist with ADLs  Encourage PO nutrition and hydration.  Encourage appropriate DME use   Maintain fall and safety precautions.

## 2025-01-10 NOTE — PROGRESS NOTES
[facility]  [address] [facility] [address]  [phone]  FACILITY: [facility]   Code 32  Follow up visit       NAME: Farida Whitaker  AGE: 89 y.o. SEX: female CODE STATUS: No CPR    DATE OF ENCOUNTER: 1/9/25    Assessment and Plan     1. Late onset Alzheimer's disease without behavioral disturbance (HCC)  Assessment & Plan:  Continues to slowly decline  Continue folic acid, vit D, vitamin B12 and sertraline   Continues to require help with ADLs  Redirection, reorientation and distraction as appropriate   Fall/safety precautions  Supportive care, assistance with ADLs   Avoid deliriogenic medications   Encourage adequate hydration and nutrition   Maintain sleep/wake cycle    2. Essential hypertension  Assessment & Plan:  BP stable, SBP generally 110s-140s  No acute cardiac complaints   Avoid hypotension   Continue Lisinopril   Continue monitoring      3. Gastroesophageal reflux disease without esophagitis  Assessment & Plan:  No reported concerns  Not presently on medication  Avoid citrus, spicy, caffeine, and chocolate  Avoid eating/drinking 1 hour prior to laying down  OOB with meals  Continue monitoring   4. Hypothyroidism due to Hashimoto's thyroiditis  Assessment & Plan:  11/12 TSH 4.33<4.34<10.87  Continue levothyroxine 125mcg   Check TSH, fT4 in May for continued monitoring   Monitor for acute changes     5. Type 2 diabetes mellitus without complication, with long-term current use of insulin ([facility])  Assessment & Plan:    Lab Results   Component Value Date    HGBA1C 8.2 (H) 02/21/2024 11/12 A1c 5.9  Well controlled for age  Fasting BS over the last month , generally in the 80s-90s.   Avoid hypoglycemia; denies symptoms   Will lower her Lantus to 9 units qHS and continue metformin 500mg daily.  Would be cautious due to risk of hypoglycemia and increased risk of falls.   Continue monitoring blood sugars  Continue with diabetic diet   6. Ambulatory dysfunction  Assessment &  Plan:  Multifactorial- acute and chronic conditions  Assist with ADLs  Encourage PO nutrition and hydration.  Encourage appropriate DME use   Maintain fall and safety precautions.         All medications and routine orders were reviewed and updated as needed.    Chief Complaint     LTC follow up visit    Past Medical and Surgica History      Past Medical History:   Diagnosis Date    Alzheimer's dementia (HCC)     COVID-19 2020    Depression     Ground glass opacity present on imaging of lung 2020    HLD (hyperlipidemia)     Hypothyroid     Low O2 saturation 2020    Neurocognitive disorder     T2DM (type 2 diabetes mellitus) (HCC)      Past Surgical History:   Procedure Laterality Date     SECTION      HYSTERECTOMY       Allergies   Allergen Reactions    Other      All dairy products     Tilactase         History of Present Illness     Farida Whitaker is a 89-year-old female patient residing at [facility]. Patient being seen and examined today for follow up on acute and chronic medical conditions. Upon exam patient is getting ready to eat breakfast. Patient is in good spirits smiling. Patient appears to have poor comprehension, she answers yes to all questions asked. She is alert to self only. ROS of systems difficult due to cognition. Per nursing patients mood is stable, she plays cards by herself and has no reported behaviors. She states she is sleeping well, appetite is fair completing % of meals and having regular bowel movements. Last BM today per records. Patient is in no acute distress. Nursing note and labs reviewed.     The patient's allergies, past medical, surgical, social and family history were reviewed and unchanged.    Review of Systems     Review of Systems   Reason unable to perform ROS: limited due to dementia.   Constitutional:  Negative for chills, fatigue and fever.   Respiratory:  Negative for shortness of breath.    Cardiovascular:  Negative for chest pain.    Gastrointestinal:  Negative for abdominal pain, constipation, diarrhea, nausea and vomiting.   Genitourinary:  Negative for difficulty urinating, dysuria and hematuria.   Musculoskeletal:  Positive for gait problem. Negative for arthralgias.   All other systems reviewed and are negative.      Objective     Vitals:   Vitals:    01/09/25 1021   BP: 130/72   Pulse: 90   Resp: 18   Temp: 97.5 °F (36.4 °C)   SpO2: 95%       Physical Exam  Vitals and nursing note reviewed.   Constitutional:       General: She is not in acute distress.     Appearance: Normal appearance. She is not ill-appearing.   HENT:      Head: Normocephalic and atraumatic.      Right Ear: External ear normal.      Left Ear: External ear normal.      Nose: Nose normal. No congestion or rhinorrhea.      Mouth/Throat:      Mouth: Mucous membranes are dry.   Eyes:      General: No scleral icterus.        Right eye: No discharge.         Left eye: No discharge.      Conjunctiva/sclera: Conjunctivae normal.   Cardiovascular:      Rate and Rhythm: Normal rate and regular rhythm.   Pulmonary:      Effort: No respiratory distress.      Breath sounds: Wheezing (slight inspiratory wheeze) present. No rhonchi or rales.   Abdominal:      General: Bowel sounds are normal. There is no distension.      Tenderness: There is no abdominal tenderness. There is no guarding or rebound.   Musculoskeletal:      Right lower leg: Edema present.      Left lower leg: Edema present.      Comments: Trace BLE edema    Skin:     General: Skin is warm and dry.   Neurological:      Mental Status: She is alert. Mental status is at baseline.      Motor: Weakness present.      Gait: Gait abnormal.   Psychiatric:         Mood and Affect: Mood normal.         Behavior: Behavior normal.         Pertinent Laboratory/Diagnostic Studies:   Reviewed in facility chart-stable    Current Medications   Medications reviewed and updated see facility MAR for details.      Current Outpatient  "Medications:     acetaminophen (TYLENOL) 325 mg tablet, Take 650 mg by mouth every 6 (six) hours as needed for mild pain, Disp: , Rfl:     cholecalciferol (VITAMIN D3) 1,000 units tablet, Take 1,000 Units by mouth daily, Disp: , Rfl:     folic acid (FOLVITE) 1 mg tablet, Take 1 mg by mouth daily, Disp: , Rfl:     insulin glargine (LANTUS) 100 units/mL subcutaneous injection, Inject 11 Units under the skin daily at bedtime, Disp: , Rfl:     lactase (LACTAID) 3,000 units tablet, Take 9,000 Units by mouth 3 (three) times a day with meals, Disp: , Rfl:     levothyroxine 125 mcg tablet, Take 125 mcg by mouth daily, Disp: , Rfl:     lisinopril (ZESTRIL) 2.5 mg tablet, Take 2.5 mg by mouth daily, Disp: , Rfl:     melatonin 3 mg, Take 3 mg by mouth daily at bedtime, Disp: , Rfl:     metFORMIN (GLUCOPHAGE-XR) 500 mg 24 hr tablet, Take 1 tablet (500 mg total) by mouth daily with breakfast, Disp: , Rfl:     polyethylene glycol (MIRALAX) 17 g packet, Take 17 g by mouth daily, Disp: 14 each, Rfl: 0    senna (SENOKOT) 8.6 mg, Take 1 tablet (8.6 mg total) by mouth daily at bedtime, Disp: 120 each, Rfl: 0    sertraline (ZOLOFT) 50 mg tablet, Take 50 mg by mouth daily, Disp: , Rfl:     vitamin B-12 (VITAMIN B-12) 1,000 mcg tablet, Take by mouth daily, Disp: , Rfl:        Please note:  Voice-recognition software may have been used in the preparation of this document.  Occasional wrong word or \"sound-alike\" substitutions may have occurred due to the inherent limitations of voice recognition software.  Interpretation should be guided by context.         AWA Haywood    "

## 2025-01-10 NOTE — ASSESSMENT & PLAN NOTE
No reported concerns  Not presently on medication  Avoid citrus, spicy, caffeine, and chocolate  Avoid eating/drinking 1 hour prior to laying down  OOB with meals  Continue monitoring

## 2025-01-10 NOTE — ASSESSMENT & PLAN NOTE
Continues to slowly decline  Continue folic acid, vit D, vitamin B12 and sertraline   Continues to require help with ADLs  Redirection, reorientation and distraction as appropriate   Fall/safety precautions  Supportive care, assistance with ADLs   Avoid deliriogenic medications   Encourage adequate hydration and nutrition   Maintain sleep/wake cycle

## 2025-01-10 NOTE — ASSESSMENT & PLAN NOTE
11/12 TSH 4.33<4.34<10.87  Continue levothyroxine 125mcg   Check TSH, fT4 in May for continued monitoring   Monitor for acute changes

## 2025-01-14 NOTE — PROGRESS NOTES
[facility]  [address]  [phone]  Facility: [address]/          NAME: Sumanth Magana  AGE: 80 y o  SEX: female    DATE OF ENCOUNTER: 3/18/2021    Chief Complaint     Pt has no complaint     History of Present Illness     HPI    The following portions of the patient's history were reviewed and updated as appropriate (from facility chart and hospital records): allergies, current medications, past family history, past medical history, past social history, past surgical history and problem list  Pt was seen and examined for f/u on Dementia, DM, Hypothyroidism, HTN, GERD  Pt continues with NH care  Pt has been stable  Wt stable  BS stable  Last TSH in Feb at high level, she will have f/u TSH next week  NO GERD symptoms  No behaviors  Continues with NH care  Review of Systems     Review of Systems   Unable to perform ROS: Dementia   Musculoskeletal:        Pt denies any pain        Active Problem List     Patient Active Problem List   Diagnosis    Type 2 diabetes mellitus without complication, without long-term current use of insulin ([address] )    Mixed hyperlipidemia    Late onset [facility]'s disease without behavioral disturbance ([facility] [address] )    Essential hypertension    Hypothyroidism due to Hashimoto's thyroiditis    Constipation    Failure to thrive in adult    Ambulatory dysfunction    Gastroesophageal reflux disease without esophagitis    Low O2 saturation       Objective     Vitals: wt:153Ibs in Feb    BP:118/72   IL:75    Afebrile   BS log reviewed     Physical Exam  Vitals signs and nursing note reviewed  Constitutional:       General: She is not in acute distress  Appearance: Normal appearance  She is well-developed  She is not ill-appearing, toxic-appearing or diaphoretic  HENT:      Head: Normocephalic and atraumatic        Right Ear: External ear normal       Left Ear: External ear normal       Ears:      Comments: [place]     Nose: Nose normal  Patient presents to the ED via personal vehicle with daughter. Patient reports dizziness, headache, and dry mouth x 2 days. Denies n/v/f/diarrhea, numbness, weakness, tingling, slurred speech.   No congestion or rhinorrhea  Mouth/Throat:      Comments: Dentures   Eyes:      General: No scleral icterus  Right eye: No discharge  Left eye: No discharge  Conjunctiva/sclera: Conjunctivae normal       Pupils: Pupils are equal, round, and reactive to light  Neck:      Musculoskeletal: Normal range of motion and neck supple  No neck rigidity or muscular tenderness  Cardiovascular:      Rate and Rhythm: Normal rate and regular rhythm  Heart sounds: Normal heart sounds  No murmur  No friction rub  No gallop  Pulmonary:      Effort: Pulmonary effort is normal  No respiratory distress  Breath sounds: Normal breath sounds  No stridor  No wheezing, rhonchi or rales  Chest:      Chest wall: No tenderness  Abdominal:      General: Abdomen is flat  Bowel sounds are normal  There is no distension  Palpations: Abdomen is soft  There is no mass  Tenderness: There is no abdominal tenderness  There is no guarding or rebound  Hernia: No hernia is present  Genitourinary:     Comments: Deferred  Musculoskeletal:         General: No swelling, tenderness, deformity or signs of injury  Right lower leg: Edema present  Left lower leg: Edema present  Comments: In WC sitting, moves UEs and LEs with limited ROM   Lymphadenopathy:      Cervical: No cervical adenopathy  Skin:     General: Skin is warm and dry  Coloration: Skin is not jaundiced or pale  Findings: No bruising, erythema, lesion or rash  Comments: Didn't examine sacral area  Neurological:      Mental Status: She is alert  Cranial Nerves: Cranial nerve deficit present  Comments: Not oriented, following simple commands  Psychiatric:         Behavior: Behavior normal          Pertinent Laboratory/Diagnostic Studies:  TSH next week,  Feb: CBC, CMP, HbA1C:6 9    Current Medications   Medication list in facility chart was reviewed and no changes made         Assessment and Plan   Type 2 diabetes mellitus without complication, without long-term current use of insulin (HCC)  Last HbA1c in Feb good at 6 9, stable  Will continue with monitoring  On Lantus  Hypothyroidism due to Hashimoto's thyroiditis  TSH was elevated in Feb  Next TSH will be next week  On Levothyroxine  Will continue with monitoring  Essential hypertension  BP stable with Lisinopril and Amlodipine  Last BMP done in Feb and stable  Late onset Alzheimer's disease without behavioral disturbance (HCC)  No Behaviors  Wt stable  Will continue with NH and monitoring  Gastroesophageal reflux disease without esophagitis  Symptoms controlled with Omeprazole  Will continue with monitoring         Yamel Desai MD  7/73/92166:16 PM No

## 2025-02-04 ENCOUNTER — NURSING HOME VISIT (OUTPATIENT)
Dept: GERIATRICS | Facility: OTHER | Age: OVER 89
End: 2025-02-04
Payer: MEDICARE

## 2025-02-04 DIAGNOSIS — F02.80 LATE ONSET ALZHEIMER'S DISEASE WITHOUT BEHAVIORAL DISTURBANCE (HCC): ICD-10-CM

## 2025-02-04 DIAGNOSIS — K59.00 CONSTIPATION, UNSPECIFIED CONSTIPATION TYPE: ICD-10-CM

## 2025-02-04 DIAGNOSIS — J06.9 VIRAL UPPER RESPIRATORY TRACT INFECTION: Primary | ICD-10-CM

## 2025-02-04 DIAGNOSIS — I10 ESSENTIAL HYPERTENSION: ICD-10-CM

## 2025-02-04 DIAGNOSIS — E73.9 LACTOSE INTOLERANCE: ICD-10-CM

## 2025-02-04 DIAGNOSIS — G30.1 LATE ONSET ALZHEIMER'S DISEASE WITHOUT BEHAVIORAL DISTURBANCE (HCC): ICD-10-CM

## 2025-02-04 PROCEDURE — 99309 SBSQ NF CARE MODERATE MDM 30: CPT | Performed by: INTERNAL MEDICINE

## 2025-02-04 RX ORDER — IPRATROPIUM BROMIDE AND ALBUTEROL SULFATE 2.5; .5 MG/3ML; MG/3ML
3 SOLUTION RESPIRATORY (INHALATION) 3 TIMES DAILY
COMMUNITY
End: 2025-02-11

## 2025-02-04 NOTE — ASSESSMENT & PLAN NOTE
BP reviewed from facility records, acceptable range   Continue lisinopril  Continue monitoring BP

## 2025-02-04 NOTE — ASSESSMENT & PLAN NOTE
Possibly due to FLU as flu going around   Already on tamiflu for prophylaxis  Will add duoneb and cough meds  Will get CBC and BMP  Continue to encourage oral intake and continue push fluids   Monitor vitals qshift x 72 hrs

## 2025-02-04 NOTE — PROGRESS NOTES
[facility] notes  LONG TERM CARE       NAME: Farida Whitaker  AGE: 89 y.o. SEX: female    DATE OF ENCOUNTER: 2025    Assessment and Plan   Viral upper respiratory tract infection  Possibly due to FLU as flu going around   Already on tamiflu for prophylaxis  Will add duoneb and cough meds  Will get CBC and BMP  Continue to encourage oral intake and continue push fluids   Monitor vitals qshift x 72 hrs     Late onset Alzheimer's disease without behavioral disturbance (HCC)  Continues to slowly decline  Continue folic acid, vit D, vitamin B12 and sertraline   Continues to require help with ADLs  Continue with safety measures  Continue with fall precautions  Continue to encourage fluids and oral intake       Constipation  Seems stable  Continue with senna and miralax  Continue monitoring symptoms   Continue with bowel protocol    Lactose intolerance  No reported issues  Continue with lactaid   Continue monitoring symptoms     Essential hypertension  BP reviewed from facility records, acceptable range   Continue lisinopril  Continue monitoring BP            Chief Complaint     Unable to get due to dementia  Information obtained from staff taking care of patient      History of Present Illness     89 yo female seen for monthly visit and med renewal. Patient seen for URI, dementia, constipation, lactose intolerance and HTN. Reviewed nursing notes since last visit.     PMHx     Past Medical History:   Diagnosis Date    Alzheimer's dementia (HCC)     COVID-19 2020    Depression     Ground glass opacity present on imaging of lung 2020    HLD (hyperlipidemia)     Hypothyroid     Low O2 saturation 2020    Neurocognitive disorder     T2DM (type 2 diabetes mellitus) (HCC)      Past Surgical History:   Procedure Laterality Date     SECTION      HYSTERECTOMY       Family History   Problem Relation Age of Onset    Stomach cancer Mother     No Known Problems Father      Social  History     Socioeconomic History    Marital status:      Spouse name: None    Number of children: None    Years of education: None    Highest education level: None   Occupational History    None   Tobacco Use    Smoking status: Never    Smokeless tobacco: Never   Vaping Use    Vaping status: Never Used   Substance and Sexual Activity    Alcohol use: Not Currently    Drug use: Not Currently    Sexual activity: Not Currently   Other Topics Concern    None   Social History Narrative    None     Social Drivers of Health     Financial Resource Strain: Not on file   Food Insecurity: Not on file   Transportation Needs: Not on file   Physical Activity: Not on file   Stress: Not on file   Social Connections: Not on file   Intimate Partner Violence: Not on file   Housing Stability: Not on file     Allergies   Allergen Reactions    Other      All dairy products     Tilactase        Review of Systems     Unable to get due to dementia  Information obtained from staff taking care of patient  As per staff patient having wheezing and slightly more confused then baseline     Objective   Vital signs reviewed from facility records.     PHYSICAL EXAM:  GENERAL: no acute distress  SKIN: warm, dry, no rash, no cyanosis  HEENT: normocephalic, atraumatic, no JVD, no Thyromegaly, no lymphadenopathy  LUNGS: +wheezing, no rales, expanded equally, no chest tenderness   HEART: normal rhythm, normal rate, no murmur, no gallop  ABDOMEN: soft non tender non distended bs+, no guarding or rebound tenderness  :  no suprapubic tenderness  MUSCULOSKELETAL: strength about 4+/5 all extremities,  no calf tenderness  NEUROLOGY: awake, alert, CN2-12 intact.   PSYCH: cooperative, pleasant.       Pertinent Laboratory/Diagnostic Studies:  Recent labs and diagnostic tests reviewed in nursing home EMR    Current Medications   Medications reviewed from nursing home EMR.  Prescriptions drug management - Patient prescriptions sent to pharmacy via renewal  in [facility]         ESA SANCHEZ MD

## 2025-02-04 NOTE — ASSESSMENT & PLAN NOTE
Seems stable  Continue with senna and miralax  Continue monitoring symptoms   Continue with bowel protocol

## 2025-03-06 PROBLEM — J06.9 VIRAL UPPER RESPIRATORY TRACT INFECTION: Status: RESOLVED | Noted: 2025-02-04 | Resolved: 2025-03-06

## 2025-03-09 NOTE — PROGRESS NOTES
[facility]  [address] [address]  [phone]  FACILITY: [facility]   Code 32  Follow up visit       NAME: Farida Whitaker  AGE: 89 y.o. SEX: female CODE STATUS: No CPR    DATE OF ENCOUNTER: 3/10/2025    Assessment and Plan     1. Essential hypertension  Assessment & Plan:  BP stable, SBP generally 100s-140s  No acute cardiac complaints   Avoid hypotension   Continue Lisinopril   Continue monitoring      2. Gastroesophageal reflux disease without esophagitis  Assessment & Plan:  No reported concerns  Not presently on medication  Avoid citrus, spicy, caffeine, and chocolate  Avoid eating/drinking 1 hour prior to laying down  OOB with meals  Continue monitoring   3. Type 2 diabetes mellitus without complication, with long-term current use of insulin (HCC)  Assessment & Plan:    Lab Results   Component Value Date    HGBA1C 8.2 (H) 02/21/2024 11/12 A1c 5.9  Well controlled for age  Fasting BS over the last month   Avoid hypoglycemia  Continue Lantus 9 units qHS and appears MD discontinued metformin due to controlled BS.   Continue monitoring blood sugars  Continue with diabetic diet   4. Late onset Alzheimer's disease without behavioral disturbance (HCC)  Assessment & Plan:  Continues to slowly decline  Continue folic acid, vit D, vitamin B12 and sertraline   Continues to require help with ADLs  Redirection, reorientation and distraction as appropriate   Fall/safety precautions  Supportive care, assistance with ADLs   Avoid deliriogenic medications   Encourage adequate hydration and nutrition   Maintain sleep/wake cycle  5. Ambulatory dysfunction  Assessment & Plan:  Multifactorial- acute and chronic conditions  Assist with ADLs  Encourage PO nutrition and hydration.  Encourage appropriate DME use   Maintain fall and safety precautions.    6. Acute cystitis without hematuria  Assessment & Plan:  Per nursing patient was more confused over the last week.   Positive for UTI 3/8/25  Cephalexin  500mg BID x5 days  Patient denies urinary complaints  Continue to monitor mentation.        All medications and routine orders were reviewed and updated as needed.    Chief Complaint     LT follow up visit    Past Medical and Surgica History      Past Medical History:   Diagnosis Date    Alzheimer's dementia (HCC)     COVID-19 2020    Depression     Ground glass opacity present on imaging of lung 2020    HLD (hyperlipidemia)     Hypothyroid     Low O2 saturation 2020    Neurocognitive disorder     T2DM (type 2 diabetes mellitus) (HCC)      Past Surgical History:   Procedure Laterality Date     SECTION      HYSTERECTOMY       Allergies   Allergen Reactions    Other      All dairy products     Tilactase         History of Present Illness     Farida Whitaker is a 89-year-old female patient residing at [facility]. Patient being seen and examined today for follow up on acute and chronic medical conditions. Upon exam patient is resting in bed. Patient states she is tired and unwilling/able to answer most ROS questions, she nods yes and no. Patient appears to have poor comprehension. ROS of systems difficult due to cognition. Per nursing patients mood is stable, noted to be have increased confusion, no reported behaviors. Her appetite is low completing 0-50% of meals (patient getting protein supplementation), weight is down.  Patients last BM 3/9 per records. Patient is in no acute distress.   Nursing note and labs reviewed.     The patient's allergies, past medical, surgical, social and family history were reviewed and unchanged.    Review of Systems     Review of Systems   Reason unable to perform ROS: limited due to cognition.   Constitutional:  Positive for appetite change and fatigue. Negative for chills and fever.   Respiratory:  Negative for shortness of breath.    Cardiovascular:  Negative for chest pain.   Genitourinary:  Negative for difficulty urinating and dysuria.   All other systems  reviewed and are negative.      Objective     Vitals:   Vitals:    03/10/25 1514   BP: 141/74   Pulse: 90   Resp: 18   Temp: 98.9 °F (37.2 °C)   SpO2: 96%       Physical Exam  Vitals and nursing note reviewed.   Constitutional:       General: She is not in acute distress.     Appearance: Normal appearance. She is not ill-appearing.   HENT:      Head: Normocephalic and atraumatic.      Right Ear: External ear normal.      Left Ear: External ear normal.      Nose: Nose normal. No congestion or rhinorrhea.      Mouth/Throat:      Mouth: Mucous membranes are dry.   Eyes:      General: No scleral icterus.        Right eye: No discharge.         Left eye: No discharge.      Conjunctiva/sclera: Conjunctivae normal.   Cardiovascular:      Rate and Rhythm: Normal rate and regular rhythm.   Pulmonary:      Effort: No respiratory distress.      Breath sounds: No wheezing, rhonchi or rales.   Abdominal:      General: Bowel sounds are normal. There is no distension.      Tenderness: There is no abdominal tenderness. There is no guarding or rebound.   Musculoskeletal:      Right lower leg: Edema present.      Left lower leg: Edema present.      Comments: Trace BLE edema    Skin:     General: Skin is warm and dry.      Comments: Chronic intermittent rash on the right side of face.    Neurological:      Mental Status: She is alert. Mental status is at baseline.      Motor: Weakness present.      Gait: Gait abnormal.   Psychiatric:         Mood and Affect: Mood normal.         Pertinent Laboratory/Diagnostic Studies:   Reviewed in facility chart-stable    Current Medications   Medications reviewed and updated see facility MAR for details.      Current Outpatient Medications:     acetaminophen (TYLENOL) 325 mg tablet, Take 650 mg by mouth every 6 (six) hours as needed for mild pain, Disp: , Rfl:     cholecalciferol (VITAMIN D3) 1,000 units tablet, Take 1,000 Units by mouth daily, Disp: , Rfl:     folic acid (FOLVITE) 1 mg tablet,  "Take 1 mg by mouth daily, Disp: , Rfl:     insulin glargine (LANTUS) 100 units/mL subcutaneous injection, Inject 9 Units under the skin daily at bedtime, Disp: , Rfl:     lactase (LACTAID) 3,000 units tablet, Take 9,000 Units by mouth 3 (three) times a day with meals, Disp: , Rfl:     levothyroxine 125 mcg tablet, Take 125 mcg by mouth daily, Disp: , Rfl:     lisinopril (ZESTRIL) 2.5 mg tablet, Take 2.5 mg by mouth daily, Disp: , Rfl:     melatonin 3 mg, Take 3 mg by mouth daily at bedtime, Disp: , Rfl:     polyethylene glycol (MIRALAX) 17 g packet, Take 17 g by mouth daily, Disp: 14 each, Rfl: 0    senna (SENOKOT) 8.6 mg, Take 1 tablet (8.6 mg total) by mouth daily at bedtime, Disp: 120 each, Rfl: 0    sertraline (ZOLOFT) 50 mg tablet, Take 50 mg by mouth daily, Disp: , Rfl:     vitamin B-12 (VITAMIN B-12) 1,000 mcg tablet, Take by mouth daily, Disp: , Rfl:        Please note:  Voice-recognition software may have been used in the preparation of this document.  Occasional wrong word or \"sound-alike\" substitutions may have occurred due to the inherent limitations of voice recognition software.  Interpretation should be guided by context.         AWA Haywood    "

## 2025-03-10 ENCOUNTER — NURSING HOME VISIT (OUTPATIENT)
Dept: GERIATRICS | Facility: OTHER | Age: OVER 89
End: 2025-03-10
Payer: MEDICARE

## 2025-03-10 VITALS
RESPIRATION RATE: 18 BRPM | TEMPERATURE: 98.9 F | BODY MASS INDEX: 21.54 KG/M2 | SYSTOLIC BLOOD PRESSURE: 141 MMHG | OXYGEN SATURATION: 96 % | HEART RATE: 90 BPM | WEIGHT: 114 LBS | DIASTOLIC BLOOD PRESSURE: 74 MMHG

## 2025-03-10 DIAGNOSIS — K21.9 GASTROESOPHAGEAL REFLUX DISEASE WITHOUT ESOPHAGITIS: ICD-10-CM

## 2025-03-10 DIAGNOSIS — Z79.4 TYPE 2 DIABETES MELLITUS WITHOUT COMPLICATION, WITH LONG-TERM CURRENT USE OF INSULIN (HCC): ICD-10-CM

## 2025-03-10 DIAGNOSIS — N30.00 ACUTE CYSTITIS WITHOUT HEMATURIA: ICD-10-CM

## 2025-03-10 DIAGNOSIS — G30.1 LATE ONSET ALZHEIMER'S DISEASE WITHOUT BEHAVIORAL DISTURBANCE (HCC): ICD-10-CM

## 2025-03-10 DIAGNOSIS — F02.80 LATE ONSET ALZHEIMER'S DISEASE WITHOUT BEHAVIORAL DISTURBANCE (HCC): ICD-10-CM

## 2025-03-10 DIAGNOSIS — I10 ESSENTIAL HYPERTENSION: Primary | ICD-10-CM

## 2025-03-10 DIAGNOSIS — R26.2 AMBULATORY DYSFUNCTION: ICD-10-CM

## 2025-03-10 DIAGNOSIS — E11.9 TYPE 2 DIABETES MELLITUS WITHOUT COMPLICATION, WITH LONG-TERM CURRENT USE OF INSULIN (HCC): ICD-10-CM

## 2025-03-10 PROBLEM — N39.0 UTI (URINARY TRACT INFECTION): Status: ACTIVE | Noted: 2025-03-10

## 2025-03-10 PROCEDURE — 99309 SBSQ NF CARE MODERATE MDM 30: CPT

## 2025-03-10 NOTE — ASSESSMENT & PLAN NOTE
Lab Results   Component Value Date    HGBA1C 8.2 (H) 02/21/2024 11/12 A1c 5.9  Well controlled for age  Fasting BS over the last month   Avoid hypoglycemia  Continue Lantus 9 units qHS and appears MD discontinued metformin due to controlled BS.   Continue monitoring blood sugars  Continue with diabetic diet

## 2025-03-10 NOTE — ASSESSMENT & PLAN NOTE
Per nursing patient was more confused over the last week.   Positive for UTI 3/8/25  Cephalexin 500mg BID x5 days  Patient denies urinary complaints  Continue to monitor mentation.

## 2025-03-10 NOTE — ASSESSMENT & PLAN NOTE
BP stable, SBP generally 100s-140s  No acute cardiac complaints   Avoid hypotension   Continue Lisinopril   Continue monitoring

## 2025-04-03 ENCOUNTER — NURSING HOME VISIT (OUTPATIENT)
Dept: GERIATRICS | Facility: OTHER | Age: OVER 89
End: 2025-04-03
Payer: MEDICARE

## 2025-04-03 DIAGNOSIS — E06.3 HYPOTHYROIDISM DUE TO HASHIMOTO'S THYROIDITIS: ICD-10-CM

## 2025-04-03 DIAGNOSIS — I10 ESSENTIAL HYPERTENSION: ICD-10-CM

## 2025-04-03 DIAGNOSIS — F02.80 LATE ONSET ALZHEIMER'S DISEASE WITHOUT BEHAVIORAL DISTURBANCE (HCC): Primary | ICD-10-CM

## 2025-04-03 DIAGNOSIS — K59.00 CONSTIPATION, UNSPECIFIED CONSTIPATION TYPE: ICD-10-CM

## 2025-04-03 DIAGNOSIS — E73.9 LACTOSE INTOLERANCE: ICD-10-CM

## 2025-04-03 DIAGNOSIS — G30.1 LATE ONSET ALZHEIMER'S DISEASE WITHOUT BEHAVIORAL DISTURBANCE (HCC): Primary | ICD-10-CM

## 2025-04-03 PROCEDURE — 99309 SBSQ NF CARE MODERATE MDM 30: CPT | Performed by: INTERNAL MEDICINE

## 2025-04-03 NOTE — PROGRESS NOTES
[facility] Nursing home notes  LONG TERM CARE       NAME: Farida Whitaker  AGE: 89 y.o. SEX: female    DATE OF ENCOUNTER: 4/3/2025    Assessment and Plan   Late onset Alzheimer's disease without behavioral disturbance (HCC)  Continues to slowly decline  Continue folic acid, vit D, vitamin B12 and sertraline  Continues to require help with ADLs  Continue with safety measures  Continue with fall precautions  Continue to encourage fluids and oral intake       Essential hypertension  BP reviewed from facility records, acceptable range   Continue lisinopril   Continue monitoring BP        Constipation  Seems stable   No reported issues  Continue with bowel protocol   Continue monitoring symptoms     Hypothyroidism due to Hashimoto's thyroiditis  Continue levothyroxine  Last TSH in November was normal      Lactose intolerance  No reported issues  Continue with lactaid  Continue monitoring symptoms         Chief Complaint     No new complaints     History of Present Illness     89 yo female seen for monthly visit and med renewal. Patient seen for dementia, HTN, constipation, hypothyroidism and lactose intolerance. Reviewed nursing notes since last visit.     PMHx     Past Medical History:   Diagnosis Date    Alzheimer's dementia (HCC)     COVID-19 2020    Depression     Ground glass opacity present on imaging of lung 2020    HLD (hyperlipidemia)     Hypothyroid     Low O2 saturation 2020    Neurocognitive disorder     T2DM (type 2 diabetes mellitus) (HCC)      Past Surgical History:   Procedure Laterality Date     SECTION      HYSTERECTOMY       Family History   Problem Relation Age of Onset    Stomach cancer Mother     No Known Problems Father      Social History     Socioeconomic History    Marital status:      Spouse name: Not on file    Number of children: Not on file    Years of education: Not on file    Highest education level: Not on file   Occupational History    Not on  file   Tobacco Use    Smoking status: Never    Smokeless tobacco: Never   Vaping Use    Vaping status: Never Used   Substance and Sexual Activity    Alcohol use: Not Currently    Drug use: Not Currently    Sexual activity: Not Currently   Other Topics Concern    Not on file   Social History Narrative    Not on file     Social Drivers of Health     Financial Resource Strain: Not on file   Food Insecurity: Not on file   Transportation Needs: Not on file   Physical Activity: Not on file   Stress: Not on file   Social Connections: Not on file   Intimate Partner Violence: Not on file   Housing Stability: Not on file     Allergies   Allergen Reactions    Other      All dairy products     Tilactase        Review of Systems     Denies any pain or shortness of breath   All other review of system negative      Objective   Vital signs reviewed from facility records    PHYSICAL EXAM:  GENERAL: no acute distress  SKIN: warm, dry, + rash right cheek but reported to be improving, no cyanosis  HEENT: normocephalic, atraumatic, no JVD, no Thyromegaly, no lymphadenopathy  LUNGS: CTA, no wheezing, no rales, expanded equally, no chest tenderness   HEART: normal rhythm, normal rate, no murmur, no gallop  ABDOMEN: soft non tender non distended bs+, no guarding or rebound tenderness  :  no suprapubic tenderness  MUSCULOSKELETAL: strength about 4+/5 all extremities, no calf tenderness  NEUROLOGY: awake, alert, CN2-12 intact.   PSYCH: cooperative, pleasant.       Pertinent Laboratory/Diagnostic Studies:  Recent labs and diagnostic tests reviewed in nursing home EMR    Current Medications   Medications reviewed from nursing home EMR.  Prescriptions drug management - Patient prescriptions sent to pharmacy via renewal in [facility]         ESA SANCHEZ MD

## 2025-04-03 NOTE — ASSESSMENT & PLAN NOTE
BP reviewed from facility records, acceptable range   Continue lisinopril   Continue monitoring BP

## 2025-04-09 PROBLEM — N39.0 UTI (URINARY TRACT INFECTION): Status: RESOLVED | Noted: 2025-03-10 | Resolved: 2025-04-09

## 2025-05-06 ENCOUNTER — NURSING HOME VISIT (OUTPATIENT)
Dept: GERIATRICS | Facility: OTHER | Age: OVER 89
End: 2025-05-06
Payer: MEDICARE

## 2025-05-06 DIAGNOSIS — K21.9 GASTROESOPHAGEAL REFLUX DISEASE WITHOUT ESOPHAGITIS: ICD-10-CM

## 2025-05-06 DIAGNOSIS — E06.3 HYPOTHYROIDISM DUE TO HASHIMOTO'S THYROIDITIS: ICD-10-CM

## 2025-05-06 DIAGNOSIS — Z79.4 TYPE 2 DIABETES MELLITUS WITHOUT COMPLICATION, WITH LONG-TERM CURRENT USE OF INSULIN (HCC): ICD-10-CM

## 2025-05-06 DIAGNOSIS — R26.2 AMBULATORY DYSFUNCTION: ICD-10-CM

## 2025-05-06 DIAGNOSIS — G30.1 LATE ONSET ALZHEIMER'S DISEASE WITHOUT BEHAVIORAL DISTURBANCE (HCC): Primary | ICD-10-CM

## 2025-05-06 DIAGNOSIS — E11.9 TYPE 2 DIABETES MELLITUS WITHOUT COMPLICATION, WITH LONG-TERM CURRENT USE OF INSULIN (HCC): ICD-10-CM

## 2025-05-06 DIAGNOSIS — F02.80 LATE ONSET ALZHEIMER'S DISEASE WITHOUT BEHAVIORAL DISTURBANCE (HCC): Primary | ICD-10-CM

## 2025-05-06 DIAGNOSIS — I10 ESSENTIAL HYPERTENSION: ICD-10-CM

## 2025-05-06 PROCEDURE — 99309 SBSQ NF CARE MODERATE MDM 30: CPT

## 2025-05-07 VITALS
SYSTOLIC BLOOD PRESSURE: 141 MMHG | RESPIRATION RATE: 18 BRPM | DIASTOLIC BLOOD PRESSURE: 70 MMHG | HEART RATE: 84 BPM | TEMPERATURE: 97.8 F | OXYGEN SATURATION: 93 % | WEIGHT: 124 LBS | BODY MASS INDEX: 23.43 KG/M2

## 2025-05-07 NOTE — ASSESSMENT & PLAN NOTE
11/12 TSH 4.33<4.34<10.87  Continue levothyroxine 125mcg   Check TSH, fT4 tomorrow 5/8  Monitor for acute changes

## 2025-05-07 NOTE — PROGRESS NOTES
[facility]  [address] [address] [phone]  FACILITY: [facility]   Code 32  Follow up visit       NAME: Farida Whitaker  AGE: 89 y.o. SEX: female CODE STATUS: No CPR    DATE OF ENCOUNTER: 5/6/25    Assessment and Plan     1. Late onset Alzheimer's disease without behavioral disturbance (HCC)  Assessment & Plan:  Continues to slowly decline  Continue folic acid, vit D, vitamin B12 and sertraline   Continues to require help with ADLs  Redirection, reorientation and distraction as appropriate   Fall/safety precautions  Supportive care, assistance with ADLs   Avoid deliriogenic medications   Encourage adequate hydration and nutrition   Maintain sleep/wake cycle  2. Essential hypertension  Assessment & Plan:  BP stable, SBP generally 100s-140s, at times higher  No acute cardiac complaints   Avoid hypotension   Continue Lisinopril   Continue monitoring BP      3. Gastroesophageal reflux disease without esophagitis  Assessment & Plan:  No reported concerns  Not presently on medication  Avoid citrus, spicy, caffeine, and chocolate  Avoid eating/drinking 1 hour prior to laying down  OOB with meals  Continue monitoring   4. Hypothyroidism due to Hashimoto's thyroiditis  Assessment & Plan:  11/12 TSH 4.33<4.34<10.87  Continue levothyroxine 125mcg   Check TSH, fT4 tomorrow 5/8  Monitor for acute changes     5. Type 2 diabetes mellitus without complication, with long-term current use of insulin ([facility])  Assessment & Plan:    Lab Results   Component Value Date    HGBA1C 8.2 (H) 02/21/2024 11/12 A1c 5.9  Well controlled for age  Avoid hypoglycemia  Continue Lantus 9 units qHS  Continue monitoring blood sugars  Continue with diabetic diet   A1c ordered for 5/8  6. Ambulatory dysfunction  Assessment & Plan:  Multifactorial- acute and chronic conditions  Assist with ADLs  Encourage PO nutrition and hydration.  Encourage appropriate DME use   Maintain fall and safety precautions.         All medications and  routine orders were reviewed and updated as needed.    Chief Complaint     LT follow up visit    Past Medical and Surgica History      Past Medical History:   Diagnosis Date    Alzheimer's dementia (HCC)     COVID-19 2020    Depression     Ground glass opacity present on imaging of lung 2020    HLD (hyperlipidemia)     Hypothyroid     Low O2 saturation 2020    Neurocognitive disorder     T2DM (type 2 diabetes mellitus) (HCC)      Past Surgical History:   Procedure Laterality Date     SECTION      HYSTERECTOMY       Allergies   Allergen Reactions    Other      All dairy products     Tilactase         History of Present Illness     Farida Whitaker is a 89-year-old female patient residing at [facility]. Patient being seen and examined today for follow up on acute and chronic medical conditions. Upon exam patient is resting in bed. Patient appears to have poor comprehension. ROS of systems difficult due to cognition. Per nursing patients mood is stable, patient at baseline. Her appetite is improving completing % of meals, weights also improving.  Patients last BM today per records. Patient is in no acute distress.   Nursing note and labs reviewed. BW orders placed for     The patient's allergies, past medical, surgical, social and family history were reviewed and unchanged.    Review of Systems     Review of Systems   Reason unable to perform ROS: limited due to cognition.   Constitutional:  Negative for chills and fever.   Respiratory:  Negative for shortness of breath.    Cardiovascular:  Negative for chest pain.   Genitourinary:  Negative for difficulty urinating and dysuria.   All other systems reviewed and are negative.      Objective     Vitals:   Vitals:    25 1520   BP: 141/70   Pulse: 84   Resp: 18   Temp: 97.8 °F (36.6 °C)   SpO2: 93%       Physical Exam  Vitals and nursing note reviewed.   Constitutional:       General: She is not in acute distress.     Appearance:  Normal appearance. She is not ill-appearing.   HENT:      Head: Normocephalic and atraumatic.      Right Ear: External ear normal.      Left Ear: External ear normal.      Nose: Nose normal. No congestion or rhinorrhea.      Mouth/Throat:      Mouth: Mucous membranes are dry.   Eyes:      General: No scleral icterus.        Right eye: No discharge.         Left eye: No discharge.      Conjunctiva/sclera: Conjunctivae normal.   Cardiovascular:      Rate and Rhythm: Normal rate and regular rhythm.   Pulmonary:      Effort: No respiratory distress.      Breath sounds: No wheezing, rhonchi or rales.   Abdominal:      General: Bowel sounds are normal. There is no distension.      Tenderness: There is no abdominal tenderness. There is no guarding or rebound.   Musculoskeletal:      Right lower leg: Edema present.      Left lower leg: Edema present.      Comments: Trace BLE edema    Skin:     General: Skin is warm and dry.   Neurological:      Mental Status: She is alert. Mental status is at baseline.      Motor: Weakness present.      Gait: Gait abnormal.   Psychiatric:         Mood and Affect: Mood normal.         Pertinent Laboratory/Diagnostic Studies:   Reviewed in facility chart-stable    Current Medications   Medications reviewed and updated see facility MAR for details.      Current Outpatient Medications:     acetaminophen (TYLENOL) 325 mg tablet, Take 650 mg by mouth every 6 (six) hours as needed for mild pain, Disp: , Rfl:     cholecalciferol (VITAMIN D3) 1,000 units tablet, Take 1,000 Units by mouth daily, Disp: , Rfl:     folic acid (FOLVITE) 1 mg tablet, Take 1 mg by mouth daily, Disp: , Rfl:     insulin glargine (LANTUS) 100 units/mL subcutaneous injection, Inject 9 Units under the skin daily at bedtime, Disp: , Rfl:     lactase (LACTAID) 3,000 units tablet, Take 9,000 Units by mouth 3 (three) times a day with meals, Disp: , Rfl:     levothyroxine 125 mcg tablet, Take 125 mcg by mouth daily, Disp: , Rfl:      "lisinopril (ZESTRIL) 2.5 mg tablet, Take 2.5 mg by mouth daily, Disp: , Rfl:     polyethylene glycol (MIRALAX) 17 g packet, Take 17 g by mouth daily, Disp: 14 each, Rfl: 0    senna (SENOKOT) 8.6 mg, Take 1 tablet (8.6 mg total) by mouth daily at bedtime, Disp: 120 each, Rfl: 0    sertraline (ZOLOFT) 50 mg tablet, Take 50 mg by mouth daily, Disp: , Rfl:     vitamin B-12 (VITAMIN B-12) 1,000 mcg tablet, Take by mouth daily, Disp: , Rfl:        Please note:  Voice-recognition software may have been used in the preparation of this document.  Occasional wrong word or \"sound-alike\" substitutions may have occurred due to the inherent limitations of voice recognition software.  Interpretation should be guided by context.         AWA Haywood    "

## 2025-05-07 NOTE — ASSESSMENT & PLAN NOTE
Lab Results   Component Value Date    HGBA1C 8.2 (H) 02/21/2024 11/12 A1c 5.9  Well controlled for age  Avoid hypoglycemia  Continue Lantus 9 units qHS  Continue monitoring blood sugars  Continue with diabetic diet   A1c ordered for 5/8

## 2025-05-07 NOTE — ASSESSMENT & PLAN NOTE
BP stable, SBP generally 100s-140s, at times higher  No acute cardiac complaints   Avoid hypotension   Continue Lisinopril   Continue monitoring BP

## 2025-06-10 ENCOUNTER — NURSING HOME VISIT (OUTPATIENT)
Dept: GERIATRICS | Facility: OTHER | Age: OVER 89
End: 2025-06-10
Payer: MEDICARE

## 2025-06-10 DIAGNOSIS — G30.1 LATE ONSET ALZHEIMER'S DISEASE WITHOUT BEHAVIORAL DISTURBANCE (HCC): Primary | ICD-10-CM

## 2025-06-10 DIAGNOSIS — F02.80 LATE ONSET ALZHEIMER'S DISEASE WITHOUT BEHAVIORAL DISTURBANCE (HCC): Primary | ICD-10-CM

## 2025-06-10 DIAGNOSIS — R26.2 AMBULATORY DYSFUNCTION: ICD-10-CM

## 2025-06-10 DIAGNOSIS — K59.00 CONSTIPATION, UNSPECIFIED CONSTIPATION TYPE: ICD-10-CM

## 2025-06-10 DIAGNOSIS — E06.3 HYPOTHYROIDISM DUE TO HASHIMOTO'S THYROIDITIS: ICD-10-CM

## 2025-06-10 DIAGNOSIS — I10 ESSENTIAL HYPERTENSION: ICD-10-CM

## 2025-06-10 PROCEDURE — 99309 SBSQ NF CARE MODERATE MDM 30: CPT | Performed by: INTERNAL MEDICINE

## 2025-06-10 RX ORDER — ACETAMINOPHEN 325 MG/1
975 TABLET ORAL EVERY 8 HOURS
COMMUNITY

## 2025-06-10 NOTE — ASSESSMENT & PLAN NOTE
Seems stable  Continue with miralax and senna   Continue with bowel protocol  Continue monitoring bowels

## 2025-06-10 NOTE — ASSESSMENT & PLAN NOTE
Continues to slowly decline  Continue folic acid, vit D, vitamin b12 and zoloft  Continues to require help with ADLs  Continue with safety measures  Continue with fall precautions  Continue to encourage fluids and oral intake

## 2025-06-10 NOTE — PROGRESS NOTES
[facility] Nursing home notes  LONG TERM CARE       NAME: Farida Whitaker  AGE: 89 y.o. SEX: female    DATE OF ENCOUNTER: 6/10/2025    Assessment and Plan   Late onset Alzheimer's disease without behavioral disturbance (HCC)  Continues to slowly decline  Continue folic acid, vit D, vitamin b12 and zoloft  Continues to require help with ADLs  Continue with safety measures  Continue with fall precautions  Continue to encourage fluids and oral intake       Essential hypertension  BP reviewed from facility records, intermittently elevated but not constant, will not make any changes at present time  Continue lisinopril   Continue monitoring BP        Constipation  Seems stable  Continue with miralax and senna   Continue with bowel protocol  Continue monitoring bowels     Ambulatory dysfunction  Multifactorial  Continue with safety measures  Continue with fall precautions   Encourage safety measures     Hypothyroidism due to Hashimoto's thyroiditis  Continue with levothyroxine  Continue monitoring TSH           Chief Complaint     No new complaints     History of Present Illness     89 yo female seen for monthly visit and med renewal. Patient seen for dementia, HTN, constipation, ambulatory dysfunction and hypothyroidism. Reviewed nursing notes since last visit.     PMHx     Past Medical History[1]  Past Surgical History[2]  Family History[3]  Social History[4]  Allergies[5]    Review of Systems     Denies any pain or shortness of breath  All other review of system negative      Objective   Vital signs reviewed from facility records.     PHYSICAL EXAM:  GENERAL: no acute distress  SKIN: warm, dry, no cyanosis  HEENT: normocephalic, atraumatic, no JVD, no Thyromegaly, no lymphadenopathy  LUNGS: CTA, no wheezing, no rales, expanded equally, no chest tenderness   HEART: normal rhythm, normal rate, no murmur, no gallop  ABDOMEN: soft non tender non distended bs+, no guarding or rebound tenderness  :  no  suprapubic tenderness  MUSCULOSKELETAL: strength about 4+/5 all extremities, ROM within normal, no calf tenderness  NEUROLOGY: awake, alert, CN2-12 intact.   PSYCH: cooperative, pleasant.       Pertinent Laboratory/Diagnostic Studies:  Recent labs and diagnostic tests reviewed in nursing home EMR    Current Medications   Medications reviewed from nursing home EMR.  Prescriptions drug management - Patient prescriptions sent to pharmacy via renewal in [facility]         ESA SANCHEZ MD         [1]   Past Medical History:  Diagnosis Date    Alzheimer's dementia (HCC)     COVID-19 2020    Depression     Ground glass opacity present on imaging of lung 2020    HLD (hyperlipidemia)     Hypothyroid     Low O2 saturation 2020    Neurocognitive disorder     T2DM (type 2 diabetes mellitus) ([facility])    [2]   Past Surgical History:  Procedure Laterality Date     SECTION      HYSTERECTOMY     [3]   Family History  Problem Relation Name Age of Onset    Stomach cancer Mother      No Known Problems Father     [4]   Social History  Socioeconomic History    Marital status:    Tobacco Use    Smoking status: Never    Smokeless tobacco: Never   Vaping Use    Vaping status: Never Used   Substance and Sexual Activity    Alcohol use: Not Currently    Drug use: Not Currently    Sexual activity: Not Currently   [5]   Allergies  Allergen Reactions    Other      All dairy products     Tilactase

## 2025-06-10 NOTE — ASSESSMENT & PLAN NOTE
BP reviewed from facility records, intermittently elevated but not constant, will not make any changes at present time  Continue lisinopril   Continue monitoring BP

## 2025-06-10 NOTE — ASSESSMENT & PLAN NOTE
Multifactorial  Continue with safety measures  Continue with fall precautions   Encourage safety measures

## 2025-06-18 ENCOUNTER — NURSING HOME VISIT (OUTPATIENT)
Dept: GERIATRICS | Facility: OTHER | Age: OVER 89
End: 2025-06-18
Payer: MEDICARE

## 2025-06-18 VITALS
RESPIRATION RATE: 18 BRPM | SYSTOLIC BLOOD PRESSURE: 148 MMHG | WEIGHT: 129 LBS | OXYGEN SATURATION: 97 % | HEART RATE: 66 BPM | TEMPERATURE: 97.8 F | DIASTOLIC BLOOD PRESSURE: 60 MMHG | BODY MASS INDEX: 24.37 KG/M2

## 2025-06-18 DIAGNOSIS — R26.2 AMBULATORY DYSFUNCTION: ICD-10-CM

## 2025-06-18 DIAGNOSIS — Z79.4 TYPE 2 DIABETES MELLITUS WITHOUT COMPLICATION, WITH LONG-TERM CURRENT USE OF INSULIN (HCC): ICD-10-CM

## 2025-06-18 DIAGNOSIS — E11.9 TYPE 2 DIABETES MELLITUS WITHOUT COMPLICATION, WITH LONG-TERM CURRENT USE OF INSULIN (HCC): ICD-10-CM

## 2025-06-18 DIAGNOSIS — F02.80 LATE ONSET ALZHEIMER'S DISEASE WITHOUT BEHAVIORAL DISTURBANCE (HCC): ICD-10-CM

## 2025-06-18 DIAGNOSIS — R05.1 ACUTE COUGH: Primary | ICD-10-CM

## 2025-06-18 DIAGNOSIS — G30.1 LATE ONSET ALZHEIMER'S DISEASE WITHOUT BEHAVIORAL DISTURBANCE (HCC): ICD-10-CM

## 2025-06-18 PROCEDURE — 99309 SBSQ NF CARE MODERATE MDM 30: CPT

## 2025-06-18 NOTE — ASSESSMENT & PLAN NOTE
Per nursing patient started with a cough last evening.   Vitals stable, afebrile  Lungs clear  Will order musa-tussin 10mL q6hr PRN x7 days  Vitals x5 days  Encourage fluids  Continue to monitor for acute changes

## 2025-06-18 NOTE — PROGRESS NOTES
[facility]  [address] [address]  [phone]  FACILITY: [facility]   Code 32  Acute visit       NAME: Farida Whitaker  AGE: 89 y.o. SEX: female CODE STATUS: No CPR    DATE OF ENCOUNTER: 6/18/2025    Assessment and Plan     1. Acute cough  Assessment & Plan:  Per nursing patient started with a cough last evening.   Vitals stable, afebrile  Lungs clear  Will order musa-tussin 10mL q6hr PRN x7 days  Vitals x5 days  Encourage fluids  Continue to monitor for acute changes   2. Ambulatory dysfunction  Assessment & Plan:  Multifactorial- acute and chronic conditions  Assist with ADLs  Encourage PO nutrition and hydration.  Encourage appropriate DME use   Maintain fall and safety precautions.    3. Late onset Alzheimer's disease without behavioral disturbance (HCC)  Assessment & Plan:  Continues to slowly decline  Continue folic acid, vit D, vitamin B12 and sertraline   Continues to require help with ADLs  Redirection, reorientation and distraction as appropriate   Fall/safety precautions  Supportive care, assistance with ADLs   Avoid deliriogenic medications   Encourage adequate hydration and nutrition   Maintain sleep/wake cycle  4. Type 2 diabetes mellitus without complication, with long-term current use of insulin ([facility])  Assessment & Plan:    Lab Results   Component Value Date    HGBA1C 8.2 (H) 02/21/2024 5/8 A1c 5.8  Well controlled for age  Avoid hypoglycemia  Continue Lantus 9 units qHS  Continue monitoring blood sugars  Continue with diabetic diet        All medications and routine orders were reviewed and updated as needed.    Chief Complaint     LTC acute visit     Past Medical and Surgica History      Past Medical History[1]  Past Surgical History[2]  Allergies   Allergen Reactions    Other      All dairy products     Tilactase         History of Present Illness     Farida Whitaker is a 89-year-old female patient residing at [facility]. Patient being seen and examined today for acute  cough.  Patient reports to cough that started last night. Of note patient appears to have poor comprehension, and [place]. ROS of systems limited due to cognition. Per nursing no further concerns. Patient is in no acute distress.   Nursing note and labs reviewed.    Patient's allergies, past medical, surgical, social and family history were reviewed and unchanged.    Review of Systems     Review of Systems   Reason unable to perform ROS: limited due to cognition.   Constitutional:  Negative for chills and fever.   Respiratory:  Positive for cough. Negative for shortness of breath.    Cardiovascular:  Negative for chest pain.   Genitourinary:  Negative for difficulty urinating and dysuria.   All other systems reviewed and are negative.      Objective     Vitals:   Vitals:    06/18/25 1450   BP: 148/60   Pulse: 66   Resp: 18   Temp: 97.8 °F (36.6 °C)   SpO2: 97%     Physical Exam  Vitals and nursing note reviewed.   Constitutional:       General: She is not in acute distress.     Appearance: Normal appearance. She is not ill-appearing.   HENT:      Head: Normocephalic and atraumatic.      Right Ear: External ear normal.      Left Ear: External ear normal.      Nose: Nose normal. No congestion or rhinorrhea.      Mouth/Throat:      Mouth: Mucous membranes are dry.     Eyes:      General: No scleral icterus.        Right eye: No discharge.         Left eye: No discharge.      Conjunctiva/sclera: Conjunctivae normal.       Cardiovascular:      Rate and Rhythm: Normal rate and regular rhythm.   Pulmonary:      Effort: No respiratory distress.      Breath sounds: No wheezing, rhonchi or rales.   Abdominal:      General: Bowel sounds are normal. There is no distension.      Tenderness: There is no abdominal tenderness. There is no guarding or rebound.     Musculoskeletal:      Right lower leg: Edema present.      Left lower leg: Edema present.      Comments: Trace BLE edema      Skin:     General: Skin is warm and dry.  "    Neurological:      Mental Status: She is alert. Mental status is at baseline.      Motor: Weakness present.      Gait: Gait abnormal.     Psychiatric:         Mood and Affect: Mood normal.         Pertinent Laboratory/Diagnostic Studies:   Reviewed in facility chart-stable    Current Medications   Medications reviewed and updated see facility MAR for details.    Current Medications[3]       Please note:  Voice-recognition software may have been used in the preparation of this document.  Occasional wrong word or \"sound-alike\" substitutions may have occurred due to the inherent limitations of voice recognition software.  Interpretation should be guided by context.         AWA [place]         [1]   Past Medical History:  Diagnosis Date    Alzheimer's dementia (HCC)     COVID-19 2020    Depression     Ground glass opacity present on imaging of lung 2020    HLD (hyperlipidemia)     Hypothyroid     Low O2 saturation 2020    Neurocognitive disorder     T2DM (type 2 diabetes mellitus) ([facility])    [2]   Past Surgical History:  Procedure Laterality Date     SECTION      HYSTERECTOMY     [3]   Current Outpatient Medications:     acetaminophen (TYLENOL) 325 mg tablet, Take 650 mg by mouth every 6 (six) hours as needed for mild pain, Disp: , Rfl:     acetaminophen (TYLENOL) 325 mg tablet, Take 975 mg by mouth every 8 (eight) hours, Disp: , Rfl:     cholecalciferol (VITAMIN D3) 1,000 units tablet, Take 1,000 Units by mouth in the morning., Disp: , Rfl:     folic acid (FOLVITE) 1 mg tablet, Take 1 mg by mouth in the morning., Disp: , Rfl:     insulin glargine (LANTUS) 100 units/mL subcutaneous injection, Inject 9 Units under the skin daily at bedtime, Disp: , Rfl:     lactase (LACTAID) 3,000 units tablet, Take 9,000 Units by mouth in the morning and 9,000 Units at noon and 9,000 Units in the evening. Take with meals., Disp: , Rfl:     levothyroxine 125 mcg tablet, Take 125 mcg by mouth in the " morning., Disp: , Rfl:     lisinopril (ZESTRIL) 2.5 mg tablet, Take 2.5 mg by mouth in the morning., Disp: , Rfl:     polyethylene glycol (MIRALAX) 17 g packet, Take 17 g by mouth daily, Disp: 14 each, Rfl: 0    senna (SENOKOT) 8.6 mg, Take 1 tablet (8.6 mg total) by mouth daily at bedtime, Disp: 120 each, Rfl: 0    sertraline (ZOLOFT) 50 mg tablet, Take 50 mg by mouth in the morning., Disp: , Rfl:     vitamin B-12 (VITAMIN B-12) 1,000 mcg tablet, Take by mouth in the morning., Disp: , Rfl:

## 2025-06-18 NOTE — ASSESSMENT & PLAN NOTE
Lab Results   Component Value Date    HGBA1C 8.2 (H) 02/21/2024 5/8 A1c 5.8  Well controlled for age  Avoid hypoglycemia  Continue Lantus 9 units qHS  Continue monitoring blood sugars  Continue with diabetic diet

## 2025-07-16 ENCOUNTER — NURSING HOME VISIT (OUTPATIENT)
Dept: GERIATRICS | Facility: OTHER | Age: OVER 89
End: 2025-07-16
Payer: MEDICARE

## 2025-07-16 DIAGNOSIS — K21.9 GASTROESOPHAGEAL REFLUX DISEASE WITHOUT ESOPHAGITIS: ICD-10-CM

## 2025-07-16 DIAGNOSIS — E11.9 TYPE 2 DIABETES MELLITUS WITHOUT COMPLICATION, WITH LONG-TERM CURRENT USE OF INSULIN (HCC): ICD-10-CM

## 2025-07-16 DIAGNOSIS — I10 ESSENTIAL HYPERTENSION: ICD-10-CM

## 2025-07-16 DIAGNOSIS — R26.2 AMBULATORY DYSFUNCTION: ICD-10-CM

## 2025-07-16 DIAGNOSIS — Z79.4 TYPE 2 DIABETES MELLITUS WITHOUT COMPLICATION, WITH LONG-TERM CURRENT USE OF INSULIN (HCC): ICD-10-CM

## 2025-07-16 DIAGNOSIS — G30.1 LATE ONSET ALZHEIMER'S DISEASE WITHOUT BEHAVIORAL DISTURBANCE (HCC): Primary | ICD-10-CM

## 2025-07-16 DIAGNOSIS — F02.80 LATE ONSET ALZHEIMER'S DISEASE WITHOUT BEHAVIORAL DISTURBANCE (HCC): Primary | ICD-10-CM

## 2025-07-16 PROCEDURE — 99309 SBSQ NF CARE MODERATE MDM 30: CPT

## 2025-07-17 VITALS
TEMPERATURE: 97.7 F | WEIGHT: 125.5 LBS | HEART RATE: 61 BPM | DIASTOLIC BLOOD PRESSURE: 74 MMHG | BODY MASS INDEX: 23.71 KG/M2 | SYSTOLIC BLOOD PRESSURE: 156 MMHG | RESPIRATION RATE: 18 BRPM | OXYGEN SATURATION: 99 %

## 2025-07-17 NOTE — ASSESSMENT & PLAN NOTE
BP stable, SBP generally 110s-150s  No acute cardiac complaints   Avoid hypotension   Will increase Lisinopril to 5mg daily with hold parameters    Continue monitoring BP

## 2025-07-18 PROBLEM — R05.1 ACUTE COUGH: Status: RESOLVED | Noted: 2022-05-17 | Resolved: 2025-07-18

## 2025-08-07 ENCOUNTER — NURSING HOME VISIT (OUTPATIENT)
Dept: GERIATRICS | Facility: OTHER | Age: OVER 89
End: 2025-08-07
Payer: MEDICARE

## 2025-08-07 DIAGNOSIS — E11.9 TYPE 2 DIABETES MELLITUS WITHOUT COMPLICATION, WITH LONG-TERM CURRENT USE OF INSULIN (HCC): ICD-10-CM

## 2025-08-07 DIAGNOSIS — K59.00 CONSTIPATION, UNSPECIFIED CONSTIPATION TYPE: ICD-10-CM

## 2025-08-07 DIAGNOSIS — Z79.4 TYPE 2 DIABETES MELLITUS WITHOUT COMPLICATION, WITH LONG-TERM CURRENT USE OF INSULIN (HCC): ICD-10-CM

## 2025-08-07 DIAGNOSIS — G30.1 LATE ONSET ALZHEIMER'S DISEASE WITHOUT BEHAVIORAL DISTURBANCE (HCC): Primary | ICD-10-CM

## 2025-08-07 DIAGNOSIS — I10 ESSENTIAL HYPERTENSION: ICD-10-CM

## 2025-08-07 DIAGNOSIS — E06.3 HYPOTHYROIDISM DUE TO HASHIMOTO'S THYROIDITIS: ICD-10-CM

## 2025-08-07 DIAGNOSIS — F02.80 LATE ONSET ALZHEIMER'S DISEASE WITHOUT BEHAVIORAL DISTURBANCE (HCC): Primary | ICD-10-CM

## 2025-08-07 PROCEDURE — 99309 SBSQ NF CARE MODERATE MDM 30: CPT | Performed by: INTERNAL MEDICINE

## 2025-08-07 RX ORDER — LISINOPRIL 10 MG/1
10 TABLET ORAL DAILY
COMMUNITY